# Patient Record
Sex: FEMALE | Race: BLACK OR AFRICAN AMERICAN | Employment: FULL TIME | URBAN - METROPOLITAN AREA
[De-identification: names, ages, dates, MRNs, and addresses within clinical notes are randomized per-mention and may not be internally consistent; named-entity substitution may affect disease eponyms.]

---

## 2017-01-06 ENCOUNTER — HOSPITAL ENCOUNTER (EMERGENCY)
Age: 22
Discharge: HOME | End: 2017-01-06
Payer: COMMERCIAL

## 2017-01-06 VITALS
HEART RATE: 55 BPM | SYSTOLIC BLOOD PRESSURE: 138 MMHG | RESPIRATION RATE: 16 BRPM | BODY MASS INDEX: 31.07 KG/M2 | OXYGEN SATURATION: 98 % | DIASTOLIC BLOOD PRESSURE: 77 MMHG | WEIGHT: 205 LBS | HEIGHT: 68 IN | TEMPERATURE: 98.7 F

## 2017-01-06 DIAGNOSIS — O21.0 HYPEREMESIS GRAVIDARUM: Primary | ICD-10-CM

## 2017-01-06 LAB
ALBUMIN SERPL-MCNC: 4.8 G/DL (ref 3.9–4.9)
ALP BLD-CCNC: 86 U/L (ref 40–130)
ALT SERPL-CCNC: 9 U/L (ref 0–33)
AMORPHOUS: ABNORMAL
ANION GAP SERPL CALCULATED.3IONS-SCNC: 17 MEQ/L (ref 7–13)
AST SERPL-CCNC: 8 U/L (ref 0–35)
BACTERIA: ABNORMAL /HPF
BASOPHILS ABSOLUTE: 0.1 K/UL (ref 0–0.2)
BASOPHILS RELATIVE PERCENT: 0.5 %
BETA-HYDROXYBUTYRATE: 11.3 MG/DL (ref 0.2–2.8)
BILIRUB SERPL-MCNC: 0.7 MG/DL (ref 0–1.2)
BILIRUBIN URINE: NEGATIVE
BLOOD, URINE: NEGATIVE
BUN BLDV-MCNC: 9 MG/DL (ref 6–20)
CALCIUM SERPL-MCNC: 9.9 MG/DL (ref 8.6–10.2)
CHLORIDE BLD-SCNC: 97 MEQ/L (ref 98–107)
CHP ED QC CHECK: YES
CLARITY: CLEAR
CO2: 19 MEQ/L (ref 22–29)
COLOR: ABNORMAL
CREAT SERPL-MCNC: 0.43 MG/DL (ref 0.5–0.9)
EOSINOPHILS ABSOLUTE: 0 K/UL (ref 0–0.7)
EOSINOPHILS RELATIVE PERCENT: 0.2 %
GFR AFRICAN AMERICAN: >60
GFR NON-AFRICAN AMERICAN: >60
GLOBULIN: 2.7 G/DL (ref 2.3–3.5)
GLUCOSE BLD-MCNC: 62 MG/DL
GLUCOSE BLD-MCNC: 62 MG/DL (ref 60–115)
GLUCOSE BLD-MCNC: 80 MG/DL (ref 74–109)
GLUCOSE URINE: NEGATIVE MG/DL
HCG(URINE) PREGNANCY TEST: POSITIVE
HCT VFR BLD CALC: 38.2 % (ref 37–47)
HEMOGLOBIN: 12.2 G/DL (ref 12–16)
KETONES, URINE: >=80 MG/DL
LEUKOCYTE ESTERASE, URINE: ABNORMAL
LYMPHOCYTES ABSOLUTE: 1.5 K/UL (ref 1–4.8)
LYMPHOCYTES RELATIVE PERCENT: 10 %
MCH RBC QN AUTO: 27.9 PG (ref 27–31.3)
MCHC RBC AUTO-ENTMCNC: 32 % (ref 33–37)
MCV RBC AUTO: 87.3 FL (ref 82–100)
MONOCYTES ABSOLUTE: 1 K/UL (ref 0.2–0.8)
MONOCYTES RELATIVE PERCENT: 6.3 %
MUCUS: PRESENT
NEUTROPHILS ABSOLUTE: 12.8 K/UL (ref 1.4–6.5)
NEUTROPHILS RELATIVE PERCENT: 83 %
NITRITE, URINE: NEGATIVE
PDW BLD-RTO: 12.7 % (ref 11.5–14.5)
PERFORMED ON: NORMAL
PH UA: 6 (ref 5–9)
PLATELET # BLD: 367 K/UL (ref 130–400)
POTASSIUM SERPL-SCNC: 4.1 MEQ/L (ref 3.5–5.1)
PROTEIN UA: 30 MG/DL
RBC # BLD: 4.38 M/UL (ref 4.2–5.4)
RBC UA: ABNORMAL /HPF (ref 0–2)
SODIUM BLD-SCNC: 133 MEQ/L (ref 132–144)
SPECIFIC GRAVITY UA: 1.03 (ref 1–1.03)
TOTAL PROTEIN: 7.5 G/DL (ref 6.4–8.1)
UROBILINOGEN, URINE: 1 E.U./DL
WBC # BLD: 15.4 K/UL (ref 4.5–11)
WBC UA: ABNORMAL /HPF (ref 0–5)

## 2017-01-06 PROCEDURE — 81001 URINALYSIS AUTO W/SCOPE: CPT

## 2017-01-06 PROCEDURE — 82010 KETONE BODYS QUAN: CPT

## 2017-01-06 PROCEDURE — 6360000002 HC RX W HCPCS: Performed by: PERSONAL EMERGENCY RESPONSE ATTENDANT

## 2017-01-06 PROCEDURE — 96374 THER/PROPH/DIAG INJ IV PUSH: CPT

## 2017-01-06 PROCEDURE — 36415 COLL VENOUS BLD VENIPUNCTURE: CPT

## 2017-01-06 PROCEDURE — 2580000003 HC RX 258: Performed by: PERSONAL EMERGENCY RESPONSE ATTENDANT

## 2017-01-06 PROCEDURE — 85025 COMPLETE CBC W/AUTO DIFF WBC: CPT

## 2017-01-06 PROCEDURE — 99283 EMERGENCY DEPT VISIT LOW MDM: CPT

## 2017-01-06 PROCEDURE — 84703 CHORIONIC GONADOTROPIN ASSAY: CPT

## 2017-01-06 PROCEDURE — 80053 COMPREHEN METABOLIC PANEL: CPT

## 2017-01-06 RX ORDER — 0.9 % SODIUM CHLORIDE 0.9 %
1000 INTRAVENOUS SOLUTION INTRAVENOUS ONCE
Status: COMPLETED | OUTPATIENT
Start: 2017-01-06 | End: 2017-01-06

## 2017-01-06 RX ORDER — RANITIDINE 150 MG/1
150 TABLET ORAL 2 TIMES DAILY
Status: ON HOLD | COMMUNITY
End: 2017-07-12

## 2017-01-06 RX ORDER — METOCLOPRAMIDE HYDROCHLORIDE 5 MG/ML
10 INJECTION INTRAMUSCULAR; INTRAVENOUS ONCE
Status: COMPLETED | OUTPATIENT
Start: 2017-01-06 | End: 2017-01-06

## 2017-01-06 RX ORDER — METOCLOPRAMIDE 10 MG/1
10 TABLET ORAL 4 TIMES DAILY
Status: ON HOLD | COMMUNITY
End: 2017-07-12

## 2017-01-06 RX ORDER — FERROUS SULFATE 325(65) MG
325 TABLET ORAL
Status: ON HOLD | COMMUNITY
End: 2017-07-12

## 2017-01-06 RX ORDER — DIPHENHYDRAMINE HYDROCHLORIDE 25 MG/1
25 CAPSULE ORAL 3 TIMES DAILY PRN
Status: ON HOLD | COMMUNITY
End: 2017-07-12

## 2017-01-06 RX ADMIN — METOCLOPRAMIDE 10 MG: 5 INJECTION, SOLUTION INTRAMUSCULAR; INTRAVENOUS at 20:22

## 2017-01-06 RX ADMIN — SODIUM CHLORIDE 1000 ML: 9 INJECTION, SOLUTION INTRAVENOUS at 20:22

## 2017-01-06 ASSESSMENT — ENCOUNTER SYMPTOMS
SHORTNESS OF BREATH: 0
COLOR CHANGE: 0
BLOOD IN STOOL: 0
ABDOMINAL PAIN: 1
SORE THROAT: 0
NAUSEA: 0
DIARRHEA: 0
COUGH: 0
TROUBLE SWALLOWING: 0
ANAL BLEEDING: 0
FACIAL SWELLING: 0
VOICE CHANGE: 0
VOMITING: 1

## 2017-01-06 ASSESSMENT — PAIN DESCRIPTION - LOCATION: LOCATION: ABDOMEN

## 2017-01-06 ASSESSMENT — PAIN SCALES - GENERAL: PAINLEVEL_OUTOF10: 6

## 2017-01-20 ENCOUNTER — HOSPITAL ENCOUNTER (OUTPATIENT)
Age: 22
Setting detail: OBSERVATION
Discharge: HOME | End: 2017-01-22
Attending: OBSTETRICS & GYNECOLOGY | Admitting: OBSTETRICS & GYNECOLOGY
Payer: COMMERCIAL

## 2017-01-20 ENCOUNTER — APPOINTMENT (OUTPATIENT)
Dept: ULTRASOUND IMAGING | Age: 22
End: 2017-01-20
Payer: COMMERCIAL

## 2017-01-20 ENCOUNTER — HOSPITAL ENCOUNTER (EMERGENCY)
Age: 22
Discharge: LEFT BEFORE DISCHARGE | End: 2017-01-20
Attending: EMERGENCY MEDICINE
Payer: COMMERCIAL

## 2017-01-20 VITALS
HEART RATE: 86 BPM | OXYGEN SATURATION: 99 % | TEMPERATURE: 97.9 F | HEIGHT: 68 IN | WEIGHT: 205 LBS | SYSTOLIC BLOOD PRESSURE: 136 MMHG | BODY MASS INDEX: 31.07 KG/M2 | DIASTOLIC BLOOD PRESSURE: 72 MMHG | RESPIRATION RATE: 17 BRPM

## 2017-01-20 DIAGNOSIS — R11.0 NAUSEA: Primary | ICD-10-CM

## 2017-01-20 DIAGNOSIS — O21.0 MILD HYPEREMESIS GRAVIDARUM, ANTEPARTUM: ICD-10-CM

## 2017-01-20 LAB
ABO/RH: NORMAL
ALBUMIN SERPL-MCNC: 4.3 G/DL (ref 3.9–4.9)
ALBUMIN SERPL-MCNC: 4.5 G/DL (ref 3.9–4.9)
ALP BLD-CCNC: 77 U/L (ref 40–130)
ALP BLD-CCNC: 87 U/L (ref 40–130)
ALT SERPL-CCNC: 49 U/L (ref 0–33)
ALT SERPL-CCNC: 53 U/L (ref 0–33)
ANION GAP SERPL CALCULATED.3IONS-SCNC: 13 MEQ/L (ref 7–13)
ANION GAP SERPL CALCULATED.3IONS-SCNC: 15 MEQ/L (ref 7–13)
ANTIBODY SCREEN: NORMAL
AST SERPL-CCNC: 15 U/L (ref 0–35)
AST SERPL-CCNC: 17 U/L (ref 0–35)
BASOPHILS ABSOLUTE: 0 K/UL (ref 0–0.2)
BASOPHILS ABSOLUTE: 0.1 K/UL (ref 0–0.2)
BASOPHILS RELATIVE PERCENT: 0.1 %
BASOPHILS RELATIVE PERCENT: 0.4 %
BETA-HYDROXYBUTYRATE: 2.4 MG/DL (ref 0.2–2.8)
BILIRUB SERPL-MCNC: 0.5 MG/DL (ref 0–1.2)
BILIRUB SERPL-MCNC: 0.6 MG/DL (ref 0–1.2)
BUN BLDV-MCNC: 6 MG/DL (ref 6–20)
BUN BLDV-MCNC: 7 MG/DL (ref 6–20)
CALCIUM SERPL-MCNC: 9.4 MG/DL (ref 8.6–10.2)
CALCIUM SERPL-MCNC: 9.8 MG/DL (ref 8.6–10.2)
CHLORIDE BLD-SCNC: 96 MEQ/L (ref 98–107)
CHLORIDE BLD-SCNC: 99 MEQ/L (ref 98–107)
CO2: 23 MEQ/L (ref 22–29)
CO2: 23 MEQ/L (ref 22–29)
CREAT SERPL-MCNC: 0.56 MG/DL (ref 0.5–0.9)
CREAT SERPL-MCNC: 0.61 MG/DL (ref 0.5–0.9)
EOSINOPHILS ABSOLUTE: 0.1 K/UL (ref 0–0.7)
EOSINOPHILS ABSOLUTE: 0.1 K/UL (ref 0–0.7)
EOSINOPHILS RELATIVE PERCENT: 0.5 %
EOSINOPHILS RELATIVE PERCENT: 0.5 %
GFR AFRICAN AMERICAN: >60
GFR AFRICAN AMERICAN: >60
GFR NON-AFRICAN AMERICAN: >60
GFR NON-AFRICAN AMERICAN: >60
GLOBULIN: 2.2 G/DL (ref 2.3–3.5)
GLOBULIN: 2.8 G/DL (ref 2.3–3.5)
GLUCOSE BLD-MCNC: 64 MG/DL (ref 60–115)
GLUCOSE BLD-MCNC: 75 MG/DL (ref 74–109)
GLUCOSE BLD-MCNC: 78 MG/DL (ref 60–115)
GLUCOSE BLD-MCNC: 78 MG/DL (ref 74–109)
HBA1C MFR BLD: 5.3 % (ref 4.8–5.9)
HCT VFR BLD CALC: 35 % (ref 37–47)
HCT VFR BLD CALC: 37.9 % (ref 37–47)
HEMOGLOBIN: 11.6 G/DL (ref 12–16)
HEMOGLOBIN: 12.8 G/DL (ref 12–16)
HEPATITIS B SURFACE ANTIGEN INTERPRETATION: NORMAL
LYMPHOCYTES ABSOLUTE: 1 K/UL (ref 1–4.8)
LYMPHOCYTES ABSOLUTE: 1.6 K/UL (ref 1–4.8)
LYMPHOCYTES RELATIVE PERCENT: 11.4 %
LYMPHOCYTES RELATIVE PERCENT: 8.6 %
MCH RBC QN AUTO: 28.9 PG (ref 27–31.3)
MCH RBC QN AUTO: 29.7 PG (ref 27–31.3)
MCHC RBC AUTO-ENTMCNC: 33.1 % (ref 33–37)
MCHC RBC AUTO-ENTMCNC: 33.9 % (ref 33–37)
MCV RBC AUTO: 87.3 FL (ref 82–100)
MCV RBC AUTO: 87.7 FL (ref 82–100)
MONOCYTES ABSOLUTE: 0.8 K/UL (ref 0.2–0.8)
MONOCYTES ABSOLUTE: 0.9 K/UL (ref 0.2–0.8)
MONOCYTES RELATIVE PERCENT: 6.7 %
MONOCYTES RELATIVE PERCENT: 6.9 %
NEUTROPHILS ABSOLUTE: 10 K/UL (ref 1.4–6.5)
NEUTROPHILS ABSOLUTE: 11 K/UL (ref 1.4–6.5)
NEUTROPHILS RELATIVE PERCENT: 80.8 %
NEUTROPHILS RELATIVE PERCENT: 84.1 %
PDW BLD-RTO: 12.9 % (ref 11.5–14.5)
PDW BLD-RTO: 13.2 % (ref 11.5–14.5)
PERFORMED ON: NORMAL
PERFORMED ON: NORMAL
PLATELET # BLD: 280 K/UL (ref 130–400)
PLATELET # BLD: 298 K/UL (ref 130–400)
POTASSIUM SERPL-SCNC: 3.4 MEQ/L (ref 3.5–5.1)
POTASSIUM SERPL-SCNC: 4 MEQ/L (ref 3.5–5.1)
RBC # BLD: 4.01 M/UL (ref 4.2–5.4)
RBC # BLD: 4.32 M/UL (ref 4.2–5.4)
SODIUM BLD-SCNC: 132 MEQ/L (ref 132–144)
SODIUM BLD-SCNC: 137 MEQ/L (ref 132–144)
TOTAL PROTEIN: 6.5 G/DL (ref 6.4–8.1)
TOTAL PROTEIN: 7.3 G/DL (ref 6.4–8.1)
WBC # BLD: 11.9 K/UL (ref 4.5–11)
WBC # BLD: 13.7 K/UL (ref 4.5–11)

## 2017-01-20 PROCEDURE — 83036 HEMOGLOBIN GLYCOSYLATED A1C: CPT

## 2017-01-20 PROCEDURE — 82010 KETONE BODYS QUAN: CPT

## 2017-01-20 PROCEDURE — 80053 COMPREHEN METABOLIC PANEL: CPT

## 2017-01-20 PROCEDURE — 6360000002 HC RX W HCPCS: Performed by: EMERGENCY MEDICINE

## 2017-01-20 PROCEDURE — 76817 TRANSVAGINAL US OBSTETRIC: CPT

## 2017-01-20 PROCEDURE — 99283 EMERGENCY DEPT VISIT LOW MDM: CPT

## 2017-01-20 PROCEDURE — 36415 COLL VENOUS BLD VENIPUNCTURE: CPT

## 2017-01-20 PROCEDURE — 99221 1ST HOSP IP/OBS SF/LOW 40: CPT | Performed by: OBSTETRICS & GYNECOLOGY

## 2017-01-20 PROCEDURE — 86592 SYPHILIS TEST NON-TREP QUAL: CPT

## 2017-01-20 PROCEDURE — 96361 HYDRATE IV INFUSION ADD-ON: CPT

## 2017-01-20 PROCEDURE — 96374 THER/PROPH/DIAG INJ IV PUSH: CPT

## 2017-01-20 PROCEDURE — 85025 COMPLETE CBC W/AUTO DIFF WBC: CPT

## 2017-01-20 PROCEDURE — 76801 OB US < 14 WKS SINGLE FETUS: CPT

## 2017-01-20 PROCEDURE — 6370000000 HC RX 637 (ALT 250 FOR IP): Performed by: OBSTETRICS & GYNECOLOGY

## 2017-01-20 PROCEDURE — 86900 BLOOD TYPING SEROLOGIC ABO: CPT

## 2017-01-20 PROCEDURE — 2580000003 HC RX 258: Performed by: OBSTETRICS & GYNECOLOGY

## 2017-01-20 PROCEDURE — 6360000002 HC RX W HCPCS: Performed by: OBSTETRICS & GYNECOLOGY

## 2017-01-20 PROCEDURE — G0378 HOSPITAL OBSERVATION PER HR: HCPCS

## 2017-01-20 PROCEDURE — 86850 RBC ANTIBODY SCREEN: CPT

## 2017-01-20 PROCEDURE — 86901 BLOOD TYPING SEROLOGIC RH(D): CPT

## 2017-01-20 PROCEDURE — 87340 HEPATITIS B SURFACE AG IA: CPT

## 2017-01-20 PROCEDURE — 84600 ASSAY OF VOLATILES: CPT

## 2017-01-20 RX ORDER — ONDANSETRON 2 MG/ML
4 INJECTION INTRAMUSCULAR; INTRAVENOUS EVERY 6 HOURS PRN
Status: DISCONTINUED | OUTPATIENT
Start: 2017-01-20 | End: 2017-01-22 | Stop reason: HOSPADM

## 2017-01-20 RX ORDER — ZOLPIDEM TARTRATE 5 MG/1
5 TABLET ORAL NIGHTLY PRN
Status: DISCONTINUED | OUTPATIENT
Start: 2017-01-20 | End: 2017-01-22 | Stop reason: HOSPADM

## 2017-01-20 RX ORDER — ACETAMINOPHEN 500 MG
1000 TABLET ORAL EVERY 6 HOURS PRN
Status: DISCONTINUED | OUTPATIENT
Start: 2017-01-20 | End: 2017-01-22 | Stop reason: HOSPADM

## 2017-01-20 RX ORDER — DEXTROSE MONOHYDRATE 50 MG/ML
100 INJECTION, SOLUTION INTRAVENOUS PRN
Status: DISCONTINUED | OUTPATIENT
Start: 2017-01-20 | End: 2017-01-22 | Stop reason: HOSPADM

## 2017-01-20 RX ORDER — HYDROXYZINE HYDROCHLORIDE 25 MG/1
25 TABLET, FILM COATED ORAL 3 TIMES DAILY PRN
Status: ON HOLD | COMMUNITY
End: 2017-07-12

## 2017-01-20 RX ORDER — SODIUM CHLORIDE, SODIUM LACTATE, POTASSIUM CHLORIDE, AND CALCIUM CHLORIDE .6; .31; .03; .02 G/100ML; G/100ML; G/100ML; G/100ML
1000 INJECTION, SOLUTION INTRAVENOUS ONCE
Status: COMPLETED | OUTPATIENT
Start: 2017-01-20 | End: 2017-01-20

## 2017-01-20 RX ORDER — DEXTROSE MONOHYDRATE 25 G/50ML
12.5 INJECTION, SOLUTION INTRAVENOUS PRN
Status: DISCONTINUED | OUTPATIENT
Start: 2017-01-20 | End: 2017-01-22 | Stop reason: HOSPADM

## 2017-01-20 RX ORDER — FAMOTIDINE 20 MG/1
20 TABLET, FILM COATED ORAL 2 TIMES DAILY
Status: DISCONTINUED | OUTPATIENT
Start: 2017-01-20 | End: 2017-01-22 | Stop reason: HOSPADM

## 2017-01-20 RX ORDER — PROMETHAZINE HYDROCHLORIDE 25 MG/ML
6.25 INJECTION, SOLUTION INTRAMUSCULAR; INTRAVENOUS EVERY 6 HOURS PRN
Status: DISCONTINUED | OUTPATIENT
Start: 2017-01-20 | End: 2017-01-22 | Stop reason: HOSPADM

## 2017-01-20 RX ORDER — ONDANSETRON 2 MG/ML
4 INJECTION INTRAMUSCULAR; INTRAVENOUS ONCE
Status: DISCONTINUED | OUTPATIENT
Start: 2017-01-20 | End: 2017-01-20

## 2017-01-20 RX ORDER — ONDANSETRON 4 MG/1
4 TABLET, ORALLY DISINTEGRATING ORAL ONCE
Status: COMPLETED | OUTPATIENT
Start: 2017-01-20 | End: 2017-01-20

## 2017-01-20 RX ORDER — DEXTROSE AND SODIUM CHLORIDE 5; .9 G/100ML; G/100ML
INJECTION, SOLUTION INTRAVENOUS ONCE
Status: DISCONTINUED | OUTPATIENT
Start: 2017-01-20 | End: 2017-01-20 | Stop reason: HOSPADM

## 2017-01-20 RX ORDER — NICOTINE POLACRILEX 4 MG
15 LOZENGE BUCCAL PRN
Status: DISCONTINUED | OUTPATIENT
Start: 2017-01-20 | End: 2017-01-22 | Stop reason: HOSPADM

## 2017-01-20 RX ORDER — 0.9 % SODIUM CHLORIDE 0.9 %
1000 INTRAVENOUS SOLUTION INTRAVENOUS ONCE
Status: DISCONTINUED | OUTPATIENT
Start: 2017-01-20 | End: 2017-01-20 | Stop reason: HOSPADM

## 2017-01-20 RX ORDER — SODIUM CHLORIDE, SODIUM LACTATE, POTASSIUM CHLORIDE, CALCIUM CHLORIDE 600; 310; 30; 20 MG/100ML; MG/100ML; MG/100ML; MG/100ML
INJECTION, SOLUTION INTRAVENOUS CONTINUOUS
Status: DISCONTINUED | OUTPATIENT
Start: 2017-01-20 | End: 2017-01-22 | Stop reason: HOSPADM

## 2017-01-20 RX ADMIN — SODIUM CHLORIDE, POTASSIUM CHLORIDE, SODIUM LACTATE AND CALCIUM CHLORIDE: 600; 310; 30; 20 INJECTION, SOLUTION INTRAVENOUS at 20:21

## 2017-01-20 RX ADMIN — FAMOTIDINE 20 MG: 20 TABLET ORAL at 22:14

## 2017-01-20 RX ADMIN — SODIUM CHLORIDE, POTASSIUM CHLORIDE, SODIUM LACTATE AND CALCIUM CHLORIDE 1000 ML: 600; 310; 30; 20 INJECTION, SOLUTION INTRAVENOUS at 19:01

## 2017-01-20 RX ADMIN — ONDANSETRON 4 MG: 2 INJECTION INTRAMUSCULAR; INTRAVENOUS at 19:02

## 2017-01-20 RX ADMIN — ONDANSETRON 4 MG: 4 TABLET, ORALLY DISINTEGRATING ORAL at 14:29

## 2017-01-20 ASSESSMENT — ENCOUNTER SYMPTOMS
ABDOMINAL PAIN: 0
CHOKING: 0
SORE THROAT: 0
BLOOD IN STOOL: 0
EYE REDNESS: 0
CONSTIPATION: 0
EYE PAIN: 0
VOMITING: 1
COUGH: 0
BACK PAIN: 0
STRIDOR: 0
EYE DISCHARGE: 0
TROUBLE SWALLOWING: 0
SHORTNESS OF BREATH: 0
FACIAL SWELLING: 0
CHEST TIGHTNESS: 0
SINUS PRESSURE: 0
DIARRHEA: 0
WHEEZING: 0
NAUSEA: 1
VOICE CHANGE: 0

## 2017-01-21 LAB
AMPHETAMINE SCREEN, URINE: NORMAL
BARBITURATE SCREEN URINE: NORMAL
BENZODIAZEPINE SCREEN, URINE: NORMAL
BILIRUBIN URINE: NEGATIVE
BLOOD, URINE: NEGATIVE
CANNABINOID SCREEN URINE: NORMAL
CLARITY: CLEAR
COCAINE METABOLITE SCREEN URINE: NORMAL
COLOR: YELLOW
GLUCOSE BLD-MCNC: 75 MG/DL (ref 60–115)
GLUCOSE BLD-MCNC: 76 MG/DL (ref 60–115)
GLUCOSE BLD-MCNC: 81 MG/DL (ref 60–115)
GLUCOSE URINE: NEGATIVE MG/DL
KETONES, URINE: 40 MG/DL
LEUKOCYTE ESTERASE, URINE: ABNORMAL
Lab: NORMAL
NITRITE, URINE: NEGATIVE
OPIATE SCREEN URINE: NORMAL
PERFORMED ON: NORMAL
PH UA: 7 (ref 5–9)
PHENCYCLIDINE SCREEN URINE: NORMAL
PROTEIN UA: NEGATIVE MG/DL
RBC UA: NORMAL /HPF (ref 0–2)
SPECIFIC GRAVITY UA: 1 (ref 1–1.03)
UROBILINOGEN, URINE: 1 E.U./DL
WBC UA: NORMAL /HPF (ref 0–5)

## 2017-01-21 PROCEDURE — 81001 URINALYSIS AUTO W/SCOPE: CPT

## 2017-01-21 PROCEDURE — 6360000002 HC RX W HCPCS: Performed by: OBSTETRICS & GYNECOLOGY

## 2017-01-21 PROCEDURE — G0378 HOSPITAL OBSERVATION PER HR: HCPCS

## 2017-01-21 PROCEDURE — 96376 TX/PRO/DX INJ SAME DRUG ADON: CPT

## 2017-01-21 PROCEDURE — 99231 SBSQ HOSP IP/OBS SF/LOW 25: CPT | Performed by: OBSTETRICS & GYNECOLOGY

## 2017-01-21 PROCEDURE — 6370000000 HC RX 637 (ALT 250 FOR IP): Performed by: OBSTETRICS & GYNECOLOGY

## 2017-01-21 PROCEDURE — 80307 DRUG TEST PRSMV CHEM ANLYZR: CPT

## 2017-01-21 PROCEDURE — 2580000003 HC RX 258: Performed by: OBSTETRICS & GYNECOLOGY

## 2017-01-21 RX ADMIN — SODIUM CHLORIDE, POTASSIUM CHLORIDE, SODIUM LACTATE AND CALCIUM CHLORIDE: 600; 310; 30; 20 INJECTION, SOLUTION INTRAVENOUS at 11:40

## 2017-01-21 RX ADMIN — ONDANSETRON 4 MG: 2 INJECTION INTRAMUSCULAR; INTRAVENOUS at 08:04

## 2017-01-21 RX ADMIN — FAMOTIDINE 20 MG: 20 TABLET ORAL at 09:59

## 2017-01-21 RX ADMIN — SODIUM CHLORIDE, POTASSIUM CHLORIDE, SODIUM LACTATE AND CALCIUM CHLORIDE: 600; 310; 30; 20 INJECTION, SOLUTION INTRAVENOUS at 04:23

## 2017-01-21 RX ADMIN — FAMOTIDINE 20 MG: 20 TABLET ORAL at 20:43

## 2017-01-21 RX ADMIN — SODIUM CHLORIDE, POTASSIUM CHLORIDE, SODIUM LACTATE AND CALCIUM CHLORIDE 1000 ML: 600; 310; 30; 20 INJECTION, SOLUTION INTRAVENOUS at 19:26

## 2017-01-21 ASSESSMENT — PAIN SCALES - GENERAL
PAINLEVEL_OUTOF10: 8
PAINLEVEL_OUTOF10: 0

## 2017-01-21 ASSESSMENT — PAIN DESCRIPTION - LOCATION: LOCATION: HEAD

## 2017-01-21 ASSESSMENT — PAIN DESCRIPTION - PAIN TYPE: TYPE: ACUTE PAIN

## 2017-01-22 VITALS
TEMPERATURE: 98.5 F | HEART RATE: 62 BPM | RESPIRATION RATE: 16 BRPM | SYSTOLIC BLOOD PRESSURE: 105 MMHG | DIASTOLIC BLOOD PRESSURE: 53 MMHG

## 2017-01-22 LAB
ACETONE SERUM QUANT: <5 MG/DL
GLUCOSE BLD-MCNC: 80 MG/DL (ref 60–115)
GLUCOSE BLD-MCNC: 81 MG/DL (ref 60–115)
ISOPROPANOL BLOOD: <5 MG/DL
PERFORMED ON: NORMAL
PERFORMED ON: NORMAL
RPR: NORMAL

## 2017-01-22 PROCEDURE — 99238 HOSP IP/OBS DSCHRG MGMT 30/<: CPT | Performed by: OBSTETRICS & GYNECOLOGY

## 2017-01-22 PROCEDURE — 2580000003 HC RX 258: Performed by: OBSTETRICS & GYNECOLOGY

## 2017-01-22 PROCEDURE — G0378 HOSPITAL OBSERVATION PER HR: HCPCS

## 2017-01-22 PROCEDURE — 6370000000 HC RX 637 (ALT 250 FOR IP): Performed by: OBSTETRICS & GYNECOLOGY

## 2017-01-22 RX ORDER — ONDANSETRON 4 MG/1
4 TABLET, FILM COATED ORAL EVERY 8 HOURS PRN
Qty: 30 TABLET | Refills: 2 | Status: ON HOLD | OUTPATIENT
Start: 2017-01-22 | End: 2017-07-12

## 2017-01-22 RX ADMIN — SODIUM CHLORIDE, POTASSIUM CHLORIDE, SODIUM LACTATE AND CALCIUM CHLORIDE 1000 ML: 600; 310; 30; 20 INJECTION, SOLUTION INTRAVENOUS at 03:29

## 2017-01-22 RX ADMIN — FAMOTIDINE 20 MG: 20 TABLET ORAL at 09:39

## 2017-01-26 ENCOUNTER — HOSPITAL ENCOUNTER (OUTPATIENT)
Age: 22
Setting detail: OBSERVATION
Discharge: HOME | End: 2017-01-27
Attending: OBSTETRICS & GYNECOLOGY | Admitting: OBSTETRICS & GYNECOLOGY
Payer: COMMERCIAL

## 2017-01-26 PROCEDURE — 96374 THER/PROPH/DIAG INJ IV PUSH: CPT

## 2017-01-26 PROCEDURE — 99222 1ST HOSP IP/OBS MODERATE 55: CPT | Performed by: OBSTETRICS & GYNECOLOGY

## 2017-01-26 PROCEDURE — G0379 DIRECT REFER HOSPITAL OBSERV: HCPCS

## 2017-01-26 PROCEDURE — 6360000002 HC RX W HCPCS: Performed by: OBSTETRICS & GYNECOLOGY

## 2017-01-26 PROCEDURE — 96375 TX/PRO/DX INJ NEW DRUG ADDON: CPT

## 2017-01-26 PROCEDURE — 2580000003 HC RX 258: Performed by: OBSTETRICS & GYNECOLOGY

## 2017-01-26 PROCEDURE — G0378 HOSPITAL OBSERVATION PER HR: HCPCS

## 2017-01-26 RX ORDER — SODIUM CHLORIDE, SODIUM LACTATE, POTASSIUM CHLORIDE, AND CALCIUM CHLORIDE .6; .31; .03; .02 G/100ML; G/100ML; G/100ML; G/100ML
1000 INJECTION, SOLUTION INTRAVENOUS ONCE
Status: DISCONTINUED | OUTPATIENT
Start: 2017-01-26 | End: 2017-01-27 | Stop reason: HOSPADM

## 2017-01-26 RX ORDER — METOCLOPRAMIDE HYDROCHLORIDE 5 MG/ML
10 INJECTION INTRAMUSCULAR; INTRAVENOUS EVERY 6 HOURS
Status: DISCONTINUED | OUTPATIENT
Start: 2017-01-26 | End: 2017-01-27 | Stop reason: HOSPADM

## 2017-01-26 RX ORDER — SODIUM CHLORIDE, SODIUM LACTATE, POTASSIUM CHLORIDE, CALCIUM CHLORIDE 600; 310; 30; 20 MG/100ML; MG/100ML; MG/100ML; MG/100ML
INJECTION, SOLUTION INTRAVENOUS CONTINUOUS
Status: DISCONTINUED | OUTPATIENT
Start: 2017-01-26 | End: 2017-01-27 | Stop reason: HOSPADM

## 2017-01-26 RX ORDER — ONDANSETRON 2 MG/ML
4 INJECTION INTRAMUSCULAR; INTRAVENOUS EVERY 6 HOURS PRN
Status: DISCONTINUED | OUTPATIENT
Start: 2017-01-26 | End: 2017-01-27 | Stop reason: HOSPADM

## 2017-01-26 RX ORDER — ACETAMINOPHEN 325 MG/1
650 TABLET ORAL EVERY 4 HOURS PRN
Status: DISCONTINUED | OUTPATIENT
Start: 2017-01-26 | End: 2017-01-27 | Stop reason: HOSPADM

## 2017-01-26 RX ADMIN — METOCLOPRAMIDE 10 MG: 5 INJECTION, SOLUTION INTRAMUSCULAR; INTRAVENOUS at 21:55

## 2017-01-26 RX ADMIN — SODIUM CHLORIDE, POTASSIUM CHLORIDE, SODIUM LACTATE AND CALCIUM CHLORIDE: 600; 310; 30; 20 INJECTION, SOLUTION INTRAVENOUS at 21:55

## 2017-01-26 RX ADMIN — SODIUM CHLORIDE, POTASSIUM CHLORIDE, SODIUM LACTATE AND CALCIUM CHLORIDE 125 ML/HR: 600; 310; 30; 20 INJECTION, SOLUTION INTRAVENOUS at 20:34

## 2017-01-26 RX ADMIN — ONDANSETRON 4 MG: 2 INJECTION INTRAMUSCULAR; INTRAVENOUS at 20:35

## 2017-01-27 VITALS
HEIGHT: 68 IN | DIASTOLIC BLOOD PRESSURE: 69 MMHG | RESPIRATION RATE: 18 BRPM | SYSTOLIC BLOOD PRESSURE: 129 MMHG | WEIGHT: 208 LBS | TEMPERATURE: 98.4 F | HEART RATE: 81 BPM | BODY MASS INDEX: 31.52 KG/M2

## 2017-01-27 LAB
ALBUMIN SERPL-MCNC: 3.7 G/DL (ref 3.9–4.9)
ALP BLD-CCNC: 56 U/L (ref 40–130)
ALT SERPL-CCNC: 25 U/L (ref 0–33)
ANION GAP SERPL CALCULATED.3IONS-SCNC: 16 MEQ/L (ref 7–13)
AST SERPL-CCNC: 8 U/L (ref 0–35)
BACTERIA: NORMAL /HPF
BASOPHILS ABSOLUTE: 0 K/UL (ref 0–0.2)
BASOPHILS RELATIVE PERCENT: 0.3 %
BILIRUB SERPL-MCNC: 0.6 MG/DL (ref 0–1.2)
BILIRUBIN URINE: NEGATIVE
BLOOD, URINE: NEGATIVE
BUN BLDV-MCNC: 5 MG/DL (ref 6–20)
CALCIUM SERPL-MCNC: 8.9 MG/DL (ref 8.6–10.2)
CHLORIDE BLD-SCNC: 99 MEQ/L (ref 98–107)
CLARITY: ABNORMAL
CO2: 19 MEQ/L (ref 22–29)
COLOR: ABNORMAL
CREAT SERPL-MCNC: 0.42 MG/DL (ref 0.5–0.9)
EOSINOPHILS ABSOLUTE: 0.1 K/UL (ref 0–0.7)
EOSINOPHILS RELATIVE PERCENT: 0.6 %
EPITHELIAL CELLS, UA: NORMAL /HPF
GFR AFRICAN AMERICAN: >60
GFR NON-AFRICAN AMERICAN: >60
GLOBULIN: 1.7 G/DL (ref 2.3–3.5)
GLUCOSE BLD-MCNC: 126 MG/DL (ref 60–115)
GLUCOSE BLD-MCNC: 73 MG/DL (ref 74–109)
GLUCOSE URINE: NEGATIVE MG/DL
HCT VFR BLD CALC: 30.9 % (ref 37–47)
HEMOGLOBIN: 10.5 G/DL (ref 12–16)
HEPATITIS B SURFACE ANTIGEN INTERPRETATION: NORMAL
KETONES, URINE: >=80 MG/DL
LEUKOCYTE ESTERASE, URINE: ABNORMAL
LYMPHOCYTES ABSOLUTE: 1.7 K/UL (ref 1–4.8)
LYMPHOCYTES RELATIVE PERCENT: 16.1 %
MCH RBC QN AUTO: 29.4 PG (ref 27–31.3)
MCHC RBC AUTO-ENTMCNC: 33.9 % (ref 33–37)
MCV RBC AUTO: 86.8 FL (ref 82–100)
MONOCYTES ABSOLUTE: 0.7 K/UL (ref 0.2–0.8)
MONOCYTES RELATIVE PERCENT: 6.8 %
MUCUS: PRESENT
NEUTROPHILS ABSOLUTE: 7.8 K/UL (ref 1.4–6.5)
NEUTROPHILS RELATIVE PERCENT: 76.2 %
NITRITE, URINE: NEGATIVE
PDW BLD-RTO: 13.5 % (ref 11.5–14.5)
PERFORMED ON: ABNORMAL
PH UA: 6 (ref 5–9)
PLATELET # BLD: 239 K/UL (ref 130–400)
POTASSIUM SERPL-SCNC: 3.6 MEQ/L (ref 3.5–5.1)
PROTEIN UA: NEGATIVE MG/DL
RBC # BLD: 3.56 M/UL (ref 4.2–5.4)
RBC UA: NORMAL /HPF (ref 0–2)
SODIUM BLD-SCNC: 134 MEQ/L (ref 132–144)
SPECIFIC GRAVITY UA: 1.02 (ref 1–1.03)
TOTAL PROTEIN: 5.4 G/DL (ref 6.4–8.1)
UROBILINOGEN, URINE: 1 E.U./DL
WBC # BLD: 10.3 K/UL (ref 4.5–11)
WBC UA: NORMAL /HPF (ref 0–5)

## 2017-01-27 PROCEDURE — 87340 HEPATITIS B SURFACE AG IA: CPT

## 2017-01-27 PROCEDURE — 2580000003 HC RX 258: Performed by: OBSTETRICS & GYNECOLOGY

## 2017-01-27 PROCEDURE — 80053 COMPREHEN METABOLIC PANEL: CPT

## 2017-01-27 PROCEDURE — 81001 URINALYSIS AUTO W/SCOPE: CPT

## 2017-01-27 PROCEDURE — 85025 COMPLETE CBC W/AUTO DIFF WBC: CPT

## 2017-01-27 PROCEDURE — 96376 TX/PRO/DX INJ SAME DRUG ADON: CPT

## 2017-01-27 PROCEDURE — G0378 HOSPITAL OBSERVATION PER HR: HCPCS

## 2017-01-27 PROCEDURE — 36415 COLL VENOUS BLD VENIPUNCTURE: CPT

## 2017-01-27 PROCEDURE — 6360000002 HC RX W HCPCS: Performed by: OBSTETRICS & GYNECOLOGY

## 2017-01-27 RX ADMIN — METOCLOPRAMIDE 10 MG: 5 INJECTION, SOLUTION INTRAMUSCULAR; INTRAVENOUS at 07:54

## 2017-01-27 RX ADMIN — SODIUM CHLORIDE, POTASSIUM CHLORIDE, SODIUM LACTATE AND CALCIUM CHLORIDE: 600; 310; 30; 20 INJECTION, SOLUTION INTRAVENOUS at 07:54

## 2017-01-27 ASSESSMENT — PAIN SCALES - GENERAL: PAINLEVEL_OUTOF10: 0

## 2017-05-18 ENCOUNTER — HOSPITAL ENCOUNTER (OUTPATIENT)
Age: 22
Discharge: HOME | End: 2017-05-19
Attending: OBSTETRICS & GYNECOLOGY | Admitting: OBSTETRICS & GYNECOLOGY
Payer: COMMERCIAL

## 2017-05-18 LAB
BILIRUBIN URINE: NEGATIVE
BLOOD, URINE: NEGATIVE
CLARITY: CLEAR
COLOR: YELLOW
GLUCOSE URINE: NEGATIVE MG/DL
KETONES, URINE: ABNORMAL MG/DL
LEUKOCYTE ESTERASE, URINE: NEGATIVE
NITRITE, URINE: NEGATIVE
PH UA: 6.5 (ref 5–9)
PROTEIN UA: ABNORMAL MG/DL
SPECIFIC GRAVITY UA: 1.03 (ref 1–1.03)
UROBILINOGEN, URINE: 0.2 E.U./DL

## 2017-05-18 PROCEDURE — 81003 URINALYSIS AUTO W/O SCOPE: CPT

## 2017-05-19 VITALS
RESPIRATION RATE: 18 BRPM | HEART RATE: 97 BPM | SYSTOLIC BLOOD PRESSURE: 131 MMHG | TEMPERATURE: 98 F | DIASTOLIC BLOOD PRESSURE: 62 MMHG

## 2017-05-19 LAB
ALBUMIN SERPL-MCNC: 3.3 G/DL (ref 3.9–4.9)
ALP BLD-CCNC: 101 U/L (ref 40–130)
ALT SERPL-CCNC: 18 U/L (ref 0–33)
ANION GAP SERPL CALCULATED.3IONS-SCNC: 14 MEQ/L (ref 7–13)
APTT: 25.1 SEC (ref 21.6–35.4)
AST SERPL-CCNC: 10 U/L (ref 0–35)
BASOPHILS ABSOLUTE: 0.1 K/UL (ref 0–0.2)
BASOPHILS RELATIVE PERCENT: 0.4 %
BILIRUB SERPL-MCNC: 0.1 MG/DL (ref 0–1.2)
BUN BLDV-MCNC: 5 MG/DL (ref 6–20)
CALCIUM SERPL-MCNC: 8.5 MG/DL (ref 8.6–10.2)
CHLORIDE BLD-SCNC: 102 MEQ/L (ref 98–107)
CLUE CELLS: ABNORMAL
CO2: 18 MEQ/L (ref 22–29)
CREAT SERPL-MCNC: 0.47 MG/DL (ref 0.5–0.9)
EOSINOPHILS ABSOLUTE: 0.3 K/UL (ref 0–0.7)
EOSINOPHILS RELATIVE PERCENT: 1.7 %
GFR AFRICAN AMERICAN: >60
GFR NON-AFRICAN AMERICAN: >60
GLOBULIN: 2.4 G/DL (ref 2.3–3.5)
GLUCOSE BLD-MCNC: 102 MG/DL (ref 74–109)
HCT VFR BLD CALC: 31.1 % (ref 37–47)
HEMOGLOBIN: 10 G/DL (ref 12–16)
INR BLD: 0.9
LYMPHOCYTES ABSOLUTE: 1.5 K/UL (ref 1–4.8)
LYMPHOCYTES RELATIVE PERCENT: 8.9 %
MCH RBC QN AUTO: 29.7 PG (ref 27–31.3)
MCHC RBC AUTO-ENTMCNC: 32.2 % (ref 33–37)
MCV RBC AUTO: 92.3 FL (ref 82–100)
MONOCYTES ABSOLUTE: 1.3 K/UL (ref 0.2–0.8)
MONOCYTES RELATIVE PERCENT: 7.6 %
NEUTROPHILS ABSOLUTE: 13.7 K/UL (ref 1.4–6.5)
NEUTROPHILS RELATIVE PERCENT: 81.4 %
PDW BLD-RTO: 13.6 % (ref 11.5–14.5)
PLATELET # BLD: 284 K/UL (ref 130–400)
POTASSIUM SERPL-SCNC: 3.7 MEQ/L (ref 3.5–5.1)
PROTHROMBIN TIME: 9.4 SEC (ref 8.1–13.7)
RBC # BLD: 3.37 M/UL (ref 4.2–5.4)
SODIUM BLD-SCNC: 134 MEQ/L (ref 132–144)
TOTAL PROTEIN: 5.7 G/DL (ref 6.4–8.1)
TRICHOMONAS PREP: ABNORMAL
TRICHOMONAS VAGINALIS SCREEN: NEGATIVE
URIC ACID, SERUM: 3.3 MG/DL (ref 2.4–5.7)
WBC # BLD: 16.9 K/UL (ref 4.5–11)
YEAST WET PREP: ABNORMAL

## 2017-05-19 PROCEDURE — 85610 PROTHROMBIN TIME: CPT

## 2017-05-19 PROCEDURE — 99283 EMERGENCY DEPT VISIT LOW MDM: CPT

## 2017-05-19 PROCEDURE — 87491 CHLMYD TRACH DNA AMP PROBE: CPT

## 2017-05-19 PROCEDURE — 85025 COMPLETE CBC W/AUTO DIFF WBC: CPT

## 2017-05-19 PROCEDURE — 87591 N.GONORRHOEAE DNA AMP PROB: CPT

## 2017-05-19 PROCEDURE — 80053 COMPREHEN METABOLIC PANEL: CPT

## 2017-05-19 PROCEDURE — 99213 OFFICE O/P EST LOW 20 MIN: CPT | Performed by: OBSTETRICS & GYNECOLOGY

## 2017-05-19 PROCEDURE — 6370000000 HC RX 637 (ALT 250 FOR IP)

## 2017-05-19 PROCEDURE — 59025 FETAL NON-STRESS TEST: CPT

## 2017-05-19 PROCEDURE — 87660 TRICHOMONAS VAGIN DIR PROBE: CPT

## 2017-05-19 PROCEDURE — 85730 THROMBOPLASTIN TIME PARTIAL: CPT

## 2017-05-19 PROCEDURE — 84550 ASSAY OF BLOOD/URIC ACID: CPT

## 2017-05-19 PROCEDURE — 87210 SMEAR WET MOUNT SALINE/INK: CPT

## 2017-05-19 PROCEDURE — 87070 CULTURE OTHR SPECIMN AEROBIC: CPT

## 2017-05-19 RX ORDER — FLUCONAZOLE 150 MG/1
150 TABLET ORAL ONCE
Status: COMPLETED | OUTPATIENT
Start: 2017-05-19 | End: 2017-05-19

## 2017-05-19 RX ORDER — FLUCONAZOLE 150 MG/1
TABLET ORAL
Status: COMPLETED
Start: 2017-05-19 | End: 2017-05-19

## 2017-05-19 RX ADMIN — FLUCONAZOLE 150 MG: 150 TABLET ORAL at 02:44

## 2017-05-20 LAB
CHLAMYDIA TRACHOMATIS AMPLIFIED DET: NEGATIVE
N GONORRHOEAE AMPLIFIED DET: NEGATIVE
SPECIMEN SOURCE: NORMAL

## 2017-05-21 LAB
GENITAL CULTURE, ROUTINE: ABNORMAL
GENITAL CULTURE, ROUTINE: ABNORMAL
ORGANISM: ABNORMAL

## 2017-05-22 ENCOUNTER — OFFICE VISIT (OUTPATIENT)
Dept: OBGYN | Age: 22
End: 2017-05-22

## 2017-05-22 VITALS
BODY MASS INDEX: 38.49 KG/M2 | HEART RATE: 112 BPM | DIASTOLIC BLOOD PRESSURE: 72 MMHG | HEIGHT: 68 IN | WEIGHT: 254 LBS | SYSTOLIC BLOOD PRESSURE: 122 MMHG

## 2017-05-22 DIAGNOSIS — Z34.02 NORMAL FIRST PREGNANCY CONFIRMED, SECOND TRIMESTER: ICD-10-CM

## 2017-05-22 DIAGNOSIS — Z34.02 NORMAL FIRST PREGNANCY CONFIRMED, SECOND TRIMESTER: Primary | ICD-10-CM

## 2017-05-22 DIAGNOSIS — O21.0 HYPEREMESIS COMPLICATING PREGNANCY, ANTEPARTUM: ICD-10-CM

## 2017-05-22 LAB
BASOPHILS ABSOLUTE: 0.1 K/UL (ref 0–0.2)
BASOPHILS RELATIVE PERCENT: 0.6 %
BILIRUBIN, POC: NORMAL
BLOOD URINE, POC: NORMAL
CLARITY, POC: CLEAR
COLOR, POC: YELLOW
EOSINOPHILS ABSOLUTE: 0.2 K/UL (ref 0–0.7)
EOSINOPHILS RELATIVE PERCENT: 1.1 %
GLUCOSE URINE, POC: NORMAL
GLUCOSE, 1HR PP: 86 MG/DL (ref 60–140)
HCT VFR BLD CALC: 32.5 % (ref 37–47)
HEMOGLOBIN: 10.9 G/DL (ref 12–16)
KETONES, POC: NORMAL
LEUKOCYTE EST, POC: NORMAL
LYMPHOCYTES ABSOLUTE: 1.2 K/UL (ref 1–4.8)
LYMPHOCYTES RELATIVE PERCENT: 8 %
MCH RBC QN AUTO: 30.6 PG (ref 27–31.3)
MCHC RBC AUTO-ENTMCNC: 33.6 % (ref 33–37)
MCV RBC AUTO: 91 FL (ref 82–100)
MONOCYTES ABSOLUTE: 0.9 K/UL (ref 0.2–0.8)
MONOCYTES RELATIVE PERCENT: 6.1 %
NEUTROPHILS ABSOLUTE: 12.5 K/UL (ref 1.4–6.5)
NEUTROPHILS RELATIVE PERCENT: 84.2 %
NITRITE, POC: NORMAL
PDW BLD-RTO: 13.3 % (ref 11.5–14.5)
PH, POC: 6
PLATELET # BLD: 303 K/UL (ref 130–400)
PROTEIN, POC: NORMAL
RBC # BLD: 3.58 M/UL (ref 4.2–5.4)
SPECIFIC GRAVITY, POC: 1.03
UROBILINOGEN, POC: NORMAL
WBC # BLD: 14.8 K/UL (ref 4.5–11)

## 2017-05-22 PROCEDURE — 81003 URINALYSIS AUTO W/O SCOPE: CPT | Performed by: OBSTETRICS & GYNECOLOGY

## 2017-05-22 PROCEDURE — 0501F PRENATAL FLOW SHEET: CPT | Performed by: OBSTETRICS & GYNECOLOGY

## 2017-06-02 ENCOUNTER — HOSPITAL ENCOUNTER (OUTPATIENT)
Dept: ULTRASOUND IMAGING | Age: 22
Discharge: HOME | End: 2017-06-02
Payer: COMMERCIAL

## 2017-06-02 DIAGNOSIS — Z34.02 NORMAL FIRST PREGNANCY CONFIRMED, SECOND TRIMESTER: ICD-10-CM

## 2017-06-02 PROCEDURE — 76805 OB US >/= 14 WKS SNGL FETUS: CPT

## 2017-06-05 ENCOUNTER — ROUTINE PRENATAL (OUTPATIENT)
Dept: OBGYN | Age: 22
End: 2017-06-05

## 2017-06-05 VITALS
WEIGHT: 258 LBS | DIASTOLIC BLOOD PRESSURE: 60 MMHG | SYSTOLIC BLOOD PRESSURE: 114 MMHG | HEART RATE: 108 BPM | BODY MASS INDEX: 39.23 KG/M2

## 2017-06-05 DIAGNOSIS — Z34.03 SUPERVISION OF NORMAL FIRST PREGNANCY IN THIRD TRIMESTER: Primary | ICD-10-CM

## 2017-06-05 LAB
BILIRUBIN, POC: NORMAL
BLOOD URINE, POC: NORMAL
CLARITY, POC: CLEAR
COLOR, POC: YELLOW
GLUCOSE URINE, POC: NORMAL
KETONES, POC: NORMAL
LEUKOCYTE EST, POC: NORMAL
NITRITE, POC: NORMAL
PH, POC: 6
PROTEIN, POC: NORMAL
SPECIFIC GRAVITY, POC: 1.03
UROBILINOGEN, POC: NORMAL

## 2017-06-05 PROCEDURE — 81003 URINALYSIS AUTO W/O SCOPE: CPT | Performed by: OBSTETRICS & GYNECOLOGY

## 2017-06-05 PROCEDURE — 0502F SUBSEQUENT PRENATAL CARE: CPT | Performed by: OBSTETRICS & GYNECOLOGY

## 2017-07-05 ENCOUNTER — ROUTINE PRENATAL (OUTPATIENT)
Dept: OBGYN | Age: 22
End: 2017-07-05

## 2017-07-05 VITALS
WEIGHT: 262 LBS | HEART RATE: 80 BPM | SYSTOLIC BLOOD PRESSURE: 108 MMHG | BODY MASS INDEX: 39.84 KG/M2 | DIASTOLIC BLOOD PRESSURE: 74 MMHG

## 2017-07-05 DIAGNOSIS — Z34.03 SUPERVISION OF NORMAL FIRST PREGNANCY IN THIRD TRIMESTER: Primary | ICD-10-CM

## 2017-07-05 DIAGNOSIS — O21.0 HYPEREMESIS COMPLICATING PREGNANCY, ANTEPARTUM: ICD-10-CM

## 2017-07-05 LAB
AMORPHOUS: ABNORMAL
BACTERIA: ABNORMAL /HPF
BILIRUBIN URINE: NEGATIVE
BILIRUBIN, POC: ABNORMAL
BLOOD URINE, POC: ABNORMAL
BLOOD, URINE: ABNORMAL
CASTS 2: ABNORMAL /LPF
CASTS UA: ABNORMAL /LPF
CASTS: ABNORMAL /LPF
CLARITY, POC: CLEAR
CLARITY: ABNORMAL
COLOR, POC: YELLOW
COLOR: YELLOW
EPITHELIAL CELLS, UA: ABNORMAL /HPF
GLUCOSE URINE, POC: ABNORMAL
GLUCOSE URINE: NEGATIVE MG/DL
KETONES, POC: ABNORMAL
KETONES, URINE: NEGATIVE MG/DL
LEUKOCYTE EST, POC: ABNORMAL
LEUKOCYTE ESTERASE, URINE: ABNORMAL
MUCUS: PRESENT
NITRITE, POC: ABNORMAL
NITRITE, URINE: NEGATIVE
PH UA: 7 (ref 5–9)
PH, POC: 7
PROTEIN UA: NEGATIVE MG/DL
PROTEIN, POC: ABNORMAL
RBC UA: ABNORMAL /HPF (ref 0–2)
SPECIFIC GRAVITY UA: 1.02 (ref 1–1.03)
SPECIFIC GRAVITY, POC: 1.02
UROBILINOGEN, POC: ABNORMAL
UROBILINOGEN, URINE: 0.2 E.U./DL
WBC UA: ABNORMAL /HPF (ref 0–5)
YEAST: PRESENT

## 2017-07-05 PROCEDURE — 81003 URINALYSIS AUTO W/O SCOPE: CPT | Performed by: OBSTETRICS & GYNECOLOGY

## 2017-07-05 PROCEDURE — 0502F SUBSEQUENT PRENATAL CARE: CPT | Performed by: OBSTETRICS & GYNECOLOGY

## 2017-07-05 RX ORDER — FLUCONAZOLE 150 MG/1
TABLET ORAL
Qty: 3 TABLET | Refills: 0 | Status: ON HOLD | OUTPATIENT
Start: 2017-07-05 | End: 2017-07-12

## 2017-07-07 LAB — URINE CULTURE, ROUTINE: NORMAL

## 2017-07-12 ENCOUNTER — HOSPITAL ENCOUNTER (OUTPATIENT)
Age: 22
Discharge: HOME | End: 2017-07-12
Attending: OBSTETRICS & GYNECOLOGY | Admitting: OBSTETRICS & GYNECOLOGY
Payer: COMMERCIAL

## 2017-07-12 VITALS
HEART RATE: 81 BPM | RESPIRATION RATE: 16 BRPM | HEIGHT: 69 IN | SYSTOLIC BLOOD PRESSURE: 116 MMHG | WEIGHT: 258 LBS | DIASTOLIC BLOOD PRESSURE: 73 MMHG | TEMPERATURE: 97.9 F | BODY MASS INDEX: 38.21 KG/M2

## 2017-07-12 LAB
ALBUMIN SERPL-MCNC: 3.5 G/DL (ref 3.9–4.9)
ALP BLD-CCNC: 139 U/L (ref 40–130)
ALT SERPL-CCNC: 10 U/L (ref 0–33)
AMPHETAMINE SCREEN, URINE: NORMAL
AMYLASE: 67 U/L (ref 28–100)
ANION GAP SERPL CALCULATED.3IONS-SCNC: 14 MEQ/L (ref 7–13)
AST SERPL-CCNC: 6 U/L (ref 0–35)
BARBITURATE SCREEN URINE: NORMAL
BASOPHILS ABSOLUTE: 0 K/UL (ref 0–0.2)
BASOPHILS RELATIVE PERCENT: 0.3 %
BENZODIAZEPINE SCREEN, URINE: NORMAL
BILIRUB SERPL-MCNC: 0.1 MG/DL (ref 0–1.2)
BILIRUBIN URINE: NEGATIVE
BLOOD, URINE: NEGATIVE
BUN BLDV-MCNC: 8 MG/DL (ref 6–20)
CALCIUM SERPL-MCNC: 9.2 MG/DL (ref 8.6–10.2)
CANNABINOID SCREEN URINE: NORMAL
CHLORIDE BLD-SCNC: 105 MEQ/L (ref 98–107)
CLARITY: ABNORMAL
CLUE CELLS: NORMAL
CO2: 18 MEQ/L (ref 22–29)
COCAINE METABOLITE SCREEN URINE: NORMAL
COLOR: YELLOW
CREAT SERPL-MCNC: 0.44 MG/DL (ref 0.5–0.9)
EOSINOPHILS ABSOLUTE: 0.1 K/UL (ref 0–0.7)
EOSINOPHILS RELATIVE PERCENT: 0.6 %
FETAL FIBRONECTIN: NEGATIVE
GFR AFRICAN AMERICAN: >60
GFR NON-AFRICAN AMERICAN: >60
GLOBULIN: 2.7 G/DL (ref 2.3–3.5)
GLUCOSE BLD-MCNC: 115 MG/DL (ref 74–109)
GLUCOSE URINE: NEGATIVE MG/DL
HCT VFR BLD CALC: 35.6 % (ref 37–47)
HEMOGLOBIN: 11.6 G/DL (ref 12–16)
KETONES, URINE: NEGATIVE MG/DL
LEUKOCYTE ESTERASE, URINE: NEGATIVE
LIPASE: 27 U/L (ref 13–60)
LYMPHOCYTES ABSOLUTE: 1 K/UL (ref 1–4.8)
LYMPHOCYTES RELATIVE PERCENT: 6.4 %
Lab: NORMAL
MCH RBC QN AUTO: 29 PG (ref 27–31.3)
MCHC RBC AUTO-ENTMCNC: 32.6 % (ref 33–37)
MCV RBC AUTO: 89.1 FL (ref 82–100)
MONOCYTES ABSOLUTE: 0.8 K/UL (ref 0.2–0.8)
MONOCYTES RELATIVE PERCENT: 4.9 %
NEUTROPHILS ABSOLUTE: 14.2 K/UL (ref 1.4–6.5)
NEUTROPHILS RELATIVE PERCENT: 87.8 %
NITRITE, URINE: NEGATIVE
OPIATE SCREEN URINE: NORMAL
PDW BLD-RTO: 12.8 % (ref 11.5–14.5)
PH UA: 7 (ref 5–9)
PHENCYCLIDINE SCREEN URINE: NORMAL
PLATELET # BLD: 325 K/UL (ref 130–400)
POTASSIUM SERPL-SCNC: 4.5 MEQ/L (ref 3.5–5.1)
PROTEIN UA: NEGATIVE MG/DL
RBC # BLD: 4 M/UL (ref 4.2–5.4)
SODIUM BLD-SCNC: 137 MEQ/L (ref 132–144)
SPECIFIC GRAVITY UA: 1.01 (ref 1–1.03)
TOTAL PROTEIN: 6.2 G/DL (ref 6.4–8.1)
TRICHOMONAS PREP: NORMAL
TRICHOMONAS VAGINALIS SCREEN: NEGATIVE
URIC ACID, SERUM: 3.4 MG/DL (ref 2.4–5.7)
UROBILINOGEN, URINE: 0.2 E.U./DL
WBC # BLD: 16.1 K/UL (ref 4.5–11)
YEAST WET PREP: NORMAL

## 2017-07-12 PROCEDURE — 80307 DRUG TEST PRSMV CHEM ANLYZR: CPT

## 2017-07-12 PROCEDURE — 80053 COMPREHEN METABOLIC PANEL: CPT

## 2017-07-12 PROCEDURE — 85025 COMPLETE CBC W/AUTO DIFF WBC: CPT

## 2017-07-12 PROCEDURE — 84550 ASSAY OF BLOOD/URIC ACID: CPT

## 2017-07-12 PROCEDURE — 87210 SMEAR WET MOUNT SALINE/INK: CPT

## 2017-07-12 PROCEDURE — 99283 EMERGENCY DEPT VISIT LOW MDM: CPT | Performed by: OBSTETRICS & GYNECOLOGY

## 2017-07-12 PROCEDURE — 87660 TRICHOMONAS VAGIN DIR PROBE: CPT

## 2017-07-12 PROCEDURE — 87070 CULTURE OTHR SPECIMN AEROBIC: CPT

## 2017-07-12 PROCEDURE — 82731 ASSAY OF FETAL FIBRONECTIN: CPT

## 2017-07-12 PROCEDURE — 81003 URINALYSIS AUTO W/O SCOPE: CPT

## 2017-07-12 PROCEDURE — 82150 ASSAY OF AMYLASE: CPT

## 2017-07-12 PROCEDURE — 59025 FETAL NON-STRESS TEST: CPT

## 2017-07-12 PROCEDURE — 83690 ASSAY OF LIPASE: CPT

## 2017-07-12 PROCEDURE — 87491 CHLMYD TRACH DNA AMP PROBE: CPT

## 2017-07-12 PROCEDURE — 87591 N.GONORRHOEAE DNA AMP PROB: CPT

## 2017-07-12 PROCEDURE — 36415 COLL VENOUS BLD VENIPUNCTURE: CPT

## 2017-07-12 PROCEDURE — 99283 EMERGENCY DEPT VISIT LOW MDM: CPT

## 2017-07-12 RX ORDER — ACETAMINOPHEN 325 MG/1
650 TABLET ORAL EVERY 4 HOURS PRN
Status: DISCONTINUED | OUTPATIENT
Start: 2017-07-12 | End: 2017-07-12 | Stop reason: HOSPADM

## 2017-07-14 LAB — GENITAL CULTURE, ROUTINE: NORMAL

## 2017-07-17 LAB
C TRACH DNA GENITAL QL NAA+PROBE: NEGATIVE
N. GONORRHOEAE DNA: NEGATIVE

## 2017-07-21 ENCOUNTER — ROUTINE PRENATAL (OUTPATIENT)
Dept: OBGYN | Age: 22
End: 2017-07-21

## 2017-07-21 VITALS
BODY MASS INDEX: 40.01 KG/M2 | WEIGHT: 267 LBS | DIASTOLIC BLOOD PRESSURE: 64 MMHG | SYSTOLIC BLOOD PRESSURE: 100 MMHG | HEART RATE: 136 BPM

## 2017-07-21 DIAGNOSIS — O21.0 HYPEREMESIS COMPLICATING PREGNANCY, ANTEPARTUM: ICD-10-CM

## 2017-07-21 DIAGNOSIS — O24.113 TYPE 2 DIABETES MELLITUS IN PREGNANCY, THIRD TRIMESTER: ICD-10-CM

## 2017-07-21 DIAGNOSIS — Z34.03 SUPERVISION OF NORMAL FIRST PREGNANCY IN THIRD TRIMESTER: Primary | ICD-10-CM

## 2017-07-21 DIAGNOSIS — O99.210 OBESITY AFFECTING PREGNANCY: ICD-10-CM

## 2017-07-21 LAB
BILIRUBIN, POC: ABNORMAL
BLOOD URINE, POC: ABNORMAL
CLARITY, POC: CLEAR
COLOR, POC: YELLOW
GLUCOSE URINE, POC: ABNORMAL
KETONES, POC: ABNORMAL
LEUKOCYTE EST, POC: ABNORMAL
NITRITE, POC: ABNORMAL
PH, POC: 6
PROTEIN, POC: ABNORMAL
SPECIFIC GRAVITY, POC: 1.03
UROBILINOGEN, POC: ABNORMAL

## 2017-07-21 PROCEDURE — 81003 URINALYSIS AUTO W/O SCOPE: CPT | Performed by: OBSTETRICS & GYNECOLOGY

## 2017-07-21 PROCEDURE — 0502F SUBSEQUENT PRENATAL CARE: CPT | Performed by: OBSTETRICS & GYNECOLOGY

## 2017-07-27 ENCOUNTER — ROUTINE PRENATAL (OUTPATIENT)
Dept: OBGYN | Age: 22
End: 2017-07-27

## 2017-07-27 VITALS
HEART RATE: 104 BPM | BODY MASS INDEX: 40.46 KG/M2 | SYSTOLIC BLOOD PRESSURE: 126 MMHG | DIASTOLIC BLOOD PRESSURE: 78 MMHG | WEIGHT: 270 LBS

## 2017-07-27 DIAGNOSIS — O24.113 TYPE 2 DIABETES MELLITUS IN PREGNANCY, THIRD TRIMESTER: ICD-10-CM

## 2017-07-27 DIAGNOSIS — Z34.03 SUPERVISION OF NORMAL FIRST PREGNANCY IN THIRD TRIMESTER: Primary | ICD-10-CM

## 2017-07-27 DIAGNOSIS — O21.0 HYPEREMESIS COMPLICATING PREGNANCY, ANTEPARTUM: ICD-10-CM

## 2017-07-27 DIAGNOSIS — O99.210 OBESITY AFFECTING PREGNANCY: ICD-10-CM

## 2017-07-27 PROCEDURE — 81003 URINALYSIS AUTO W/O SCOPE: CPT | Performed by: OBSTETRICS & GYNECOLOGY

## 2017-07-27 PROCEDURE — 99213 OFFICE O/P EST LOW 20 MIN: CPT | Performed by: OBSTETRICS & GYNECOLOGY

## 2017-07-27 PROCEDURE — 59025 FETAL NON-STRESS TEST: CPT | Performed by: OBSTETRICS & GYNECOLOGY

## 2017-08-07 ENCOUNTER — ROUTINE PRENATAL (OUTPATIENT)
Dept: OBGYN | Age: 22
End: 2017-08-07

## 2017-08-07 VITALS
DIASTOLIC BLOOD PRESSURE: 84 MMHG | HEART RATE: 64 BPM | SYSTOLIC BLOOD PRESSURE: 124 MMHG | BODY MASS INDEX: 41.51 KG/M2 | WEIGHT: 277 LBS

## 2017-08-07 DIAGNOSIS — O21.0 HYPEREMESIS COMPLICATING PREGNANCY, ANTEPARTUM: ICD-10-CM

## 2017-08-07 DIAGNOSIS — Z34.03 SUPERVISION OF NORMAL FIRST PREGNANCY IN THIRD TRIMESTER: Primary | ICD-10-CM

## 2017-08-07 DIAGNOSIS — O24.419 GESTATIONAL DIABETES MELLITUS (GDM) AFFECTING PREGNANCY: ICD-10-CM

## 2017-08-07 DIAGNOSIS — Z34.03 SUPERVISION OF NORMAL FIRST PREGNANCY IN THIRD TRIMESTER: ICD-10-CM

## 2017-08-07 DIAGNOSIS — O99.210 OBESITY AFFECTING PREGNANCY: ICD-10-CM

## 2017-08-07 LAB
BACTERIA: ABNORMAL /HPF
BILIRUBIN URINE: NEGATIVE
BILIRUBIN, POC: ABNORMAL
BLOOD URINE, POC: ABNORMAL
BLOOD, URINE: NEGATIVE
CLARITY, POC: CLEAR
CLARITY: CLEAR
COLOR, POC: YELLOW
COLOR: YELLOW
GLUCOSE URINE, POC: ABNORMAL
GLUCOSE URINE: 100 MG/DL
KETONES, POC: ABNORMAL
KETONES, URINE: NEGATIVE MG/DL
LEUKOCYTE EST, POC: ABNORMAL
LEUKOCYTE ESTERASE, URINE: ABNORMAL
MUCUS: PRESENT
NITRITE, POC: ABNORMAL
NITRITE, URINE: NEGATIVE
PH UA: 7 (ref 5–9)
PH, POC: 6.5
PROTEIN UA: NEGATIVE MG/DL
PROTEIN, POC: ABNORMAL
RBC UA: ABNORMAL /HPF (ref 0–2)
SPECIFIC GRAVITY UA: 1.01 (ref 1–1.03)
SPECIFIC GRAVITY, POC: 1.02
UROBILINOGEN, POC: ABNORMAL
UROBILINOGEN, URINE: 0.2 E.U./DL
WBC UA: ABNORMAL /HPF (ref 0–5)

## 2017-08-07 PROCEDURE — 0502F SUBSEQUENT PRENATAL CARE: CPT | Performed by: OBSTETRICS & GYNECOLOGY

## 2017-08-07 PROCEDURE — 81003 URINALYSIS AUTO W/O SCOPE: CPT | Performed by: OBSTETRICS & GYNECOLOGY

## 2017-08-09 LAB — URINE CULTURE, ROUTINE: NORMAL

## 2017-08-14 ENCOUNTER — ROUTINE PRENATAL (OUTPATIENT)
Dept: OBGYN | Age: 22
End: 2017-08-14

## 2017-08-14 VITALS — DIASTOLIC BLOOD PRESSURE: 86 MMHG | HEART RATE: 104 BPM | SYSTOLIC BLOOD PRESSURE: 122 MMHG

## 2017-08-14 DIAGNOSIS — O21.0 HYPEREMESIS COMPLICATING PREGNANCY, ANTEPARTUM: ICD-10-CM

## 2017-08-14 DIAGNOSIS — Z34.03 SUPERVISION OF NORMAL FIRST PREGNANCY IN THIRD TRIMESTER: ICD-10-CM

## 2017-08-14 DIAGNOSIS — O99.210 OBESITY AFFECTING PREGNANCY: ICD-10-CM

## 2017-08-14 DIAGNOSIS — O24.419 GESTATIONAL DIABETES MELLITUS (GDM) AFFECTING PREGNANCY: ICD-10-CM

## 2017-08-14 DIAGNOSIS — Z34.03 SUPERVISION OF NORMAL FIRST PREGNANCY IN THIRD TRIMESTER: Primary | ICD-10-CM

## 2017-08-14 LAB
BACTERIA: NORMAL /HPF
BILIRUBIN URINE: NEGATIVE
BILIRUBIN, POC: ABNORMAL
BLOOD URINE, POC: ABNORMAL
BLOOD, URINE: NEGATIVE
CLARITY, POC: CLEAR
CLARITY: CLEAR
COLOR, POC: YELLOW
COLOR: YELLOW
GLUCOSE URINE, POC: ABNORMAL
GLUCOSE URINE: NEGATIVE MG/DL
KETONES, POC: ABNORMAL
KETONES, URINE: NEGATIVE MG/DL
LEUKOCYTE EST, POC: ABNORMAL
LEUKOCYTE ESTERASE, URINE: ABNORMAL
MUCUS: PRESENT
NITRITE, POC: ABNORMAL
NITRITE, URINE: NEGATIVE
PH UA: 6.5 (ref 5–9)
PH, POC: 6
PROTEIN UA: NEGATIVE MG/DL
PROTEIN, POC: ABNORMAL
RBC UA: NORMAL /HPF (ref 0–2)
SPECIFIC GRAVITY UA: 1.02 (ref 1–1.03)
SPECIFIC GRAVITY, POC: 1.02
UROBILINOGEN, POC: ABNORMAL
UROBILINOGEN, URINE: 0.2 E.U./DL
WBC UA: NORMAL /HPF (ref 0–5)

## 2017-08-14 PROCEDURE — 81003 URINALYSIS AUTO W/O SCOPE: CPT | Performed by: OBSTETRICS & GYNECOLOGY

## 2017-08-14 PROCEDURE — 0502F SUBSEQUENT PRENATAL CARE: CPT | Performed by: OBSTETRICS & GYNECOLOGY

## 2017-08-16 LAB — URINE CULTURE, ROUTINE: NORMAL

## 2017-08-21 ENCOUNTER — PREP FOR PROCEDURE (OUTPATIENT)
Dept: OBGYN | Age: 22
End: 2017-08-21

## 2017-08-21 ENCOUNTER — ROUTINE PRENATAL (OUTPATIENT)
Dept: OBGYN | Age: 22
End: 2017-08-21

## 2017-08-21 VITALS
HEART RATE: 136 BPM | BODY MASS INDEX: 41.65 KG/M2 | SYSTOLIC BLOOD PRESSURE: 120 MMHG | DIASTOLIC BLOOD PRESSURE: 66 MMHG | WEIGHT: 278 LBS

## 2017-08-21 DIAGNOSIS — Z34.03 SUPERVISION OF NORMAL FIRST PREGNANCY IN THIRD TRIMESTER: Primary | ICD-10-CM

## 2017-08-21 DIAGNOSIS — O21.0 HYPEREMESIS COMPLICATING PREGNANCY, ANTEPARTUM: ICD-10-CM

## 2017-08-21 DIAGNOSIS — O24.419 GESTATIONAL DIABETES MELLITUS (GDM) AFFECTING PREGNANCY: ICD-10-CM

## 2017-08-21 DIAGNOSIS — O99.210 OBESITY AFFECTING PREGNANCY: ICD-10-CM

## 2017-08-21 LAB
BILIRUBIN URINE: NEGATIVE
BILIRUBIN, POC: ABNORMAL
BLOOD URINE, POC: ABNORMAL
BLOOD, URINE: NEGATIVE
CLARITY, POC: CLEAR
CLARITY: CLEAR
COLOR, POC: YELLOW
COLOR: YELLOW
GLUCOSE URINE, POC: ABNORMAL
GLUCOSE URINE: NEGATIVE MG/DL
KETONES, POC: ABNORMAL
KETONES, URINE: NEGATIVE MG/DL
LEUKOCYTE EST, POC: ABNORMAL
LEUKOCYTE ESTERASE, URINE: ABNORMAL
MUCUS: PRESENT
NITRITE, POC: ABNORMAL
NITRITE, URINE: NEGATIVE
PH UA: 7 (ref 5–9)
PH, POC: 6.5
PROTEIN UA: NEGATIVE MG/DL
PROTEIN, POC: ABNORMAL
RBC UA: NORMAL /HPF (ref 0–2)
SPECIFIC GRAVITY UA: 1.02 (ref 1–1.03)
SPECIFIC GRAVITY, POC: 1.02
UROBILINOGEN, POC: ABNORMAL
UROBILINOGEN, URINE: 0.2 E.U./DL
WBC UA: NORMAL /HPF (ref 0–5)

## 2017-08-21 PROCEDURE — 81003 URINALYSIS AUTO W/O SCOPE: CPT | Performed by: OBSTETRICS & GYNECOLOGY

## 2017-08-21 PROCEDURE — 0502F SUBSEQUENT PRENATAL CARE: CPT | Performed by: OBSTETRICS & GYNECOLOGY

## 2017-08-21 RX ORDER — SODIUM CHLORIDE 0.9 % (FLUSH) 0.9 %
10 SYRINGE (ML) INJECTION EVERY 12 HOURS SCHEDULED
Status: DISCONTINUED | OUTPATIENT
Start: 2017-08-21 | End: 2023-05-10 | Stop reason: HOSPADM

## 2017-08-21 RX ORDER — SODIUM CHLORIDE 0.9 % (FLUSH) 0.9 %
10 SYRINGE (ML) INJECTION PRN
Status: DISCONTINUED | OUTPATIENT
Start: 2017-08-21 | End: 2023-05-10 | Stop reason: HOSPADM

## 2017-08-21 RX ORDER — BISACODYL 10 MG
10 SUPPOSITORY, RECTAL RECTAL DAILY PRN
Status: DISCONTINUED | OUTPATIENT
Start: 2017-08-21 | End: 2023-05-10 | Stop reason: HOSPADM

## 2017-08-21 RX ORDER — NALBUPHINE HCL 10 MG/ML
10 AMPUL (ML) INJECTION
Status: DISCONTINUED | OUTPATIENT
Start: 2017-08-21 | End: 2023-05-10 | Stop reason: HOSPADM

## 2017-08-21 RX ORDER — SODIUM CHLORIDE, SODIUM LACTATE, POTASSIUM CHLORIDE, CALCIUM CHLORIDE 600; 310; 30; 20 MG/100ML; MG/100ML; MG/100ML; MG/100ML
INJECTION, SOLUTION INTRAVENOUS CONTINUOUS
Status: DISCONTINUED | OUTPATIENT
Start: 2017-08-21 | End: 2023-05-10 | Stop reason: HOSPADM

## 2017-08-21 RX ORDER — SIMETHICONE 80 MG
80 TABLET,CHEWABLE ORAL EVERY 6 HOURS PRN
Status: DISCONTINUED | OUTPATIENT
Start: 2017-08-21 | End: 2023-05-10 | Stop reason: HOSPADM

## 2017-08-21 RX ORDER — ONDANSETRON 2 MG/ML
4 INJECTION INTRAMUSCULAR; INTRAVENOUS EVERY 6 HOURS PRN
Status: DISCONTINUED | OUTPATIENT
Start: 2017-08-21 | End: 2023-05-10 | Stop reason: HOSPADM

## 2017-08-21 RX ORDER — DOCUSATE SODIUM 100 MG/1
100 CAPSULE, LIQUID FILLED ORAL 2 TIMES DAILY
Status: DISCONTINUED | OUTPATIENT
Start: 2017-08-21 | End: 2023-05-10 | Stop reason: HOSPADM

## 2017-08-22 ENCOUNTER — ANESTHESIA EVENT (OUTPATIENT)
Dept: LABOR AND DELIVERY | Age: 22
End: 2017-08-22
Payer: COMMERCIAL

## 2017-08-22 ENCOUNTER — HOSPITAL ENCOUNTER (INPATIENT)
Dept: LABOR AND DELIVERY | Age: 22
LOS: 3 days | Discharge: HOME | End: 2017-08-25
Attending: OBSTETRICS & GYNECOLOGY | Admitting: OBSTETRICS & GYNECOLOGY
Payer: COMMERCIAL

## 2017-08-22 ENCOUNTER — ANESTHESIA (OUTPATIENT)
Dept: LABOR AND DELIVERY | Age: 22
End: 2017-08-22
Payer: COMMERCIAL

## 2017-08-22 VITALS — SYSTOLIC BLOOD PRESSURE: 154 MMHG | DIASTOLIC BLOOD PRESSURE: 80 MMHG

## 2017-08-22 LAB
ABO/RH: NORMAL
AMPHETAMINE SCREEN, URINE: NORMAL
ANTIBODY SCREEN: NORMAL
BACTERIA: ABNORMAL /HPF
BARBITURATE SCREEN URINE: NORMAL
BASOPHILS ABSOLUTE: 0 K/UL (ref 0–0.2)
BASOPHILS RELATIVE PERCENT: 0.2 %
BENZODIAZEPINE SCREEN, URINE: NORMAL
BILIRUBIN URINE: NEGATIVE
BLOOD, URINE: NEGATIVE
CANNABINOID SCREEN URINE: NORMAL
CLARITY: CLEAR
COCAINE METABOLITE SCREEN URINE: NORMAL
COLOR: YELLOW
CRYSTALS, UA: ABNORMAL
EOSINOPHILS ABSOLUTE: 0.1 K/UL (ref 0–0.7)
EOSINOPHILS RELATIVE PERCENT: 1 %
EPITHELIAL CELLS, UA: ABNORMAL /HPF
GLUCOSE URINE: NEGATIVE MG/DL
HCT VFR BLD CALC: 35.9 % (ref 37–47)
HEMOGLOBIN: 11.8 G/DL (ref 12–16)
HEPATITIS B SURFACE ANTIGEN INTERPRETATION: NORMAL
KETONES, URINE: NEGATIVE MG/DL
LEUKOCYTE ESTERASE, URINE: ABNORMAL
LYMPHOCYTES ABSOLUTE: 0.9 K/UL (ref 1–4.8)
LYMPHOCYTES RELATIVE PERCENT: 7.8 %
Lab: NORMAL
MCH RBC QN AUTO: 28.4 PG (ref 27–31.3)
MCHC RBC AUTO-ENTMCNC: 32.8 % (ref 33–37)
MCV RBC AUTO: 86.7 FL (ref 82–100)
MONOCYTES ABSOLUTE: 0.6 K/UL (ref 0.2–0.8)
MONOCYTES RELATIVE PERCENT: 5.5 %
MUCUS: PRESENT
NEUTROPHILS ABSOLUTE: 10 K/UL (ref 1.4–6.5)
NEUTROPHILS RELATIVE PERCENT: 85.5 %
NITRITE, URINE: NEGATIVE
OPIATE SCREEN URINE: NORMAL
PDW BLD-RTO: 13.6 % (ref 11.5–14.5)
PH UA: 6.5 (ref 5–9)
PHENCYCLIDINE SCREEN URINE: NORMAL
PLATELET # BLD: 270 K/UL (ref 130–400)
PROTEIN UA: NEGATIVE MG/DL
RBC # BLD: 4.14 M/UL (ref 4.2–5.4)
RBC UA: ABNORMAL /HPF (ref 0–2)
SPECIFIC GRAVITY UA: 1.01 (ref 1–1.03)
UROBILINOGEN, URINE: 0.2 E.U./DL
WBC # BLD: 11.7 K/UL (ref 4.5–11)
WBC UA: ABNORMAL /HPF (ref 0–5)

## 2017-08-22 PROCEDURE — 0503F POSTPARTUM CARE VISIT: CPT | Performed by: OBSTETRICS & GYNECOLOGY

## 2017-08-22 PROCEDURE — 4A1HXCZ MONITORING OF PRODUCTS OF CONCEPTION, CARDIAC RATE, EXTERNAL APPROACH: ICD-10-PCS | Performed by: OBSTETRICS & GYNECOLOGY

## 2017-08-22 PROCEDURE — 3E0P7GC INTRODUCTION OF OTHER THERAPEUTIC SUBSTANCE INTO FEMALE REPRODUCTIVE, VIA NATURAL OR ARTIFICIAL OPENING: ICD-10-PCS | Performed by: OBSTETRICS & GYNECOLOGY

## 2017-08-22 PROCEDURE — 86850 RBC ANTIBODY SCREEN: CPT

## 2017-08-22 PROCEDURE — 2580000003 HC RX 258: Performed by: OBSTETRICS & GYNECOLOGY

## 2017-08-22 PROCEDURE — 86901 BLOOD TYPING SEROLOGIC RH(D): CPT

## 2017-08-22 PROCEDURE — 86900 BLOOD TYPING SEROLOGIC ABO: CPT

## 2017-08-22 PROCEDURE — 80307 DRUG TEST PRSMV CHEM ANLYZR: CPT

## 2017-08-22 PROCEDURE — 86592 SYPHILIS TEST NON-TREP QUAL: CPT

## 2017-08-22 PROCEDURE — 3700000025 ANESTHESIA EPIDURAL BLOCK: Performed by: NURSE ANESTHETIST, CERTIFIED REGISTERED

## 2017-08-22 PROCEDURE — 10907ZC DRAINAGE OF AMNIOTIC FLUID, THERAPEUTIC FROM PRODUCTS OF CONCEPTION, VIA NATURAL OR ARTIFICIAL OPENING: ICD-10-PCS | Performed by: OBSTETRICS & GYNECOLOGY

## 2017-08-22 PROCEDURE — 36415 COLL VENOUS BLD VENIPUNCTURE: CPT

## 2017-08-22 PROCEDURE — 87340 HEPATITIS B SURFACE AG IA: CPT

## 2017-08-22 PROCEDURE — 1220000000 HC SEMI PRIVATE OB R&B

## 2017-08-22 PROCEDURE — 85025 COMPLETE CBC W/AUTO DIFF WBC: CPT

## 2017-08-22 PROCEDURE — 2500000003 HC RX 250 WO HCPCS: Performed by: OBSTETRICS & GYNECOLOGY

## 2017-08-22 PROCEDURE — 81001 URINALYSIS AUTO W/SCOPE: CPT

## 2017-08-22 PROCEDURE — 6370000000 HC RX 637 (ALT 250 FOR IP): Performed by: OBSTETRICS & GYNECOLOGY

## 2017-08-22 RX ORDER — NALOXONE HYDROCHLORIDE 0.4 MG/ML
0.4 INJECTION, SOLUTION INTRAMUSCULAR; INTRAVENOUS; SUBCUTANEOUS PRN
Status: DISCONTINUED | OUTPATIENT
Start: 2017-08-22 | End: 2017-08-23

## 2017-08-22 RX ORDER — NALBUPHINE HCL 10 MG/ML
10 AMPUL (ML) INJECTION
Status: DISCONTINUED | OUTPATIENT
Start: 2017-08-22 | End: 2017-08-23

## 2017-08-22 RX ORDER — SODIUM CHLORIDE 0.9 % (FLUSH) 0.9 %
10 SYRINGE (ML) INJECTION EVERY 12 HOURS SCHEDULED
Status: DISCONTINUED | OUTPATIENT
Start: 2017-08-22 | End: 2017-08-23

## 2017-08-22 RX ORDER — SIMETHICONE 80 MG
80 TABLET,CHEWABLE ORAL EVERY 6 HOURS PRN
Status: DISCONTINUED | OUTPATIENT
Start: 2017-08-22 | End: 2017-08-23

## 2017-08-22 RX ORDER — ONDANSETRON 2 MG/ML
4 INJECTION INTRAMUSCULAR; INTRAVENOUS EVERY 6 HOURS PRN
Status: DISCONTINUED | OUTPATIENT
Start: 2017-08-22 | End: 2017-08-23

## 2017-08-22 RX ORDER — SODIUM CHLORIDE 0.9 % (FLUSH) 0.9 %
10 SYRINGE (ML) INJECTION PRN
Status: DISCONTINUED | OUTPATIENT
Start: 2017-08-22 | End: 2017-08-23

## 2017-08-22 RX ORDER — BISACODYL 10 MG
10 SUPPOSITORY, RECTAL RECTAL DAILY PRN
Status: DISCONTINUED | OUTPATIENT
Start: 2017-08-22 | End: 2017-08-23

## 2017-08-22 RX ORDER — DOCUSATE SODIUM 100 MG/1
100 CAPSULE, LIQUID FILLED ORAL 2 TIMES DAILY
Status: DISCONTINUED | OUTPATIENT
Start: 2017-08-22 | End: 2017-08-23

## 2017-08-22 RX ORDER — NALBUPHINE HCL 10 MG/ML
5 AMPUL (ML) INJECTION EVERY 4 HOURS PRN
Status: DISCONTINUED | OUTPATIENT
Start: 2017-08-22 | End: 2017-08-23

## 2017-08-22 RX ORDER — SODIUM CHLORIDE, SODIUM LACTATE, POTASSIUM CHLORIDE, CALCIUM CHLORIDE 600; 310; 30; 20 MG/100ML; MG/100ML; MG/100ML; MG/100ML
INJECTION, SOLUTION INTRAVENOUS CONTINUOUS
Status: DISCONTINUED | OUTPATIENT
Start: 2017-08-22 | End: 2017-08-23

## 2017-08-22 RX ORDER — ONDANSETRON 2 MG/ML
4 INJECTION INTRAMUSCULAR; INTRAVENOUS EVERY 6 HOURS PRN
Status: DISCONTINUED | OUTPATIENT
Start: 2017-08-22 | End: 2017-08-22 | Stop reason: SDUPTHER

## 2017-08-22 RX ADMIN — SODIUM CHLORIDE, POTASSIUM CHLORIDE, SODIUM LACTATE AND CALCIUM CHLORIDE: 600; 310; 30; 20 INJECTION, SOLUTION INTRAVENOUS at 23:35

## 2017-08-22 RX ADMIN — DINOPROSTONE 10 MG: 10 INSERT, EXTENDED RELEASE VAGINAL at 13:30

## 2017-08-22 RX ADMIN — SODIUM CHLORIDE, POTASSIUM CHLORIDE, SODIUM LACTATE AND CALCIUM CHLORIDE: 600; 310; 30; 20 INJECTION, SOLUTION INTRAVENOUS at 22:35

## 2017-08-22 RX ADMIN — Medication 12 ML/HR: at 23:20

## 2017-08-23 LAB
RPR: NORMAL
URINE CULTURE, ROUTINE: NORMAL

## 2017-08-23 PROCEDURE — 2580000003 HC RX 258: Performed by: OBSTETRICS & GYNECOLOGY

## 2017-08-23 PROCEDURE — 51702 INSERT TEMP BLADDER CATH: CPT

## 2017-08-23 PROCEDURE — 6370000000 HC RX 637 (ALT 250 FOR IP): Performed by: OBSTETRICS & GYNECOLOGY

## 2017-08-23 PROCEDURE — 2500000003 HC RX 250 WO HCPCS: Performed by: OBSTETRICS & GYNECOLOGY

## 2017-08-23 PROCEDURE — 59400 OBSTETRICAL CARE: CPT | Performed by: OBSTETRICS & GYNECOLOGY

## 2017-08-23 PROCEDURE — 0KQM0ZZ REPAIR PERINEUM MUSCLE, OPEN APPROACH: ICD-10-PCS | Performed by: OBSTETRICS & GYNECOLOGY

## 2017-08-23 PROCEDURE — 1220000000 HC SEMI PRIVATE OB R&B

## 2017-08-23 PROCEDURE — 6360000002 HC RX W HCPCS: Performed by: OBSTETRICS & GYNECOLOGY

## 2017-08-23 RX ORDER — ACETAMINOPHEN 500 MG
1000 TABLET ORAL EVERY 6 HOURS PRN
Status: DISCONTINUED | OUTPATIENT
Start: 2017-08-23 | End: 2017-08-23

## 2017-08-23 RX ORDER — FERROUS SULFATE 325(65) MG
325 TABLET ORAL 2 TIMES DAILY WITH MEALS
Status: DISCONTINUED | OUTPATIENT
Start: 2017-08-23 | End: 2017-08-25 | Stop reason: HOSPADM

## 2017-08-23 RX ORDER — SODIUM CHLORIDE 0.9 % (FLUSH) 0.9 %
10 SYRINGE (ML) INJECTION PRN
Status: DISCONTINUED | OUTPATIENT
Start: 2017-08-23 | End: 2017-08-25 | Stop reason: HOSPADM

## 2017-08-23 RX ORDER — SODIUM CHLORIDE 0.9 % (FLUSH) 0.9 %
10 SYRINGE (ML) INJECTION EVERY 12 HOURS SCHEDULED
Status: DISCONTINUED | OUTPATIENT
Start: 2017-08-23 | End: 2017-08-25 | Stop reason: HOSPADM

## 2017-08-23 RX ORDER — IBUPROFEN 600 MG/1
600 TABLET ORAL EVERY 6 HOURS PRN
Status: DISCONTINUED | OUTPATIENT
Start: 2017-08-23 | End: 2017-08-25 | Stop reason: HOSPADM

## 2017-08-23 RX ORDER — ACETAMINOPHEN 325 MG/1
650 TABLET ORAL EVERY 4 HOURS PRN
Status: DISCONTINUED | OUTPATIENT
Start: 2017-08-23 | End: 2017-08-25 | Stop reason: HOSPADM

## 2017-08-23 RX ORDER — FAMOTIDINE 20 MG/1
20 TABLET, FILM COATED ORAL 2 TIMES DAILY
Status: DISCONTINUED | OUTPATIENT
Start: 2017-08-23 | End: 2017-08-25 | Stop reason: HOSPADM

## 2017-08-23 RX ORDER — SODIUM CHLORIDE, SODIUM LACTATE, POTASSIUM CHLORIDE, CALCIUM CHLORIDE 600; 310; 30; 20 MG/100ML; MG/100ML; MG/100ML; MG/100ML
INJECTION, SOLUTION INTRAVENOUS CONTINUOUS
Status: DISCONTINUED | OUTPATIENT
Start: 2017-08-23 | End: 2017-08-25 | Stop reason: HOSPADM

## 2017-08-23 RX ORDER — LANOLIN 100 %
OINTMENT (GRAM) TOPICAL PRN
Status: DISCONTINUED | OUTPATIENT
Start: 2017-08-23 | End: 2017-08-25 | Stop reason: HOSPADM

## 2017-08-23 RX ORDER — DOCUSATE SODIUM 100 MG/1
100 CAPSULE, LIQUID FILLED ORAL 2 TIMES DAILY
Status: DISCONTINUED | OUTPATIENT
Start: 2017-08-23 | End: 2017-08-25 | Stop reason: HOSPADM

## 2017-08-23 RX ORDER — LIDOCAINE HYDROCHLORIDE 20 MG/ML
INJECTION, SOLUTION INFILTRATION; PERINEURAL
Status: DISCONTINUED
Start: 2017-08-23 | End: 2017-08-23

## 2017-08-23 RX ADMIN — ACETAMINOPHEN 1000 MG: 500 TABLET ORAL at 07:14

## 2017-08-23 RX ADMIN — Medication 1 MILLI-UNITS/MIN: at 08:29

## 2017-08-23 RX ADMIN — SODIUM CHLORIDE, POTASSIUM CHLORIDE, SODIUM LACTATE AND CALCIUM CHLORIDE: 600; 310; 30; 20 INJECTION, SOLUTION INTRAVENOUS at 03:01

## 2017-08-23 RX ADMIN — SODIUM CHLORIDE, POTASSIUM CHLORIDE, SODIUM LACTATE AND CALCIUM CHLORIDE: 600; 310; 30; 20 INJECTION, SOLUTION INTRAVENOUS at 00:30

## 2017-08-23 RX ADMIN — DOCUSATE SODIUM 100 MG: 100 CAPSULE, LIQUID FILLED ORAL at 14:54

## 2017-08-23 RX ADMIN — SODIUM CHLORIDE, POTASSIUM CHLORIDE, SODIUM LACTATE AND CALCIUM CHLORIDE: 600; 310; 30; 20 INJECTION, SOLUTION INTRAVENOUS at 08:22

## 2017-08-23 RX ADMIN — Medication 1 MILLI-UNITS/MIN: at 03:00

## 2017-08-23 RX ADMIN — SODIUM CHLORIDE, PRESERVATIVE FREE 10 ML: 5 INJECTION INTRAVENOUS at 16:17

## 2017-08-23 RX ADMIN — SODIUM CHLORIDE, POTASSIUM CHLORIDE, SODIUM LACTATE AND CALCIUM CHLORIDE: 600; 310; 30; 20 INJECTION, SOLUTION INTRAVENOUS at 08:28

## 2017-08-23 RX ADMIN — IBUPROFEN 600 MG: 600 TABLET, FILM COATED ORAL at 16:14

## 2017-08-23 RX ADMIN — Medication 10 ML/HR: at 07:42

## 2017-08-23 ASSESSMENT — PAIN SCALES - GENERAL
PAINLEVEL_OUTOF10: 0
PAINLEVEL_OUTOF10: 5

## 2017-08-24 LAB
BASOPHILS ABSOLUTE: 0.1 K/UL (ref 0–0.2)
BASOPHILS RELATIVE PERCENT: 0.5 %
EOSINOPHILS ABSOLUTE: 0.2 K/UL (ref 0–0.7)
EOSINOPHILS RELATIVE PERCENT: 0.8 %
HCT VFR BLD CALC: 31.7 % (ref 37–47)
HEMOGLOBIN: 10.3 G/DL (ref 12–16)
LYMPHOCYTES ABSOLUTE: 1.1 K/UL (ref 1–4.8)
LYMPHOCYTES RELATIVE PERCENT: 5.8 %
MCH RBC QN AUTO: 28.3 PG (ref 27–31.3)
MCHC RBC AUTO-ENTMCNC: 32.7 % (ref 33–37)
MCV RBC AUTO: 86.7 FL (ref 82–100)
MONOCYTES ABSOLUTE: 1.1 K/UL (ref 0.2–0.8)
MONOCYTES RELATIVE PERCENT: 5.7 %
NEUTROPHILS ABSOLUTE: 16.1 K/UL (ref 1.4–6.5)
NEUTROPHILS RELATIVE PERCENT: 87.2 %
PDW BLD-RTO: 13.8 % (ref 11.5–14.5)
PLATELET # BLD: 269 K/UL (ref 130–400)
RBC # BLD: 3.65 M/UL (ref 4.2–5.4)
WBC # BLD: 18.4 K/UL (ref 4.5–11)

## 2017-08-24 PROCEDURE — 36415 COLL VENOUS BLD VENIPUNCTURE: CPT

## 2017-08-24 PROCEDURE — 6370000000 HC RX 637 (ALT 250 FOR IP): Performed by: OBSTETRICS & GYNECOLOGY

## 2017-08-24 PROCEDURE — 85025 COMPLETE CBC W/AUTO DIFF WBC: CPT

## 2017-08-24 PROCEDURE — 99024 POSTOP FOLLOW-UP VISIT: CPT | Performed by: OBSTETRICS & GYNECOLOGY

## 2017-08-24 PROCEDURE — 1220000000 HC SEMI PRIVATE OB R&B

## 2017-08-24 RX ORDER — PNV NO.95/FERROUS FUM/FOLIC AC 28MG-0.8MG
1 TABLET ORAL 2 TIMES DAILY
Qty: 60 TABLET | Refills: 2 | Status: SHIPPED | OUTPATIENT
Start: 2017-08-24 | End: 2021-05-17

## 2017-08-24 RX ORDER — IBUPROFEN 600 MG/1
600 TABLET ORAL EVERY 6 HOURS PRN
Qty: 60 TABLET | Refills: 1 | Status: SHIPPED | OUTPATIENT
Start: 2017-08-24 | End: 2021-05-17

## 2017-08-24 RX ADMIN — DOCUSATE SODIUM 100 MG: 100 CAPSULE, LIQUID FILLED ORAL at 09:48

## 2017-08-24 RX ADMIN — FAMOTIDINE 20 MG: 20 TABLET ORAL at 21:12

## 2017-08-24 RX ADMIN — IBUPROFEN 600 MG: 600 TABLET, FILM COATED ORAL at 09:48

## 2017-08-24 RX ADMIN — IBUPROFEN 600 MG: 600 TABLET, FILM COATED ORAL at 03:14

## 2017-08-24 RX ADMIN — IBUPROFEN 600 MG: 600 TABLET, FILM COATED ORAL at 21:12

## 2017-08-24 RX ADMIN — DOCUSATE SODIUM 100 MG: 100 CAPSULE, LIQUID FILLED ORAL at 21:12

## 2017-08-24 RX ADMIN — ACETAMINOPHEN 650 MG: 325 TABLET ORAL at 03:49

## 2017-08-24 ASSESSMENT — PAIN SCALES - GENERAL
PAINLEVEL_OUTOF10: 6
PAINLEVEL_OUTOF10: 6
PAINLEVEL_OUTOF10: 7
PAINLEVEL_OUTOF10: 0

## 2017-08-24 ASSESSMENT — PAIN DESCRIPTION - FREQUENCY: FREQUENCY: CONTINUOUS

## 2017-08-24 ASSESSMENT — PAIN DESCRIPTION - LOCATION: LOCATION: ABDOMEN;PERINEUM

## 2017-08-24 ASSESSMENT — PAIN DESCRIPTION - PROGRESSION: CLINICAL_PROGRESSION: RAPIDLY WORSENING

## 2017-08-24 ASSESSMENT — PAIN DESCRIPTION - DESCRIPTORS: DESCRIPTORS: SHARP;CRAMPING;DISCOMFORT

## 2017-08-24 ASSESSMENT — PAIN DESCRIPTION - ORIENTATION: ORIENTATION: INNER;LOWER

## 2017-08-24 ASSESSMENT — PAIN DESCRIPTION - PAIN TYPE: TYPE: ACUTE PAIN

## 2017-08-24 ASSESSMENT — PAIN DESCRIPTION - ONSET: ONSET: SUDDEN

## 2017-08-25 VITALS
TEMPERATURE: 99 F | RESPIRATION RATE: 18 BRPM | DIASTOLIC BLOOD PRESSURE: 73 MMHG | HEART RATE: 93 BPM | OXYGEN SATURATION: 98 % | SYSTOLIC BLOOD PRESSURE: 134 MMHG

## 2017-08-25 LAB
HCT VFR BLD CALC: 32.7 % (ref 37–47)
HEMOGLOBIN: 10.7 G/DL (ref 12–16)
MCH RBC QN AUTO: 28.5 PG (ref 27–31.3)
MCHC RBC AUTO-ENTMCNC: 32.7 % (ref 33–37)
MCV RBC AUTO: 87.3 FL (ref 82–100)
PDW BLD-RTO: 14.1 % (ref 11.5–14.5)
PLATELET # BLD: 295 K/UL (ref 130–400)
RBC # BLD: 3.75 M/UL (ref 4.2–5.4)
WBC # BLD: 16.8 K/UL (ref 4.5–11)

## 2017-08-25 PROCEDURE — 85027 COMPLETE CBC AUTOMATED: CPT

## 2017-08-25 PROCEDURE — 6370000000 HC RX 637 (ALT 250 FOR IP): Performed by: OBSTETRICS & GYNECOLOGY

## 2017-08-25 PROCEDURE — 7200000001 HC VAGINAL DELIVERY

## 2017-08-25 PROCEDURE — 36415 COLL VENOUS BLD VENIPUNCTURE: CPT

## 2017-08-25 RX ADMIN — IBUPROFEN 600 MG: 600 TABLET, FILM COATED ORAL at 10:42

## 2017-08-25 RX ADMIN — ACETAMINOPHEN 650 MG: 325 TABLET ORAL at 00:31

## 2017-08-25 RX ADMIN — DOCUSATE SODIUM 100 MG: 100 CAPSULE, LIQUID FILLED ORAL at 10:42

## 2017-08-25 RX ADMIN — FERROUS SULFATE TAB 325 MG (65 MG ELEMENTAL FE) 325 MG: 325 (65 FE) TAB at 10:42

## 2017-08-25 RX ADMIN — FAMOTIDINE 20 MG: 20 TABLET ORAL at 10:44

## 2017-08-25 ASSESSMENT — PAIN DESCRIPTION - LOCATION
LOCATION: ABDOMEN;PERINEUM

## 2017-08-25 ASSESSMENT — PAIN DESCRIPTION - ONSET
ONSET: GRADUAL

## 2017-08-25 ASSESSMENT — PAIN DESCRIPTION - PAIN TYPE
TYPE: ACUTE PAIN
TYPE: ACUTE PAIN

## 2017-08-25 ASSESSMENT — PAIN SCALES - GENERAL
PAINLEVEL_OUTOF10: 6
PAINLEVEL_OUTOF10: 4
PAINLEVEL_OUTOF10: 2
PAINLEVEL_OUTOF10: 6
PAINLEVEL_OUTOF10: 2
PAINLEVEL_OUTOF10: 2

## 2017-08-25 ASSESSMENT — PAIN DESCRIPTION - ORIENTATION
ORIENTATION: LOWER

## 2017-08-25 ASSESSMENT — PAIN DESCRIPTION - PROGRESSION
CLINICAL_PROGRESSION: NOT CHANGED
CLINICAL_PROGRESSION: GRADUALLY IMPROVING
CLINICAL_PROGRESSION: NOT CHANGED
CLINICAL_PROGRESSION: GRADUALLY IMPROVING

## 2017-08-25 ASSESSMENT — PAIN DESCRIPTION - FREQUENCY
FREQUENCY: INTERMITTENT

## 2017-08-25 ASSESSMENT — PAIN DESCRIPTION - DESCRIPTORS
DESCRIPTORS: CRAMPING

## 2017-08-26 ENCOUNTER — HOSPITAL ENCOUNTER (EMERGENCY)
Age: 22
Discharge: HOME | End: 2017-08-26
Attending: EMERGENCY MEDICINE
Payer: COMMERCIAL

## 2017-08-26 VITALS
TEMPERATURE: 98.1 F | SYSTOLIC BLOOD PRESSURE: 143 MMHG | OXYGEN SATURATION: 98 % | RESPIRATION RATE: 20 BRPM | BODY MASS INDEX: 40.92 KG/M2 | DIASTOLIC BLOOD PRESSURE: 99 MMHG | WEIGHT: 270 LBS | HEART RATE: 98 BPM | HEIGHT: 68 IN

## 2017-08-26 DIAGNOSIS — N93.9 VAGINAL BLEEDING: Primary | ICD-10-CM

## 2017-08-26 PROCEDURE — 99283 EMERGENCY DEPT VISIT LOW MDM: CPT

## 2017-08-26 ASSESSMENT — ENCOUNTER SYMPTOMS
VOMITING: 0
NAUSEA: 0
SHORTNESS OF BREATH: 0
DIARRHEA: 0
COUGH: 0
ABDOMINAL PAIN: 0

## 2017-10-09 ENCOUNTER — OFFICE VISIT (OUTPATIENT)
Dept: OBGYN | Age: 22
End: 2017-10-09

## 2017-10-09 VITALS
HEART RATE: 88 BPM | SYSTOLIC BLOOD PRESSURE: 110 MMHG | BODY MASS INDEX: 40.16 KG/M2 | WEIGHT: 265 LBS | HEIGHT: 68 IN | DIASTOLIC BLOOD PRESSURE: 66 MMHG

## 2017-10-09 DIAGNOSIS — O24.410 DIET CONTROLLED GESTATIONAL DIABETES MELLITUS (GDM), ANTEPARTUM: ICD-10-CM

## 2017-10-09 PROCEDURE — 0503F POSTPARTUM CARE VISIT: CPT | Performed by: OBSTETRICS & GYNECOLOGY

## 2017-10-09 RX ORDER — NORGESTIMATE AND ETHINYL ESTRADIOL 0.25-0.035
1 KIT ORAL DAILY
Qty: 1 PACKET | Refills: 11 | Status: SHIPPED | OUTPATIENT
Start: 2017-10-09 | End: 2021-05-17

## 2017-10-25 PROBLEM — O24.410 DIET CONTROLLED GESTATIONAL DIABETES MELLITUS (GDM), ANTEPARTUM: Status: ACTIVE | Noted: 2017-10-25

## 2017-10-25 NOTE — PROGRESS NOTES
Post Partum     Irish Aleman is a 23 y.o. female     The patient was seen. She does not have a chief complaints today. She delivered vaginally on 6 weeks ago. She is not breast feeding and there is not any signs or symptoms of mastitis. The patient completed the E.P.D.S. Evaluation form and scored 2. She does not have any signs or symptoms of post partum depression. She denies any suicidal thoughts with a plan, intent to harm others and delusional ideas. Today her lochia is light she denies any dizziness or shortness of breath. Her pregnancy was complicated by gestational diabetes. She does admit to having good home support. Vitals:  /66 (Site: Left Arm, Position: Sitting, Cuff Size: Large Adult)   Pulse 88   Ht 5' 8\" (1.727 m)   Wt 265 lb (120.2 kg)   LMP 10/02/2017 (Approximate)   Breastfeeding? Unknown   BMI 40.29 kg/m²   Allergies:  Tomato  Past Medical History:   Diagnosis Date    Diabetes mellitus (HCC)     type 2, checking sugars 5 times per day one hour glucose test was normal     Past Surgical History:   Procedure Laterality Date    TONSILLECTOMY       OB History      Para Term  AB Living    1              SAB TAB Ectopic Molar Multiple Live Births                       Family History   Problem Relation Age of Onset    Asthma Mother     Diabetes Father     Birth Defects Sister     Mental Retardation Sister     Early Death Sister     Diabetes Maternal Grandmother      Social History     Social History    Marital status: Single     Spouse name: N/A    Number of children: N/A    Years of education: N/A     Occupational History    Not on file.      Social History Main Topics    Smoking status: Never Smoker    Smokeless tobacco: Not on file    Alcohol use No    Drug use: No    Sexual activity: Yes     Partners: Male     Other Topics Concern    Not on file     Social History Narrative    No narrative on file       Contraceptive method:  none    Review counseling completed       Laya Cain M.D., [email or web address]. G

## 2017-10-26 ASSESSMENT — ENCOUNTER SYMPTOMS
VOMITING: 0
NAUSEA: 0
SHORTNESS OF BREATH: 0
COUGH: 0

## 2020-03-31 ENCOUNTER — OFFICE VISIT (OUTPATIENT)
Dept: OBGYN CLINIC | Age: 25
End: 2020-03-31
Payer: COMMERCIAL

## 2020-03-31 VITALS
SYSTOLIC BLOOD PRESSURE: 112 MMHG | WEIGHT: 268 LBS | BODY MASS INDEX: 40.62 KG/M2 | HEIGHT: 68 IN | DIASTOLIC BLOOD PRESSURE: 74 MMHG | HEART RATE: 64 BPM

## 2020-03-31 PROCEDURE — G8484 FLU IMMUNIZE NO ADMIN: HCPCS | Performed by: OBSTETRICS & GYNECOLOGY

## 2020-03-31 PROCEDURE — 99395 PREV VISIT EST AGE 18-39: CPT | Performed by: OBSTETRICS & GYNECOLOGY

## 2020-03-31 NOTE — PROGRESS NOTES
Subjective: Moe Pop is a 23 y. o.female  here for routine exam.  Current Complaints: normal menses, no abnormal bleeding, pelvic pain or discharge, no breast pain or new or enlarging lumps on self exam.    Menstrual history:   regular menses 30 days  Sexual activity:  yes, denies knowledge of risky exposure  Abnormal vaginaldischarge:  No  Contraceptive method:  none    Vitals:  /74 (Site: Right Upper Arm, Position: Sitting, Cuff Size: Large Adult)   Pulse 64   Ht 5' 8\" (1.727 m)   Wt 268 lb (121.6 kg)   LMP 2020 (Approximate)   BMI 40.75 kg/m²   Allergies:  Tomato     Past Medical History:   Diagnosis Date    Diabetes mellitus (HCC)     type 2, checking sugars 5 times per day one hour glucose test was normal     Past Surgical History:   Procedure Laterality Date    TONSILLECTOMY       Family History   Problem Relation Age of Onset    Asthma Mother     Diabetes Father     Birth Defects Sister     Mental Retardation Sister     Early Death Sister     Diabetes Maternal Grandmother      Social History     Socioeconomic History    Marital status: Single     Spouse name: Not on file    Number of children: Not on file    Years of education: Not on file    Highest education level: Not on file   Occupational History    Not on file   Social Needs    Financial resource strain: Not on file    Food insecurity     Worry: Not on file     Inability: Not on file    Transportation needs     Medical: Not on file     Non-medical: Not on file   Tobacco Use    Smoking status: Never Smoker    Smokeless tobacco: Never Used   Substance and Sexual Activity    Alcohol use: No    Drug use: No    Sexual activity: Yes     Partners: Male   Lifestyle    Physical activity     Days per week: Not on file     Minutes per session: Not on file    Stress: Not on file   Relationships    Social connections     Talks on phone: Not on file     Gets together: Not on file     Attends Yarsanism service: Not on file     Active member of club or organization: Not on file     Attends meetings of clubs or organizations: Not on file     Relationship status: Not on file    Intimate partner violence     Fear of current or ex partner: Not on file     Emotionally abused: Not on file     Physically abused: Not on file     Forced sexual activity: Not on file   Other Topics Concern    Not on file   Social History Narrative    Not on file       Gynecologic History  Patient's last menstrual period was 2020 (approximate). Last Pap: 1 yr ago  Results: normal  Last Mammogram: Not Indicated Results: N/A  FH breast cancer : negative     OB History        1    Para        Term                AB        Living           SAB        TAB        Ectopic        Molar        Multiple        Live Births                  Patient's medications, allergies, past medical, surgical, social and family histories were reviewed and updated as appropriate. Review of Systems  Review of Systems  Review of Systems   Constitutional: Negative for chills and fever. Respiratory: Negative for cough and shortnessof breath. Cardiovascular: Negative for chest pain and palpitations. Gastrointestinal: Negative for nausea and vomiting. Genitourinary: Negative for dysuria,frequency and urgency. Musculoskeletal: Negative for myalgias. Neurological: Negative for dizziness, seizures and headaches. Psychiatric/Behavioral: Negative for depression and suicidal ideas. All other systemsreviewed and are negative. Objective:     Vitals:  /74 (Site: Right Upper Arm, Position: Sitting, Cuff Size: Large Adult)   Pulse 64   Ht 5' 8\" (1.727 m)   Wt 268 lb (121.6 kg)   LMP 2020 (Approximate)   BMI 40.75 kg/m²     Physical Exam  Physical Exam  Physical Exam   Constitutional: She is oriented to person,place, and time and well-developed, well-nourished, and in no distress.    HENT:   Head: Normocephalic and Specific Question:   Collection Type     Answer: Thin Prep     Order Specific Question:   Prior Abnormal Pap Test     Answer:   No     Order Specific Question:   Screening or Diagnostic     Answer:   Screening     Order Specific Question:   HPV Requested? Answer:   Yes     Order Specific Question:   High Risk Patient     Answer:   N/A    Glucose Tolerance, 2 Hours     Standing Status:   Future     Standing Expiration Date:   3/31/2021    Comprehensive Metabolic Panel     Standing Status:   Future     Standing Expiration Date:   3/31/2021    CBC Auto Differential     Standing Status:   Future     Standing Expiration Date:   3/31/2021       No orders of the defined types were placed in this encounter. Follow up:  Return in about 1 year (around 3/31/2021) for next annual exam visit.       Glenn Ramires MD

## 2020-04-03 LAB
CHLAMYDIA TRACHOMATIS AMPLIFIED DET: NEGATIVE
N GONORRHOEAE AMPLIFIED DET: NEGATIVE
PAP SMEAR: NORMAL

## 2020-04-08 DIAGNOSIS — Z01.419 VISIT FOR GYNECOLOGIC EXAMINATION: ICD-10-CM

## 2020-04-08 DIAGNOSIS — E74.39 GLUCOSE INTOLERANCE: ICD-10-CM

## 2020-04-08 LAB
ALBUMIN SERPL-MCNC: 4 G/DL (ref 3.5–4.6)
ALP BLD-CCNC: 104 U/L (ref 40–130)
ALT SERPL-CCNC: 17 U/L (ref 0–33)
ANION GAP SERPL CALCULATED.3IONS-SCNC: 14 MEQ/L (ref 9–15)
AST SERPL-CCNC: 9 U/L (ref 0–35)
BASOPHILS ABSOLUTE: 0 K/UL (ref 0–0.2)
BASOPHILS RELATIVE PERCENT: 0.3 %
BILIRUB SERPL-MCNC: 0.3 MG/DL (ref 0.2–0.7)
BUN BLDV-MCNC: 8 MG/DL (ref 6–20)
CALCIUM SERPL-MCNC: 9.3 MG/DL (ref 8.5–9.9)
CHLORIDE BLD-SCNC: 101 MEQ/L (ref 95–107)
CO2: 22 MEQ/L (ref 20–31)
CREAT SERPL-MCNC: 0.59 MG/DL (ref 0.5–0.9)
EOSINOPHILS ABSOLUTE: 0.1 K/UL (ref 0–0.7)
EOSINOPHILS RELATIVE PERCENT: 0.9 %
GFR AFRICAN AMERICAN: >60
GFR NON-AFRICAN AMERICAN: >60
GLOBULIN: 3.2 G/DL (ref 2.3–3.5)
GLUCOSE BLD-MCNC: 86 MG/DL (ref 70–99)
GLUCOSE FASTING: 84 MG/DL
GLUCOSE TOLERANCE TEST 1 HOUR: 119 MG/DL
GLUCOSE TOLERANCE TEST 2 HOUR: 140 MG/DL
HCT VFR BLD CALC: 39.4 % (ref 37–47)
HEMOGLOBIN: 13 G/DL (ref 12–16)
LYMPHOCYTES ABSOLUTE: 1.7 K/UL (ref 1–4.8)
LYMPHOCYTES RELATIVE PERCENT: 16.5 %
MCH RBC QN AUTO: 29.3 PG (ref 27–31.3)
MCHC RBC AUTO-ENTMCNC: 33 % (ref 33–37)
MCV RBC AUTO: 88.7 FL (ref 82–100)
MONOCYTES ABSOLUTE: 0.6 K/UL (ref 0.2–0.8)
MONOCYTES RELATIVE PERCENT: 6.2 %
NEUTROPHILS ABSOLUTE: 7.7 K/UL (ref 1.4–6.5)
NEUTROPHILS RELATIVE PERCENT: 76.1 %
PDW BLD-RTO: 13.8 % (ref 11.5–14.5)
PLATELET # BLD: 384 K/UL (ref 130–400)
POTASSIUM SERPL-SCNC: 3.9 MEQ/L (ref 3.4–4.9)
RBC # BLD: 4.44 M/UL (ref 4.2–5.4)
SODIUM BLD-SCNC: 137 MEQ/L (ref 135–144)
TOTAL PROTEIN: 7.2 G/DL (ref 6.3–8)
WBC # BLD: 10.1 K/UL (ref 4.8–10.8)

## 2021-05-17 ENCOUNTER — HOSPITAL ENCOUNTER (OUTPATIENT)
Dept: LAB | Age: 26
Discharge: HOME | End: 2021-05-17
Payer: COMMERCIAL

## 2021-05-17 ENCOUNTER — OFFICE VISIT (OUTPATIENT)
Dept: FAMILY MEDICINE CLINIC | Age: 26
End: 2021-05-17
Payer: COMMERCIAL

## 2021-05-17 VITALS
HEIGHT: 66 IN | TEMPERATURE: 98.4 F | SYSTOLIC BLOOD PRESSURE: 108 MMHG | OXYGEN SATURATION: 97 % | HEART RATE: 81 BPM | DIASTOLIC BLOOD PRESSURE: 78 MMHG | WEIGHT: 278.6 LBS | BODY MASS INDEX: 44.77 KG/M2

## 2021-05-17 DIAGNOSIS — G47.10 HYPERSOMNIA: ICD-10-CM

## 2021-05-17 DIAGNOSIS — E11.9 DIET-CONTROLLED TYPE 2 DIABETES MELLITUS (HCC): ICD-10-CM

## 2021-05-17 DIAGNOSIS — M72.2 PLANTAR FASCIITIS, BILATERAL: ICD-10-CM

## 2021-05-17 DIAGNOSIS — Z23 NEED FOR VACCINATION: ICD-10-CM

## 2021-05-17 DIAGNOSIS — R53.83 FATIGUE, UNSPECIFIED TYPE: ICD-10-CM

## 2021-05-17 DIAGNOSIS — E01.0 THYROMEGALY: ICD-10-CM

## 2021-05-17 DIAGNOSIS — E66.01 MORBID OBESITY (HCC): ICD-10-CM

## 2021-05-17 DIAGNOSIS — E11.9 DIET-CONTROLLED TYPE 2 DIABETES MELLITUS (HCC): Primary | ICD-10-CM

## 2021-05-17 PROBLEM — O24.410 DIET CONTROLLED GESTATIONAL DIABETES MELLITUS (GDM), ANTEPARTUM: Status: RESOLVED | Noted: 2017-10-25 | Resolved: 2021-05-17

## 2021-05-17 PROBLEM — O21.0 HYPEREMESIS COMPLICATING PREGNANCY, ANTEPARTUM: Status: RESOLVED | Noted: 2017-01-20 | Resolved: 2021-05-17

## 2021-05-17 LAB
ALBUMIN SERPL-MCNC: 4.1 G/DL (ref 3.5–4.6)
ALP BLD-CCNC: 103 U/L (ref 40–130)
ALT SERPL-CCNC: 14 U/L (ref 0–33)
ANION GAP SERPL CALCULATED.3IONS-SCNC: 11 MEQ/L (ref 9–15)
AST SERPL-CCNC: 9 U/L (ref 0–35)
BASOPHILS ABSOLUTE: 0 K/UL (ref 0–0.2)
BASOPHILS RELATIVE PERCENT: 0.5 %
BILIRUB SERPL-MCNC: 0.4 MG/DL (ref 0.2–0.7)
BUN BLDV-MCNC: 8 MG/DL (ref 6–20)
CALCIUM SERPL-MCNC: 10 MG/DL (ref 8.5–9.9)
CHLORIDE BLD-SCNC: 102 MEQ/L (ref 95–107)
CHOLESTEROL, TOTAL: 101 MG/DL (ref 0–199)
CO2: 25 MEQ/L (ref 20–31)
CREAT SERPL-MCNC: 0.68 MG/DL (ref 0.5–0.9)
CREATININE URINE: 117.4 MG/DL
EOSINOPHILS ABSOLUTE: 0.2 K/UL (ref 0–0.7)
EOSINOPHILS RELATIVE PERCENT: 1.8 %
GFR AFRICAN AMERICAN: >60
GFR NON-AFRICAN AMERICAN: >60
GLOBULIN: 2.6 G/DL (ref 2.3–3.5)
GLUCOSE BLD-MCNC: 89 MG/DL (ref 70–99)
HBA1C MFR BLD: 6 %
HCT VFR BLD CALC: 36.5 % (ref 37–47)
HDLC SERPL-MCNC: 38 MG/DL (ref 40–59)
HEMOGLOBIN: 12.2 G/DL (ref 12–16)
LDL CHOLESTEROL CALCULATED: 48 MG/DL (ref 0–129)
LYMPHOCYTES ABSOLUTE: 1.8 K/UL (ref 1–4.8)
LYMPHOCYTES RELATIVE PERCENT: 19.8 %
MCH RBC QN AUTO: 28.9 PG (ref 27–31.3)
MCHC RBC AUTO-ENTMCNC: 33.5 % (ref 33–37)
MCV RBC AUTO: 86.3 FL (ref 82–100)
MICROALBUMIN UR-MCNC: <1.2 MG/DL
MICROALBUMIN/CREAT UR-RTO: NORMAL MG/G (ref 0–30)
MONOCYTES ABSOLUTE: 0.5 K/UL (ref 0.2–0.8)
MONOCYTES RELATIVE PERCENT: 6.1 %
NEUTROPHILS ABSOLUTE: 6.4 K/UL (ref 1.4–6.5)
NEUTROPHILS RELATIVE PERCENT: 71.8 %
PDW BLD-RTO: 14.2 % (ref 11.5–14.5)
PLATELET # BLD: 384 K/UL (ref 130–400)
POTASSIUM SERPL-SCNC: 4.7 MEQ/L (ref 3.4–4.9)
RBC # BLD: 4.23 M/UL (ref 4.2–5.4)
SODIUM BLD-SCNC: 138 MEQ/L (ref 135–144)
T4 FREE: 1.38 NG/DL (ref 0.84–1.68)
TOTAL PROTEIN: 6.7 G/DL (ref 6.3–8)
TRIGL SERPL-MCNC: 74 MG/DL (ref 0–150)
TSH SERPL DL<=0.05 MIU/L-ACNC: 0.88 UIU/ML (ref 0.44–3.86)
VITAMIN D 25-HYDROXY: 18.1 NG/ML (ref 30–100)
WBC # BLD: 8.9 K/UL (ref 4.8–10.8)

## 2021-05-17 PROCEDURE — 90472 IMMUNIZATION ADMIN EACH ADD: CPT | Performed by: FAMILY MEDICINE

## 2021-05-17 PROCEDURE — 90715 TDAP VACCINE 7 YRS/> IM: CPT | Performed by: FAMILY MEDICINE

## 2021-05-17 PROCEDURE — 80053 COMPREHEN METABOLIC PANEL: CPT

## 2021-05-17 PROCEDURE — 84439 ASSAY OF FREE THYROXINE: CPT

## 2021-05-17 PROCEDURE — 90746 HEPB VACCINE 3 DOSE ADULT IM: CPT | Performed by: FAMILY MEDICINE

## 2021-05-17 PROCEDURE — 85025 COMPLETE CBC W/AUTO DIFF WBC: CPT

## 2021-05-17 PROCEDURE — 90732 PPSV23 VACC 2 YRS+ SUBQ/IM: CPT | Performed by: FAMILY MEDICINE

## 2021-05-17 PROCEDURE — 82306 VITAMIN D 25 HYDROXY: CPT

## 2021-05-17 PROCEDURE — 90471 IMMUNIZATION ADMIN: CPT | Performed by: FAMILY MEDICINE

## 2021-05-17 PROCEDURE — 84443 ASSAY THYROID STIM HORMONE: CPT

## 2021-05-17 PROCEDURE — 83036 HEMOGLOBIN GLYCOSYLATED A1C: CPT | Performed by: FAMILY MEDICINE

## 2021-05-17 PROCEDURE — 36415 COLL VENOUS BLD VENIPUNCTURE: CPT

## 2021-05-17 PROCEDURE — 82043 UR ALBUMIN QUANTITATIVE: CPT

## 2021-05-17 PROCEDURE — 80061 LIPID PANEL: CPT

## 2021-05-17 PROCEDURE — 82570 ASSAY OF URINE CREATININE: CPT

## 2021-05-17 PROCEDURE — 99204 OFFICE O/P NEW MOD 45 MIN: CPT | Performed by: FAMILY MEDICINE

## 2021-05-17 RX ORDER — LANCETS 30 GAUGE
EACH MISCELLANEOUS
Qty: 100 EACH | Refills: 5 | Status: SHIPPED | OUTPATIENT
Start: 2021-05-17 | End: 2022-10-26

## 2021-05-17 RX ORDER — BLOOD PRESSURE TEST KIT
KIT MISCELLANEOUS
Qty: 100 EACH | Refills: 5 | Status: SHIPPED | OUTPATIENT
Start: 2021-05-17 | End: 2022-10-26

## 2021-05-17 RX ORDER — GLUCOSAMINE HCL/CHONDROITIN SU 500-400 MG
CAPSULE ORAL
Qty: 100 STRIP | Refills: 5 | Status: SHIPPED | OUTPATIENT
Start: 2021-05-17 | End: 2022-10-26

## 2021-05-17 SDOH — ECONOMIC STABILITY: FOOD INSECURITY: WITHIN THE PAST 12 MONTHS, YOU WORRIED THAT YOUR FOOD WOULD RUN OUT BEFORE YOU GOT MONEY TO BUY MORE.: PATIENT DECLINED

## 2021-05-17 ASSESSMENT — ENCOUNTER SYMPTOMS
BLOOD IN STOOL: 0
CONSTIPATION: 0
ABDOMINAL DISTENTION: 0
WHEEZING: 0
ABDOMINAL PAIN: 0
BACK PAIN: 0
CHEST TIGHTNESS: 0
SHORTNESS OF BREATH: 0
NAUSEA: 0
COUGH: 0
ANAL BLEEDING: 0
DIARRHEA: 0
VOMITING: 0

## 2021-05-17 ASSESSMENT — PATIENT HEALTH QUESTIONNAIRE - PHQ9
2. FEELING DOWN, DEPRESSED OR HOPELESS: 0
SUM OF ALL RESPONSES TO PHQ9 QUESTIONS 1 & 2: 0
SUM OF ALL RESPONSES TO PHQ QUESTIONS 1-9: 0
1. LITTLE INTEREST OR PLEASURE IN DOING THINGS: 0
SUM OF ALL RESPONSES TO PHQ QUESTIONS 1-9: 0

## 2021-05-17 NOTE — PROGRESS NOTES
Seymour Goodman (: 1995) is a 27 y.o. female, Established patient, who presents today for:    Chief Complaint   Patient presents with    Foot Swelling     Pt states that swelling in the feet has been going on for about 1 year. pt states that she has some stabbing/aching in feet when standing or walking     Weight Gain     Pt states that over the past 4 years she has noticed alot of weight gain even tho she is \"very active \"     Headache     Pt states that she has been having headaches on the left side of her head. Pt states that headaches stated 1 month ago     New Patient     Patient presents today to establish care. ASSESSMENT/PLAN    1. Diet-controlled type 2 diabetes mellitus ([facility] [address]  Assessment & Plan:  Stable. A1c at goal control today in the office. Patient was encouraged to continue with healthy lifestyle efforts. We will continue to follow lab work over time. Orders:  -     POCT glycosylated hemoglobin (Hb A1C)  -     blood glucose monitor kit and supplies; DX: diabetes mellitus. Test 1 time(s) daily - Ok to substitute per insurance, Disp-1 kit, R-0, Normal  -     blood glucose monitor strips; DX: diabetes mellitus. Use 1 time(s) daily - Ok to substitute per insurance, Disp-100 strip, R-5, Normal  -     Lancets MISC; Disp-100 each, R-5, NormalDX: diabetes mellitus. Use 1 time(s) daily - Ok to substitute per insurance (1 each = 1 box)  -     Alcohol Swabs PADS; Disp-100 each, R-5, NormalDX: diabetes mellitus. Test 1 time(s) daily - Ok to substitute per insurance (1 each = 1 box)  -     Comprehensive Metabolic Panel; Future  -     Lipid Panel; Future  -     Microalbumin / Creatinine Urine Ratio; Future  -      DIABETES FOOT EXAM  2. Fatigue, unspecified type  Comments:  Unclear etiology. Will obtain lab work and imaging to further evaluate. Orders:  -     CBC Auto Differential; Future  -     Comprehensive Metabolic Panel; Future  -     Vitamin D 25 Hydroxy;  Future  -     TSH without Reflex; Future  -     T4, Free; Future  3. Hypersomnia  Comments: Will obtain sleep study to rule out sleep apnea  Orders:  -     Baseline Diagnostic Sleep Study; Future  4. Plantar fasciitis, bilateral  Comments:  Patient managed with home exercises and ice. If no improvement over the course the next 2 weeks the neck step would be formal physical therapy  Orders:  -     [address]  5. Thyromegaly  -     TSH without Reflex; Future  -     T4, Free; Future  -     US HEAD NECK SOFT TISSUE THYROID; Future  6. Morbid obesity ([address]  Comments:  Patient encouraged to continue with healthy lifestyle and routine exercise. We will await lab work results for any further recommendations  7. Need for vaccination  -     PNEUMOVAX 23 subcutaneous/IM (Pneumococcal polysaccharide vaccine 23-valent >= 3yo)  -     Tdap (age 6y and older) IM (Boostrix)  -     Hep B Vaccine Adult (ENGERIX-B)      Return in about 3 months (around 8/17/2021) for Chronic Disease Check. SUBJECTIVE/OBJECTIVE:    HPI    Patient presents to establish care. She was previously seen by Dr. Susannah Beltran, most recent visit was over 6 months ago. She reports eating a healthy pesceterian diet at home and exercising regularly and still gaining weight over the past 1-2 years. She denies any change in appetite, but reports persistent fatigue, increased hair loss, and temperature intolerance with rooms usually feeling too cold. She reports seeing a dietician and following meal plan outlined with the nutritionist with no benefit. She reports loud snoring at night and reports not feeling rested in the AM when waking up. She frequently feels the need to take naps during the day and reports AM headaches. She reports feeling fatigue during the day as a result of not getting restful sleep.     Patient reports bilateral foot/heel pain over the past year, usually noted to be worse in the AM when taking her first steps or when she has been seated for 10 to 15 minutes and then gets up to walk. She reports heel/foot pain improved with prolonged walking. She reports mild swelling of the feet and ankles, but denies any preceding injury or change to her activity tolerance. There is no reported chest pain, dyspnea, palpitations, lightheadedness, dizziness, or syncope. Patient reports a known history of diabetes managed with healthy lifestyle. She reports making efforts to eat a lower carbohydrate diet, but denies checking blood glucose values at home due to not having a working glucometer. She denies any polyuria or polydipsia, new onset vision changes, or new onset paresthesias. No current outpatient medications on file prior to visit.      Current Facility-Administered Medications on File Prior to Visit   Medication Dose Route Frequency Provider Last Rate Last Admin    lactated ringers infusion   Intravenous Continuous Nadege Castro MD        sodium chloride flush 0.9 % injection 10 mL  10 mL Intravenous 2 times per day Tavon Pederson MD        sodium chloride flush 0.9 % injection 10 mL  10 mL Intravenous PRN Tavon Pederson MD        nalbuphine (NUBAIN) injection 10 mg  10 mg Intravenous Q2H PRN Tavon Pederson MD        oxytocin (PITOCIN) 30 units in 500 mL infusion  1 israel-units/min Intravenous Continuous Nadege Castro MD        simethicone (MYLICON) chewable tablet 80 mg  80 mg Oral Q6H PRN Tavon Pederson MD        docusate sodium (COLACE) capsule 100 mg  100 mg Oral BID Tavon Pederson MD        magnesium hydroxide (MILK OF MAGNESIA) 400 MG/5ML suspension 30 mL  30 mL Oral Daily PRN Tavon Pederson MD        bisacodyl (DULCOLAX) suppository 10 mg  10 mg Rectal Daily PRN Tavon Pederson MD        ondansetron (ZOFRAN) injection 4 mg  4 mg Intravenous Q6H PRN Nadege Castro MD        famotidine (PEPCID) injection 20 mg  20 mg Intravenous BID Tavon Pederson MD        oxytocin (PITOCIN) 30 units in 500 mL infusion  1 israel-units/min Intravenous Continuous PRN Yahir Wheeler MD           Allergies   Allergen Reactions    Tomato Rash        Review of Systems   Constitutional: Positive for fatigue and unexpected weight change (weight gain, gradual over the past 1-2 years). Negative for appetite change, chills, diaphoresis and fever. Eyes: Negative for visual disturbance. Respiratory: Negative for cough, chest tightness, shortness of breath and wheezing. Cardiovascular: Negative for chest pain, palpitations and leg swelling. No orthopnea, No PND   Gastrointestinal: Negative for abdominal distention, abdominal pain, anal bleeding, blood in stool, constipation, diarrhea, nausea and vomiting. No heartburn, No melena   Endocrine: Positive for cold intolerance. Negative for heat intolerance, polydipsia, polyphagia and polyuria. Genitourinary: Negative for dysuria and hematuria. Musculoskeletal: Negative for back pain, gait problem and myalgias. Skin: Negative for rash. Neurological: Negative for dizziness, syncope, weakness, light-headedness, numbness and headaches. Psychiatric/Behavioral: Negative for dysphoric mood. The patient is not nervous/anxious. Vitals:  /78 (Site: Right Upper Arm, Position: Sitting, Cuff Size: Large Adult)   Pulse 81   Temp 98.4 °F (36.9 °C) (Oral)   Ht 5' 6\" (1.676 m)   Wt 278 lb 9.6 oz (126.4 kg)   LMP 04/17/2021 (Approximate)   SpO2 97%   BMI 44.97 kg/m²     Results for POC orders placed in visit on 05/17/21   POCT glycosylated hemoglobin (Hb A1C)   Result Value Ref Range    Hemoglobin A1C 6.0 %         Physical Exam  Vitals reviewed. Constitutional:       General: She is not in acute distress. Appearance: Normal appearance. She is well-developed. She is obese. She is not diaphoretic. Neck:      Thyroid: Thyromegaly present. No thyroid mass or thyroid tenderness. Vascular: No carotid bruit.    Cardiovascular:      Rate and Rhythm: Normal rate and regular rhythm. Pulses:           Dorsalis pedis pulses are 2+ on the right side and 2+ on the left side. Posterior tibial pulses are 2+ on the right side and 2+ on the left side. Heart sounds: Normal heart sounds. No murmur heard. Pulmonary:      Effort: Pulmonary effort is normal. No respiratory distress. Breath sounds: Normal breath sounds. No wheezing or rales. Abdominal:      General: Bowel sounds are normal. There is no distension. Palpations: Abdomen is soft. Tenderness: There is no abdominal tenderness. There is no guarding or rebound. Musculoskeletal:         General: Normal range of motion. Cervical back: Neck supple. Right foot: Normal range of motion. Swelling (trace to ankle) present. No deformity. Left foot: Normal range of motion. Swelling (trace to ankle) present. No deformity. Feet:    Feet:      Right foot:      Protective Sensation: 10 sites tested. 10 sites sensed. Skin integrity: Callus and dry skin present. No ulcer, blister, skin breakdown, erythema, warmth or fissure. Left foot:      Protective Sensation: 10 sites tested. 10 sites sensed. Skin integrity: Callus and dry skin present. No ulcer, blister, skin breakdown, erythema, warmth or fissure. Comments: Tenderness over insertion of plantar fascia onto heel bilaterally  Skin:     General: Skin is warm. Findings: No rash. Neurological:      Mental Status: She is alert and oriented to person, place, and time. Sensory: Sensation is intact. No sensory deficit. Comments: Foot monofilament testing negative for neuropathy bilaterally     Psychiatric:         Mood and Affect: Mood normal.         Behavior: Behavior normal.         Thought Content: Thought content normal.         Ortho Exam (If Applicable)              An electronic signature was used to authenticate this note.      Maggie Grossman MD

## 2021-05-17 NOTE — PATIENT INSTRUCTIONS
Patient Education        Plantar Fasciitis: Care Instructions  Overview     Plantar fasciitis is pain and inflammation of the plantar fascia, the tissue at the bottom of your foot that connects the heel bone to the toes. The plantar fascia also supports the arch. If you strain the plantar fascia, it can develop small tears and cause heel pain when you stand or walk. Plantar fasciitis can be caused by running or other sports. It also may occur in people who are overweight or who have high arches or flat feet. You may get plantar fasciitis if you walk or stand for long periods, or have a tight Achilles tendon or calf muscles. You can improve your foot pain with rest and other care at home. It might take a few weeks to a few months for your foot to heal completely. Follow-up care is a key part of your treatment and safety. Be sure to make and go to all appointments, and call your doctor if you are having problems. It's also a good idea to know your test results and keep a list of the medicines you take. How can you care for yourself at home? · Rest your feet often. Reduce your activity to a level that lets you avoid pain. If possible, do not run or walk on hard surfaces. · Take pain medicines exactly as directed. ? If the doctor gave you a prescription medicine for pain, take it as prescribed. ? If you are not taking a prescription pain medicine, take an over-the-counter anti-inflammatory medicine for pain and swelling, such as ibuprofen (Advil, Motrin) or naproxen (Aleve). Read and follow all instructions on the label. · Use ice massage to help with pain and swelling. You can use an ice cube or an ice cup several times a day. To make an ice cup, fill a paper cup with water and freeze it. Cut off the top of the cup until a half-inch of ice shows. Hold onto the remaining paper to use the cup. Rub the ice in small circles over the area for 5 to 7 minutes.   · Contrast baths, which alternate hot and cold water, can also help reduce swelling. But because heat alone may make pain and swelling worse, end a contrast bath with a soak in cold water. · Wear a night splint if your doctor suggests it. A night splint holds your foot with the toes pointed up and the foot and ankle at a 90-degree angle. This position gives the bottom of your foot a constant, gentle stretch. · Do simple exercises such as calf stretches and towel stretches 2 to 3 times each day, especially when you first get up in the morning. These can help the plantar fascia become more flexible. They also make the muscles that support your arch stronger. Hold these stretches for 15 to 30 seconds per stretch. Repeat 2 to 4 times. ? Stand about 1 foot from a wall. Place the palms of both hands against the wall at chest level. Lean forward against the wall, keeping one leg with the knee straight and heel on the ground while bending the knee of the other leg.  ? Sit down on the floor or a mat with your feet stretched in front of you. Roll up a towel lengthwise, and loop it over the ball of your foot. Holding the towel at both ends, gently pull the towel toward you to stretch your foot. · Wear shoes with good arch support. Athletic shoes or shoes with a well-cushioned sole are good choices. · Replace athletic shoes regularly. · Try heel cups or shoe inserts (orthotics) to help cushion your heel. You can buy these at many shoe stores. · Put on your shoes as soon as you get out of bed. Going barefoot or wearing slippers may make your pain worse. · Reach and stay at a good weight for your height. This puts less strain on your feet. When should you call for help? Call your doctor now or seek immediate medical care if:    · You have heel pain with fever, redness, or warmth in your heel.     · You cannot put weight on the sore foot.    Watch closely for changes in your health, and be sure to contact your doctor if:    · You have numbness or tingling in your heel.     · Your heel pain lasts more than 2 weeks. Where can you learn more? Go to https://chpepiceweb.[email or web address]. org and sign in to your [employer] account. Enter B414 in the [facility] box to learn more about \"Plantar Fasciitis: Care Instructions. \"     If you do not have an account, please click on the \"Sign Up Now\" link. Current as of: November 16, 2020               Content Version: 12.8  © 2006-2021 Healthwise, [employer]. Care instructions adapted under license by [facility] ([facility]). If you have questions about a medical condition or this instruction, always ask your healthcare professional. [place] [address] any warranty or liability for your use of this information. Patient Education        Plantar Fasciitis: Exercises  Introduction  Here are some examples of exercises for you to try. The exercises may be suggested for a condition or for rehabilitation. Start each exercise slowly. Ease off the exercises if you start to have pain. You will be told when to start these exercises and which ones will work best for you. How to do the exercises  Towel stretch   1. Sit with your legs extended and knees straight. 2. Place a towel around your foot just under the toes. 3. Hold each end of the towel in each hand, with your hands above your knees. 4. Pull back with the towel so that your foot stretches toward you. 5. Hold the position for at least 15 to 30 seconds. 6. Repeat 2 to 4 times a session, up to 5 sessions a day. Calf stretch   This exercise stretches the muscles at the back of the lower leg (the calf) and the Achilles tendon. Do this exercise 3 or 4 times a day, 5 days a week. 1. Stand facing a wall with your hands on the wall at about eye level. Put the leg you want to stretch about a step behind your other leg. 2. Keeping your back heel on the floor, bend your front knee until you feel a stretch in the back leg. 3. Hold the stretch for 15 to 30 seconds.  Repeat 2 to 4 times. Plantar fascia and calf stretch   Stretching the plantar fascia and calf muscles can increase flexibility and decrease heel pain. You can do this exercise several times each day and before and after activity. 1. Stand on a step as shown above. Be sure to hold on to the banister. 2. Slowly let your heels down over the edge of the step as you relax your calf muscles. You should feel a gentle stretch across the bottom of your foot and up the back of your leg to your knee. 3. Hold the stretch about 15 to 30 seconds, and then tighten your calf muscle a little to bring your heel back up to the level of the step. Repeat 2 to 4 times. Towel curls   Make this exercise more challenging by placing a weighted object, such as a soup can, on the other end of the towel. 1. While sitting, place your foot on a towel on the floor and scrunch the towel toward you with your toes. 2. Then, also using your toes, push the towel away from you. [place] pickups   1. Put marbles on the floor next to a cup.  2. Using your toes, try to lift the marbles up from the floor and put them in the cup. Follow-up care is a key part of your treatment and safety. Be sure to make and go to all appointments, and call your doctor if you are having problems. It's also a good idea to know your test results and keep a list of the medicines you take. Where can you learn more? Go to https://37coins.[employer]. org and sign in to your [employer] account. Enter O965 in the [facility] box to learn more about \"Plantar Fasciitis: Exercises. \"     If you do not have an account, please click on the \"Sign Up Now\" link. Current as of: November 16, 2020               Content Version: 12.8  © 8262-6570 Healthwise, Incorporated. Care instructions adapted under license by [facility] ([facility]).  If you have questions about a medical condition or this instruction, always ask your healthcare professional. [employer] disclaims any warranty or liability for your use of this information.

## 2021-05-25 ENCOUNTER — OFFICE VISIT (OUTPATIENT)
Dept: OBGYN CLINIC | Age: 26
End: 2021-05-25
Payer: COMMERCIAL

## 2021-05-25 VITALS
DIASTOLIC BLOOD PRESSURE: 82 MMHG | HEIGHT: 66 IN | HEART RATE: 84 BPM | WEIGHT: 275 LBS | BODY MASS INDEX: 44.2 KG/M2 | SYSTOLIC BLOOD PRESSURE: 112 MMHG

## 2021-05-25 DIAGNOSIS — Z11.3 SCREENING FOR STD (SEXUALLY TRANSMITTED DISEASE): ICD-10-CM

## 2021-05-25 DIAGNOSIS — E55.9 VITAMIN D DEFICIENCY: Primary | ICD-10-CM

## 2021-05-25 DIAGNOSIS — E78.6 LOW HDL (UNDER 40): ICD-10-CM

## 2021-05-25 DIAGNOSIS — Z01.419 VISIT FOR GYNECOLOGIC EXAMINATION: Primary | ICD-10-CM

## 2021-05-25 DIAGNOSIS — Z11.51 SCREENING FOR HPV (HUMAN PAPILLOMAVIRUS): ICD-10-CM

## 2021-05-25 PROCEDURE — 99395 PREV VISIT EST AGE 18-39: CPT | Performed by: OBSTETRICS & GYNECOLOGY

## 2021-05-25 RX ORDER — ERGOCALCIFEROL 1.25 MG/1
50000 CAPSULE ORAL WEEKLY
Qty: 12 CAPSULE | Refills: 0 | Status: SHIPPED | OUTPATIENT
Start: 2021-05-25 | End: 2022-08-06

## 2021-05-25 RX ORDER — ACETAMINOPHEN 160 MG
1 TABLET,DISINTEGRATING ORAL DAILY
Qty: 90 CAPSULE | Refills: 3 | Status: SHIPPED | OUTPATIENT
Start: 2021-05-25 | End: 2022-08-06

## 2021-05-25 NOTE — PROGRESS NOTES
Subjective: Marie Bradshaw is a 27 y. o.female  here for routine exam.  Current Complaints: normal menses, no abnormal bleeding, pelvic pain or discharge, no breast pain or new or enlarging lumps on self exam.    Menstrual history:   regular menses 30 days  Sexual activity:  yes, denies knowledge of risky exposure  Abnormal vaginaldischarge:  No  Contraceptive method:  none    Vitals:  /82 (Site: Right Upper Arm, Position: Sitting, Cuff Size: Large Adult)   Pulse 84   Ht 5' 6\" (1.676 m)   Wt 275 lb (124.7 kg)   LMP 2021 (Approximate)   BMI 44.39 kg/m²   Allergies:  Tomato     Past Medical History:   Diagnosis Date    Diet controlled gestational diabetes mellitus (GDM), antepartum 10/25/2017    Hyperemesis complicating pregnancy, antepartum 2017    Low HDL (under 40) 2021    Morbid obesity ([address] 2021    Type 2 diabetes mellitus in pregnancy      Past Surgical History:   Procedure Laterality Date    TONSILLECTOMY       Family History   Problem Relation Age of Onset    Hypertension Mother     Asthma Mother     High Cholesterol Father     Hypertension Father     Diabetes Father     Birth Defects Sister     Mental Retardation Sister     Early Death Sister     Diabetes Maternal Grandmother     Diabetes Paternal Grandmother     No Known Problems Son      Social History     Socioeconomic History    Marital status: Single     Spouse name: Not on file    Number of children: Not on file    Years of education: Not on file    Highest education level: Not on file   Occupational History    Occupation:    Tobacco Use    Smoking status: Never Smoker    Smokeless tobacco: Never Used   Vaping Use    Vaping Use: Never used   Substance and Sexual Activity    Alcohol use: No    Drug use: No    Sexual activity: Yes     Partners: Male     Comment: hx chlamydia, treated   Other Topics Concern    Not on file   Social History Narrative    Working on criminal justice degree        Lives alone with son        No pets        Hobbies: Personal Care     Social Determinants of Health     Financial Resource Strain: Unknown    Difficulty of Paying Living Expenses: Patient refused   Food Insecurity: Unknown    Worried About Running Out of Food in the Last Year: Patient refused    [address] in the Last Year: Patient refused   Transportation Needs:     Lack of Transportation (Medical):  Lack of Transportation (Non-Medical):    Physical Activity:     Days of Exercise per Week:     Minutes of Exercise per Session:    Stress:     Feeling of Stress :    Social Connections:     Frequency of Communication with Friends and Family:     Frequency of Social Gatherings with Friends and Family:     Attends Buddhist Services:     Active Member of Clubs or Organizations:     Attends Club or Organization Meetings:     Marital Status:    Intimate Partner Violence:     Fear of Current or Ex-Partner:     Emotionally Abused:     Physically Abused:     Sexually Abused:        Gynecologic History  Patient's last menstrual period was 2021 (approximate). Last Pap: 1 yr ago  Results: normal  Last Mammogram: Not Indicated Results: N/A  FH breast cancer : negative     OB History        1    Para        Term                AB        Living           SAB        TAB        Ectopic        Molar        Multiple        Live Births                  Patient's medications, allergies, past medical, surgical, social and family histories were reviewed and updated as appropriate. Review of Systems  Review of Systems  Review of Systems   Constitutional: Negative for chills and fever. Respiratory: Negative for cough and shortnessof breath. Cardiovascular: Negative for chest pain and palpitations. Gastrointestinal: Negative for nausea and vomiting. Genitourinary: Negative for dysuria,frequency and urgency. Musculoskeletal: Negative for myalgias. Neurological: Negative for dizziness, seizures and headaches. Psychiatric/Behavioral: Negative for depression and suicidal ideas. All other systemsreviewed and are negative. Objective:     Vitals:  /82 (Site: Right Upper Arm, Position: Sitting, Cuff Size: Large Adult)   Pulse 84   Ht 5' 6\" (1.676 m)   Wt 275 lb (124.7 kg)   LMP 05/04/2021 (Approximate)   BMI 44.39 kg/m²     Physical Exam  Physical Exam  Physical Exam   Constitutional: She is oriented to person,place, and time and well-developed, well-nourished, and in no distress. HENT:   Head: Normocephalic and atraumatic. Eyes: Conjunctivae are normal. Pupils are equal, round, andreactive to light. Neck: Normal range of motion. Neck supple. Cardiovascular: Normal rate and regular rhythm. Pulmonary/Chest: Effort normal and breath soundsnormal. No respiratory distress. Right breast exhibits no inverted nipple, no mass, no nipple discharge, no skin change and no tenderness. Left breast exhibits no inverted nipple, no mass, no nipple discharge, no skin changeand no tenderness. Breasts are symmetrical.   Abdominal: Soft. Bowel sounds are normal.   Genitourinary: Vagina normal, uterus normal and cervix normal. No vaginal discharge found. Musculoskeletal: Normal range of motion. Neurological: She is alert and oriented to person, place, and time. She has normal reflexes. Psychiatric: Memory, affect and judgment normal.       Assessment:      Diagnosis Orders   1. Visit for gynecologic examination  PAP SMEAR    C.trachomatis N.gonorrhoeae DNA    Trichomonas Screen    C.trachomatis N.gonorrhoeae DNA    PAP SMEAR   2. Screening for HPV (human papillomavirus)  PAP SMEAR    PAP SMEAR   3. Screening for STD (sexually transmitted disease)  C.trachomatis N.gonorrhoeae DNA    Trichomonas Screen    C.trachomatis N.gonorrhoeae DNA       Body mass index is 44.39 kg/m². Obesity:  Class II  Smoking:  No    Plan:   Pap smear : indicated:  performed. Breast exam : Normal  STD work up : Indicated      Obesity Counseling:  Given  Smoking Counseling:  N/A  STD counseling: Discussed safe sexual practice in detail    Orders Placed This Encounter   Procedures    C.trachomatis N.gonorrhoeae DNA     Standing Status:   Future     Number of Occurrences:   1     Standing Expiration Date:   5/24/2022    Trichomonas Screen     Standing Status:   Future     Standing Expiration Date:   5/24/2022    PAP SMEAR     Standing Status:   Future     Number of Occurrences:   1     Standing Expiration Date:   5/24/2022     Order Specific Question:   Collection Type     Answer: Thin Prep     Order Specific Question:   Prior Abnormal Pap Test     Answer:   No     Order Specific Question:   Screening or Diagnostic     Answer:   Screening     Order Specific Question:   HPV Requested? Answer:   Yes     Order Specific Question:   High Risk Patient     Answer:   N/A       No orders of the defined types were placed in this encounter. Follow up:  Return in about 1 year (around 5/25/2022) for next annual exam visit.       Vitor Kong MD

## 2021-05-25 NOTE — RESULT ENCOUNTER NOTE
Please call patient to notify her that recent lab work shows the followin. Her blood counts are normal, her liver function testing is normal, her kidney function testing is normal with normal urine microalbumin, and her thyroid testing is normal2. Her cholesterol testing shows a low good cholesterol level but bad cholesterol at goal control3. Her Vitamin D level is low. I would like patient to start a high dose vitamin D supplement 50,000 IU ONCE A WEEK for the next 12 weeks and have repeat vitamin D level drawn once prescription is completed to re-check value. Future lab ordered and prescription sent to pharmacy. Patient should also start a daily vitamin D3 supplement at 2,000 IU daily. I have also sent this prescription to the pharmacy.

## 2021-05-29 LAB
CHLAMYDIA TRACHOMATIS AMPLIFIED DET: NEGATIVE
N GONORRHOEAE AMPLIFIED DET: NEGATIVE
PAP SMEAR: ABNORMAL

## 2021-06-01 ENCOUNTER — HOSPITAL ENCOUNTER (OUTPATIENT)
Dept: ULTRASOUND IMAGING | Age: 26
Discharge: HOME | End: 2021-06-03
Payer: COMMERCIAL

## 2021-06-01 DIAGNOSIS — E01.0 THYROMEGALY: ICD-10-CM

## 2021-06-01 PROCEDURE — 76536 US EXAM OF HEAD AND NECK: CPT

## 2021-06-07 NOTE — RESULT ENCOUNTER NOTE
Please notify patient that recent thyroid ultrasound shows the thyroid gland to be on the larger end of normal, but still normal with no suspicious nodules or masses.   No further testing is needed

## 2021-06-14 ENCOUNTER — TELEPHONE (OUTPATIENT)
Dept: OBGYN CLINIC | Age: 26
End: 2021-06-14

## 2021-06-14 NOTE — TELEPHONE ENCOUNTER
Called and spoke with patient. She had questions regarding HPV. All questions were answered and she was made aware to call the office if she thinks of any more questions.

## 2021-06-29 ENCOUNTER — OFFICE VISIT (OUTPATIENT)
Dept: FAMILY MEDICINE CLINIC | Age: 26
End: 2021-06-29
Payer: COMMERCIAL

## 2021-06-29 VITALS
DIASTOLIC BLOOD PRESSURE: 78 MMHG | TEMPERATURE: 97.8 F | OXYGEN SATURATION: 98 % | WEIGHT: 276 LBS | SYSTOLIC BLOOD PRESSURE: 118 MMHG | BODY MASS INDEX: 41.83 KG/M2 | HEART RATE: 84 BPM | HEIGHT: 68 IN | RESPIRATION RATE: 16 BRPM

## 2021-06-29 DIAGNOSIS — Z00.01 ENCOUNTER FOR WELL ADULT EXAM WITH ABNORMAL FINDINGS: Primary | ICD-10-CM

## 2021-06-29 DIAGNOSIS — E66.01 MORBID OBESITY (HCC): ICD-10-CM

## 2021-06-29 DIAGNOSIS — R60.9 DEPENDENT EDEMA: ICD-10-CM

## 2021-06-29 DIAGNOSIS — F32.A DEPRESSION, UNSPECIFIED DEPRESSION TYPE: ICD-10-CM

## 2021-06-29 DIAGNOSIS — E11.9 TYPE 2 DIABETES MELLITUS WITHOUT COMPLICATION, WITHOUT LONG-TERM CURRENT USE OF INSULIN (HCC): ICD-10-CM

## 2021-06-29 PROCEDURE — 99395 PREV VISIT EST AGE 18-39: CPT | Performed by: FAMILY MEDICINE

## 2021-06-29 ASSESSMENT — ENCOUNTER SYMPTOMS
COLOR CHANGE: 0
TROUBLE SWALLOWING: 0
ABDOMINAL DISTENTION: 0
ANAL BLEEDING: 0
RHINORRHEA: 0
EYE PAIN: 0
EYE DISCHARGE: 0
SINUS PRESSURE: 0
BLOOD IN STOOL: 0
CONSTIPATION: 0
ABDOMINAL PAIN: 0
COUGH: 0
NAUSEA: 0
DIARRHEA: 0
CHEST TIGHTNESS: 0
SHORTNESS OF BREATH: 0
WHEEZING: 0
SORE THROAT: 0
VOMITING: 0

## 2021-06-29 NOTE — ASSESSMENT & PLAN NOTE
Patient to manage conservatively with elevation of the legs at night and regular activity during the day. Consider compression hose in the future for more aggressive management.

## 2021-06-29 NOTE — PROGRESS NOTES
Joanne Olmstead (: 1995) is a 27 y.o. female, Established patient, who presents today for:    Chief Complaint   Patient presents with    Annual Exam     Pt presents today for a physical needed for work. HPI    Patient presents for routine well visit    Current Outpatient Medications on File Prior to Visit   Medication Sig Dispense Refill    vitamin D (ERGOCALCIFEROL) 1.25 MG (56606 UT) CAPS capsule Take 1 capsule by mouth once a week 12 capsule 0    Cholecalciferol (VITAMIN D3) 50 MCG (2000 UT) CAPS Take 1 capsule by mouth daily 90 capsule 3    blood glucose monitor kit and supplies DX: diabetes mellitus. Test 1 time(s) daily - Ok to substitute per insurance 1 kit 0    blood glucose monitor strips DX: diabetes mellitus. Use 1 time(s) daily - Ok to substitute per insurance 100 strip 5    Lancets MISC DX: diabetes mellitus. Use 1 time(s) daily - Ok to substitute per insurance (1 each = 1 box) 100 each 5    Alcohol Swabs PADS DX: diabetes mellitus.  Test 1 time(s) daily - Ok to substitute per insurance (1 each = 1 box) 100 each 5     Current Facility-Administered Medications on File Prior to Visit   Medication Dose Route Frequency Provider Last Rate Last Admin    lactated ringers infusion   Intravenous Continuous Nadege Castro MD        sodium chloride flush 0.9 % injection 10 mL  10 mL Intravenous 2 times per day Socorro Scott MD        sodium chloride flush 0.9 % injection 10 mL  10 mL Intravenous PRN Socorro Scott MD        nalbuphine (NUBAIN) injection 10 mg  10 mg Intravenous Q2H PRN Socorro Scott MD        oxytocin (PITOCIN) 30 units in 500 mL infusion  1 israel-units/min Intravenous Continuous Nadege Castro MD        simethicone (MYLICON) chewable tablet 80 mg  80 mg Oral Q6H PRN Socorro Scott MD        docusate sodium (COLACE) capsule 100 mg  100 mg Oral BID Nadege aCstro MD        magnesium hydroxide (MILK OF MAGNESIA) 400 MG/5ML suspension 30 mL  30 mL Oral Daily PRN Nadege MCKEON MD Matthew        bisacodyl (DULCOLAX) suppository 10 mg  10 mg Rectal Daily PRN Mary Lou Jean MD        ondansetron (ZOFRAN) injection 4 mg  4 mg Intravenous Q6H PRN Mary Lou Jean MD        famotidine (PEPCID) injection 20 mg  20 mg Intravenous BID Mary Lou Jean MD        oxytocin (PITOCIN) 30 units in 500 mL infusion  1 israel-units/min Intravenous Continuous PRN Nadege Castro MD           Allergies   Allergen Reactions    Tomato Rash        Review of Systems   Constitutional: Negative for appetite change, chills, diaphoresis, fatigue, fever and unexpected weight change. HENT: Negative for congestion, ear pain, postnasal drip, rhinorrhea, sinus pressure, sore throat and trouble swallowing. Eyes: Negative for pain, discharge and visual disturbance. Respiratory: Negative for cough, chest tightness, shortness of breath and wheezing. Cardiovascular: Positive for leg swelling (ankles, better in AM and worse at end of day). Negative for chest pain and palpitations. No orthopnea, No PND   Gastrointestinal: Negative for abdominal distention, abdominal pain, anal bleeding, blood in stool, constipation, diarrhea, nausea and vomiting. No heartburn, No melena   Endocrine: Negative for cold intolerance, heat intolerance, polydipsia, polyphagia and polyuria. Genitourinary: Negative for dysuria, flank pain, frequency, hematuria and urgency. Musculoskeletal: Negative for gait problem and myalgias. Skin: Negative for color change and rash. Neurological: Negative for dizziness, tremors, seizures, syncope, facial asymmetry, speech difficulty, weakness, light-headedness, numbness and headaches. Hematological: Negative for adenopathy. Psychiatric/Behavioral: Positive for dysphoric mood (intermittent). Negative for self-injury and suicidal ideas. The patient is not nervous/anxious.         Vitals:  /78 (Site: Right Upper Arm, Position: Sitting, Cuff Size: Large Adult)   Pulse 84   Temp 97.8 °F (36.6 °C) (Temporal)   Resp 16   Ht 5' 8\" (1.727 m)   Wt 276 lb (125.2 kg)   SpO2 98%   BMI 41.97 kg/m²     Physical Exam  Vitals reviewed. Constitutional:       General: She is not in acute distress. Appearance: Normal appearance. She is well-developed. She is not diaphoretic. HENT:      Head: Normocephalic and atraumatic. Salivary Glands: Right salivary gland is not diffusely enlarged or tender. Left salivary gland is not diffusely enlarged or tender. Right Ear: Tympanic membrane, ear canal and external ear normal. No middle ear effusion. Tympanic membrane is not erythematous. Left Ear: Tympanic membrane, ear canal and external ear normal.  No middle ear effusion. Tympanic membrane is not erythematous. Nose: Nose normal. No congestion. Right Sinus: No maxillary sinus tenderness or frontal sinus tenderness. Left Sinus: No maxillary sinus tenderness or frontal sinus tenderness. Mouth/Throat:      Lips: Pink. No lesions. Mouth: Mucous membranes are moist. No oral lesions. Tongue: No lesions. Palate: No mass and lesions. Pharynx: Oropharynx is clear. No oropharyngeal exudate or posterior oropharyngeal erythema. Eyes:      General: No scleral icterus. Right eye: No discharge. Left eye: No discharge. Extraocular Movements: Extraocular movements intact. Conjunctiva/sclera: Conjunctivae normal.      Pupils: Pupils are equal, round, and reactive to light. Neck:      Thyroid: No thyroid mass, thyromegaly or thyroid tenderness. Vascular: No carotid bruit. Cardiovascular:      Rate and Rhythm: Normal rate and regular rhythm. Pulses:           Posterior tibial pulses are 2+ on the right side and 2+ on the left side. Heart sounds: Normal heart sounds, S1 normal and S2 normal. No murmur heard. Pulmonary:      Effort: Pulmonary effort is normal. No tachypnea, accessory muscle usage or respiratory distress. recommended lifestyle changes are reached. She declines referral to dietician stating she has information from a previous dietitian visit and has been following recommendations made at that time. Reviewed medication options to help with weight loss and patient agrees to try GLP-1 agonist for help with weight loss in known setting of diabetes. Orders:  -     Semaglutide,0.25 or 0.5MG/DOS, 2 MG/1.5ML SOPN; Inject 0.25 mg into the skin once a week for 28 days, THEN 0.5 mg once a week for 28 days. , Disp-8 pen, R-0Normal  3. Type 2 diabetes mellitus without complication, without long-term current use of insulin (HCC)  -     Semaglutide,0.25 or 0.5MG/DOS, 2 MG/1.5ML SOPN; Inject 0.25 mg into the skin once a week for 28 days, THEN 0.5 mg once a week for 28 days. , Disp-8 pen, R-0Normal  -     Amb External Referral To Optometry  4. Dependent edema  Assessment & Plan:   Patient to manage conservatively with elevation of the legs at night and regular activity during the day. Consider compression hose in the future for more aggressive management. 5. Depression, unspecified depression type  Assessment & Plan:  Patient reporting bouts of depressed mood without any SI/HI or self harming behaviors. She has initiated counseling/therapy for initial management. We will follow closely over time, I reviewed with patient medication options for further management and we will discuss further at subsequent visits. Return in about 2 months (around 8/29/2021) for Chronic Disease Check. An electronic signature was used to authenticate this note.      Shelby Salcedo MD

## 2021-06-29 NOTE — ASSESSMENT & PLAN NOTE
Patient reporting bouts of depressed mood without any SI/HI or self harming behaviors. She has initiated counseling/therapy for initial management. We will follow closely over time, I reviewed with patient medication options for further management and we will discuss further at subsequent visits.

## 2021-07-02 ENCOUNTER — TELEPHONE (OUTPATIENT)
Dept: FAMILY MEDICINE CLINIC | Age: 26
End: 2021-07-02

## 2021-07-02 NOTE — TELEPHONE ENCOUNTER
Pt called stating she needs the doctor to call her   pharmacy to Let them know that he approvers her medication

## 2021-07-13 NOTE — TELEPHONE ENCOUNTER
Pt needs prior auth for this medication. Pharmacist stated he will send over PA information to our office. I will initiate PA once received.

## 2021-08-14 NOTE — TELEPHONE ENCOUNTER
----- Message from Kerline Nunn sent at 8/10/2021  9:25 AM EDT -----  Subject: Message to Provider    QUESTIONS  Information for Provider? pt called in with questions about the script for   the equipment and supplies to check her blood sugar/ adv that doc said   there were some insurance issues, she wanted to f/u to see if anything has   changed/   ---------------------------------------------------------------------------  --------------  CALL BACK INFO  What is the best way for the office to contact you? OK to leave message on   voicemail, OK to respond with electronic message via [employer] portal (only   for patients who have registered [employer] account)  Preferred Call Back Phone Number? [phone]  ---------------------------------------------------------------------------  --------------  SCRIPT ANSWERS  Relationship to Patient?  Self

## 2021-08-16 NOTE — TELEPHONE ENCOUNTER
Please call patient and pharmacy to clarify. Generic RX for glucometer and testing supplies was sent 05/17/2021. There should have been no issues with getting this filled. Is patient referring to the [facility] prescription? I know a prior Donata Comber was needed for this, please find out status and send update back to me.

## 2021-08-20 NOTE — TELEPHONE ENCOUNTER
Called pt. She stated she is still waiting on Semaglutide,0.25 or 0.5MG prior auth. I called pharmacy to verify which insurance is requesting the PA since she has 3 insurances. I was told the [employer] is the one requiring PA. I called [employer] and they are faxing over the form needed signed for the PA. There is a 24-48hr turn around.    Reference number is #[id number]    Will await paperwork

## 2021-12-17 ENCOUNTER — OFFICE VISIT (OUTPATIENT)
Dept: FAMILY MEDICINE CLINIC | Age: 26
End: 2021-12-17
Payer: COMMERCIAL

## 2021-12-17 VITALS
TEMPERATURE: 97.2 F | DIASTOLIC BLOOD PRESSURE: 64 MMHG | HEIGHT: 68 IN | HEART RATE: 76 BPM | OXYGEN SATURATION: 99 % | SYSTOLIC BLOOD PRESSURE: 126 MMHG | WEIGHT: 262 LBS | BODY MASS INDEX: 39.71 KG/M2

## 2021-12-17 DIAGNOSIS — E11.9 TYPE 2 DIABETES MELLITUS WITHOUT COMPLICATION, WITHOUT LONG-TERM CURRENT USE OF INSULIN (HCC): Primary | ICD-10-CM

## 2021-12-17 DIAGNOSIS — E55.9 VITAMIN D DEFICIENCY: ICD-10-CM

## 2021-12-17 DIAGNOSIS — Z23 NEED FOR VACCINATION: ICD-10-CM

## 2021-12-17 DIAGNOSIS — E66.01 MORBID OBESITY (HCC): ICD-10-CM

## 2021-12-17 DIAGNOSIS — E78.6 LOW HDL (UNDER 40): ICD-10-CM

## 2021-12-17 PROBLEM — Z86.16 HISTORY OF 2019 NOVEL CORONAVIRUS DISEASE (COVID-19): Status: ACTIVE | Noted: 2021-12-17

## 2021-12-17 PROBLEM — Z86.16 HISTORY OF 2019 NOVEL CORONAVIRUS DISEASE (COVID-19): Chronic | Status: ACTIVE | Noted: 2021-12-17

## 2021-12-17 LAB — HBA1C MFR BLD: 5.6 %

## 2021-12-17 PROCEDURE — 3044F HG A1C LEVEL LT 7.0%: CPT | Performed by: FAMILY MEDICINE

## 2021-12-17 PROCEDURE — G8417 CALC BMI ABV UP PARAM F/U: HCPCS | Performed by: FAMILY MEDICINE

## 2021-12-17 PROCEDURE — 2022F DILAT RTA XM EVC RTNOPTHY: CPT | Performed by: FAMILY MEDICINE

## 2021-12-17 PROCEDURE — G8427 DOCREV CUR MEDS BY ELIG CLIN: HCPCS | Performed by: FAMILY MEDICINE

## 2021-12-17 PROCEDURE — 1036F TOBACCO NON-USER: CPT | Performed by: FAMILY MEDICINE

## 2021-12-17 PROCEDURE — 99214 OFFICE O/P EST MOD 30 MIN: CPT | Performed by: FAMILY MEDICINE

## 2021-12-17 PROCEDURE — 83036 HEMOGLOBIN GLYCOSYLATED A1C: CPT | Performed by: FAMILY MEDICINE

## 2021-12-17 PROCEDURE — G8484 FLU IMMUNIZE NO ADMIN: HCPCS | Performed by: FAMILY MEDICINE

## 2021-12-17 RX ORDER — PEN NEEDLE, DIABETIC 31 GX5/16"
1 NEEDLE, DISPOSABLE MISCELLANEOUS DAILY
Qty: 100 EACH | Refills: 1 | Status: SHIPPED | OUTPATIENT
Start: 2021-12-17 | End: 2021-12-17 | Stop reason: CLARIF

## 2021-12-17 RX ORDER — PEN NEEDLE, DIABETIC 31 GX5/16"
1 NEEDLE, DISPOSABLE MISCELLANEOUS DAILY
Qty: 100 EACH | Refills: 1 | Status: SHIPPED | OUTPATIENT
Start: 2021-12-17 | End: 2022-04-22 | Stop reason: SDUPTHER

## 2021-12-17 RX ORDER — LIRAGLUTIDE 6 MG/ML
INJECTION, SOLUTION SUBCUTANEOUS
Qty: 9 ML | Refills: 1 | Status: SHIPPED | OUTPATIENT
Start: 2021-12-17 | End: 2022-01-14

## 2021-12-17 ASSESSMENT — ENCOUNTER SYMPTOMS
WHEEZING: 0
CHEST TIGHTNESS: 0
CONSTIPATION: 0
COUGH: 0
BLOOD IN STOOL: 0
ANAL BLEEDING: 0
NAUSEA: 0
ABDOMINAL PAIN: 0
DIARRHEA: 0
SHORTNESS OF BREATH: 0
VOMITING: 0

## 2021-12-17 NOTE — ASSESSMENT & PLAN NOTE
Patient was congratulated on purposeful weight loss and was encouraged to continue with healthy lifestyle measures including a lower carbohydrate diet and routine exercise 3 to 4 days a week. Due to a known history of diabetes and desire for aggressive obesity management I reviewed with patient the benefits of a GLP-1 agonist with helping with weight loss. Prescription for Himanshu Cruz was sent to local pharmacy. Patient will be titrating dose upward every 2 weeks and will call the office when she needs her next refill to discuss dose adjustments.

## 2021-12-17 NOTE — PROGRESS NOTES
Sherri Roldan (: 1995) is a 27 y.o. female, Established patient, who presents today for:    Chief Complaint   Patient presents with    Other     Weight         ASSESSMENT/PLAN    1. Type 2 diabetes mellitus without complication, without long-term current use of insulin ([facility])  Assessment & Plan:  A1c today in the office at goal control. Patient was congratulated on lifestyle measures and encouraged to continue with healthy diet and routine exercise. Orders:  -     POCT glycosylated hemoglobin (Hb A1C)  -     Comprehensive Metabolic Panel; Future  -     Lipid Panel; Future  2. Morbid obesity ([address]  Assessment & Plan:  Patient was congratulated on purposeful weight loss and was encouraged to continue with healthy lifestyle measures including a lower carbohydrate diet and routine exercise 3 to 4 days a week. Due to a known history of diabetes and desire for aggressive obesity management I reviewed with patient the benefits of a GLP-1 agonist with helping with weight loss. Prescription for Todd Gomez was sent to local pharmacy. Patient will be titrating dose upward every 2 weeks and will call the office when she needs her next refill to discuss dose adjustments. Orders:  -     liraglutide-weight management (SAXENDA) 18 MG/3ML SOPN; Inject 0.6 mg into the skin daily for 14 days, THEN 1.2 mg daily for 14 days. , Disp-9 mL, R-1Normal  -     Insulin Pen Needle (B-D ULTRAFINE III SHORT PEN) 31G X 8 MM MISC; DAILY Starting 2021, Disp-100 each, R-1, Normal  3. Vitamin D deficiency  -     Vitamin D 25 Hydroxy; Future  4. Low HDL (under 40)  -     Lipid Panel; Future  5. Need for vaccination  Comments:  Reviewed with patient the risks of serious morbidity and even mortality with remaining unvaccinated against influenza and COVID-19. Return in about 3 months (around 3/17/2022) for WEIGHT LOSS MANAGEMENT. SUBJECTIVE/OBJECTIVE:    HPI    Patient presents for diabetes and obesity follow-up visit. Belkis Mckenzie MD        simethicone (MYLICON) chewable tablet 80 mg  80 mg Oral Q6H PRN Belkis Mckenzie MD        docusate sodium (COLACE) capsule 100 mg  100 mg Oral BID Belkis Mckenzie MD        magnesium hydroxide (MILK OF MAGNESIA) 400 MG/5ML suspension 30 mL  30 mL Oral Daily PRN Belkis Mckenzie MD        bisacodyl (DULCOLAX) suppository 10 mg  10 mg Rectal Daily PRN Belkis Mckenzie MD        ondansetron (ZOFRAN) injection 4 mg  4 mg IntraVENous Q6H PRN Belkis Mckenzie MD        famotidine (PEPCID) injection 20 mg  20 mg IntraVENous BID Belkis Mckenzie MD        oxytocin (PITOCIN) 30 units in 500 mL infusion  1 israel-units/min IntraVENous Continuous PRN Nadege Castro MD           Allergies   Allergen Reactions    Tomato Rash        Review of Systems   Constitutional: Negative for appetite change, chills, diaphoresis, fatigue, fever and unexpected weight change. Eyes: Negative for visual disturbance. Respiratory: Negative for cough, chest tightness, shortness of breath and wheezing. Cardiovascular: Negative for chest pain, palpitations and leg swelling. No orthopnea, No PND   Gastrointestinal: Negative for abdominal pain, anal bleeding, blood in stool, constipation, diarrhea, nausea and vomiting. No heartburn, No melena   Endocrine: Negative for cold intolerance, heat intolerance, polydipsia, polyphagia and polyuria. Genitourinary: Negative for dysuria and hematuria. Musculoskeletal: Negative for myalgias. Skin: Negative for rash. Neurological: Negative for dizziness, syncope, weakness, light-headedness, numbness and headaches. Psychiatric/Behavioral: Negative for dysphoric mood. The patient is not nervous/anxious.         Vitals:  /64   Pulse 76   Temp 97.2 °F (36.2 °C) (Infrared)   Ht 5' 8\" (1.727 m)   Wt 262 lb (118.8 kg)   SpO2 99%   BMI 39.84 kg/m²     Results for POC orders placed in visit on 12/17/21   POCT glycosylated hemoglobin (Hb A1C)   Result Value Ref Range    Hemoglobin A1C 5.6 %         Physical Exam  Vitals reviewed. Constitutional:       General: She is not in acute distress. Appearance: She is not ill-appearing. Eyes:      General: No scleral icterus. Neck:      Thyroid: No thyroid mass, thyromegaly or thyroid tenderness. Vascular: No carotid bruit. Cardiovascular:      Rate and Rhythm: Normal rate and regular rhythm. Heart sounds: Normal heart sounds. No murmur heard. Pulmonary:      Effort: Pulmonary effort is normal. No respiratory distress. Breath sounds: Normal breath sounds. No wheezing, rhonchi or rales. Abdominal:      General: Bowel sounds are normal. There is no distension. Palpations: Abdomen is soft. Tenderness: There is no abdominal tenderness. There is no right CVA tenderness, left CVA tenderness, guarding or rebound. Musculoskeletal:      Cervical back: Neck supple. Right lower leg: No edema. Left lower leg: No edema. Lymphadenopathy:      Cervical: No cervical adenopathy. Skin:     Findings: No rash. Neurological:      Mental Status: She is alert and oriented to person, place, and time. Psychiatric:         Mood and Affect: Mood normal.         Behavior: Behavior normal.         Thought Content: Thought content normal.         Ortho Exam (If Applicable)              An electronic signature was used to authenticate this note.      Anita Gottron, MD

## 2021-12-17 NOTE — ASSESSMENT & PLAN NOTE
A1c today in the office at goal control. Patient was congratulated on lifestyle measures and encouraged to continue with healthy diet and routine exercise.

## 2022-03-21 ENCOUNTER — OFFICE VISIT (OUTPATIENT)
Dept: OBGYN CLINIC | Age: 27
End: 2022-03-21
Payer: COMMERCIAL

## 2022-03-21 VITALS
WEIGHT: 274 LBS | SYSTOLIC BLOOD PRESSURE: 112 MMHG | DIASTOLIC BLOOD PRESSURE: 76 MMHG | HEIGHT: 68 IN | HEART RATE: 84 BPM | BODY MASS INDEX: 41.52 KG/M2

## 2022-03-21 DIAGNOSIS — N89.8 VAGINAL DISCHARGE: Primary | ICD-10-CM

## 2022-03-21 DIAGNOSIS — N89.8 VAGINAL ODOR: ICD-10-CM

## 2022-03-21 PROCEDURE — G8417 CALC BMI ABV UP PARAM F/U: HCPCS | Performed by: OBSTETRICS & GYNECOLOGY

## 2022-03-21 PROCEDURE — G8484 FLU IMMUNIZE NO ADMIN: HCPCS | Performed by: OBSTETRICS & GYNECOLOGY

## 2022-03-21 PROCEDURE — 99213 OFFICE O/P EST LOW 20 MIN: CPT | Performed by: OBSTETRICS & GYNECOLOGY

## 2022-03-21 PROCEDURE — G8427 DOCREV CUR MEDS BY ELIG CLIN: HCPCS | Performed by: OBSTETRICS & GYNECOLOGY

## 2022-03-21 PROCEDURE — 1036F TOBACCO NON-USER: CPT | Performed by: OBSTETRICS & GYNECOLOGY

## 2022-03-21 RX ORDER — METRONIDAZOLE 500 MG/1
500 TABLET ORAL 2 TIMES DAILY
Qty: 14 TABLET | Refills: 0 | Status: SHIPPED | OUTPATIENT
Start: 2022-03-21 | End: 2022-03-28

## 2022-03-21 RX ORDER — FLUCONAZOLE 150 MG/1
TABLET ORAL
Qty: 3 TABLET | Refills: 0 | Status: SHIPPED | OUTPATIENT
Start: 2022-03-21 | End: 2022-08-06

## 2022-03-21 NOTE — PROGRESS NOTES
Comment: hx chlamydia, treated   Other Topics Concern    Not on file   Social History Narrative    Working on criminal justice degree        Lives alone with son        No pets        Hobbies: Personal Care     Social Determinants of Health     Financial Resource Strain: Unknown    Difficulty of Paying Living Expenses: Patient refused   Food Insecurity: Unknown    Worried About Running Out of Food in the Last Year: Patient refused    [address] in the Last Year: Patient refused   Transportation Needs:     Lack of Transportation (Medical): Not on file    Lack of Transportation (Non-Medical): Not on file   Physical Activity:     Days of Exercise per Week: Not on file    Minutes of Exercise per Session: Not on file   Stress:     Feeling of Stress : Not on file   Social Connections:     Frequency of Communication with Friends and Family: Not on file    Frequency of Social Gatherings with Friends and Family: Not on file    Attends [employer] Services: Not on file    Active Member of [address] [place] [address] or Organizations: Not on file    Attends Club or Organization Meetings: Not on file    Marital Status: Not on file   Intimate Partner Violence:     Fear of Current or Ex-Partner: Not on file    Emotionally Abused: Not on file    Physically Abused: Not on file    Sexually Abused: Not on file   Housing Stability:     Unable to Pay for Housing in the Last Year: Not on file    Number of [place] in the Last Year: Not on file    Unstable Housing in the Last Year: Not on file         Review Of Systems:  Review of Systems   Constitutional: Negative for chills and fever. Respiratory: Negative for cough and shortness of breath. Cardiovascular: Negative for chest pain and palpitations. Gastrointestinal: Negative for nausea and vomiting. Genitourinary: Positive for vaginal discharge. Negative for dysuria, frequency and urgency. Musculoskeletal: Negative for myalgias.    Neurological: Negative for dizziness, seizures and headaches. Psychiatric/Behavioral: Negative for suicidal ideas. All other systems reviewed and are negative  Physical Exam  Constitutional:       Appearance: She is well-developed. HENT:      Head: Normocephalic and atraumatic. Eyes:      Conjunctiva/sclera: Conjunctivae normal.      Pupils: Pupils are equal, round, and reactive to light. Neck:      Musculoskeletal: Normal range of motion and neck supple. Cardiovascular:      Rate and Rhythm: Normal rate and regular rhythm. Heart sounds: Normal heart sounds. Pulmonary:      Effort: Pulmonary effort is normal.      Breath sounds: Normal breath sounds. Abdominal:      General: Bowel sounds are normal.      Palpations: Abdomen is soft. Genitourinary:     Labia:         Right: No rash, tenderness or lesion. Left: No rash, tenderness or lesion. Vagina: Vaginal discharge present. No erythema, tenderness or bleeding. Cervix: No cervical motion tenderness, discharge or friability. Uterus: Not enlarged and not tender. Adnexa:         Right: No mass, tenderness or fullness. Left: No mass, tenderness or fullness. Musculoskeletal: Normal range of motion. Neurological:      Mental Status: She is alert and oriented to person, place, and time. Deep Tendon Reflexes: Reflexes are normal and symmetric. Psychiatric:         Behavior: Behavior normal.         Thought Content: Thought content normal.     Assessment:      Diagnosis Orders   1. Vaginal discharge  Bacterial Vaginosis Panel    Candida DNA probe    C.trachomatis N.gonorrhoeae DNA    Trichomonas Screen   2.  Vaginal odor  Bacterial Vaginosis Panel    Candida DNA probe    C.trachomatis N.gonorrhoeae DNA    Trichomonas Screen         Plan:   meds sent for possible BV/YEAST   Cultures sent     Orders Placed This Encounter   Procedures    Bacterial Vaginosis Panel     Standing Status:   Future     Standing Expiration Date:   3/21/2023   Dinesh Candida DNA probe     Candida Vaginitis Panel-MDL Test Code 560     Standing Status:   Future     Standing Expiration Date:   3/21/2023    C.trachomatis N.gonorrhoeae DNA     Standing Status:   Future     Standing Expiration Date:   3/21/2023    Trichomonas Screen     Standing Status:   Future     Standing Expiration Date:   3/21/2023       Orders Placed This Encounter   Medications    metroNIDAZOLE (FLAGYL) 500 MG tablet     Sig: Take 1 tablet by mouth 2 times daily for 7 days     Dispense:  14 tablet     Refill:  0    fluconazole (DIFLUCAN) 150 MG tablet     Sig: Take 1 tablet every 2 days for 3 doses. Dispense:  3 tablet     Refill:  0       Follow up:  Return if symptoms worsen or fail to improve.     Randolph Box MD

## 2022-03-24 LAB
CHLAMYDIA TRACHOMATIS AMPLIFIED DET: NEGATIVE
N GONORRHOEAE AMPLIFIED DET: NEGATIVE

## 2022-03-25 RX ORDER — METRONIDAZOLE 500 MG/1
500 TABLET ORAL 2 TIMES DAILY
Qty: 14 TABLET | Refills: 0 | Status: SHIPPED | OUTPATIENT
Start: 2022-03-25 | End: 2022-04-01

## 2022-04-01 ENCOUNTER — TELEPHONE (OUTPATIENT)
Dept: FAMILY MEDICINE CLINIC | Age: 27
End: 2022-04-01

## 2022-04-01 NOTE — TELEPHONE ENCOUNTER
Patient called said that she has been waiting on her medicationInsulin Pen Needle (B-D ULTRAFINE III SHORT PEN) 31G X 8 MM MISC to get a prior authorization approval that the pharmacy has sent over the fax for it but when I looked in the chart I didn't see anything in there. I called the pharmacy and spoke to a Adalberto James and she said that Bridget Granger and [employer] both Denied that they want a prior authorization so I asked her to fax it over so I can give it to the medical assistance to work on. cp

## 2022-04-06 NOTE — TELEPHONE ENCOUNTER
Called [facility] spoke with [place] she said they gave [facility] a generic [facility] brand and there was no co payment of the Insulin Pen Needle (B-D ULTRAFINE III SHORT PEN) 31G X 8 MM MISC. I asked her why didn't Sudheer Crane tell me this yesterday when I had called to see why we didn't get the prior auth she said that it was picked up yesterday from [employer] the generic brand and didn't know why I was not told this. cp

## 2022-04-22 DIAGNOSIS — E66.01 MORBID OBESITY (HCC): ICD-10-CM

## 2022-04-22 RX ORDER — PEN NEEDLE, DIABETIC 31 GX5/16"
1 NEEDLE, DISPOSABLE MISCELLANEOUS DAILY
Qty: 100 EACH | Refills: 0 | Status: SHIPPED | OUTPATIENT
Start: 2022-04-22 | End: 2022-10-26

## 2022-04-22 NOTE — TELEPHONE ENCOUNTER
Patient is requesting medication refill.  Please approve or deny this request.    Rx requested:  Requested Prescriptions     Pending Prescriptions Disp Refills    Insulin Pen Needle (B-D ULTRAFINE III SHORT PEN) 31G X 8 MM MISC 100 each 1     Si each by Does not apply route daily         Last Office Visit:   2021      Next Visit Date:  Future Appointments   Date Time Provider Chidi Lowery   2022  9:00 AM Matias Romero MD [address] [place] [address] Street

## 2022-07-22 ENCOUNTER — OFFICE VISIT (OUTPATIENT)
Dept: FAMILY MEDICINE CLINIC | Age: 27
End: 2022-07-22
Payer: COMMERCIAL

## 2022-07-22 VITALS
HEART RATE: 77 BPM | HEIGHT: 68 IN | WEIGHT: 272 LBS | BODY MASS INDEX: 41.22 KG/M2 | SYSTOLIC BLOOD PRESSURE: 112 MMHG | TEMPERATURE: 96.9 F | OXYGEN SATURATION: 96 % | DIASTOLIC BLOOD PRESSURE: 76 MMHG

## 2022-07-22 DIAGNOSIS — E66.01 MORBID OBESITY (HCC): ICD-10-CM

## 2022-07-22 DIAGNOSIS — E78.6 LOW HDL (UNDER 40): ICD-10-CM

## 2022-07-22 DIAGNOSIS — F32.A DEPRESSION, UNSPECIFIED DEPRESSION TYPE: ICD-10-CM

## 2022-07-22 DIAGNOSIS — E11.9 TYPE 2 DIABETES MELLITUS WITHOUT COMPLICATION, WITHOUT LONG-TERM CURRENT USE OF INSULIN (HCC): Primary | ICD-10-CM

## 2022-07-22 DIAGNOSIS — E55.9 VITAMIN D DEFICIENCY: ICD-10-CM

## 2022-07-22 LAB — HBA1C MFR BLD: 6.1 %

## 2022-07-22 PROCEDURE — 3044F HG A1C LEVEL LT 7.0%: CPT | Performed by: FAMILY MEDICINE

## 2022-07-22 PROCEDURE — 1036F TOBACCO NON-USER: CPT | Performed by: FAMILY MEDICINE

## 2022-07-22 PROCEDURE — G8417 CALC BMI ABV UP PARAM F/U: HCPCS | Performed by: FAMILY MEDICINE

## 2022-07-22 PROCEDURE — 83036 HEMOGLOBIN GLYCOSYLATED A1C: CPT | Performed by: FAMILY MEDICINE

## 2022-07-22 PROCEDURE — 2022F DILAT RTA XM EVC RTNOPTHY: CPT | Performed by: FAMILY MEDICINE

## 2022-07-22 PROCEDURE — 99214 OFFICE O/P EST MOD 30 MIN: CPT | Performed by: FAMILY MEDICINE

## 2022-07-22 PROCEDURE — G8427 DOCREV CUR MEDS BY ELIG CLIN: HCPCS | Performed by: FAMILY MEDICINE

## 2022-07-22 SDOH — ECONOMIC STABILITY: FOOD INSECURITY: WITHIN THE PAST 12 MONTHS, YOU WORRIED THAT YOUR FOOD WOULD RUN OUT BEFORE YOU GOT MONEY TO BUY MORE.: NEVER TRUE

## 2022-07-22 SDOH — ECONOMIC STABILITY: FOOD INSECURITY: WITHIN THE PAST 12 MONTHS, THE FOOD YOU BOUGHT JUST DIDN'T LAST AND YOU DIDN'T HAVE MONEY TO GET MORE.: NEVER TRUE

## 2022-07-22 ASSESSMENT — ENCOUNTER SYMPTOMS
CHEST TIGHTNESS: 0
COUGH: 0
BLOOD IN STOOL: 0
VOMITING: 0
ABDOMINAL PAIN: 0
WHEEZING: 0
NAUSEA: 0
DIARRHEA: 0
ANAL BLEEDING: 0
SHORTNESS OF BREATH: 0
CONSTIPATION: 0

## 2022-07-22 ASSESSMENT — PATIENT HEALTH QUESTIONNAIRE - PHQ9
7. TROUBLE CONCENTRATING ON THINGS, SUCH AS READING THE NEWSPAPER OR WATCHING TELEVISION: 0
SUM OF ALL RESPONSES TO PHQ QUESTIONS 1-9: 11
1. LITTLE INTEREST OR PLEASURE IN DOING THINGS: 3
2. FEELING DOWN, DEPRESSED OR HOPELESS: 1
SUM OF ALL RESPONSES TO PHQ QUESTIONS 1-9: 11
8. MOVING OR SPEAKING SO SLOWLY THAT OTHER PEOPLE COULD HAVE NOTICED. OR THE OPPOSITE, BEING SO FIGETY OR RESTLESS THAT YOU HAVE BEEN MOVING AROUND A LOT MORE THAN USUAL: 0
SUM OF ALL RESPONSES TO PHQ QUESTIONS 1-9: 11
3. TROUBLE FALLING OR STAYING ASLEEP: 3
9. THOUGHTS THAT YOU WOULD BE BETTER OFF DEAD, OR OF HURTING YOURSELF: 0
SUM OF ALL RESPONSES TO PHQ QUESTIONS 1-9: 11
10. IF YOU CHECKED OFF ANY PROBLEMS, HOW DIFFICULT HAVE THESE PROBLEMS MADE IT FOR YOU TO DO YOUR WORK, TAKE CARE OF THINGS AT HOME, OR GET ALONG WITH OTHER PEOPLE: 1
6. FEELING BAD ABOUT YOURSELF - OR THAT YOU ARE A FAILURE OR HAVE LET YOURSELF OR YOUR FAMILY DOWN: 0
SUM OF ALL RESPONSES TO PHQ9 QUESTIONS 1 & 2: 4
5. POOR APPETITE OR OVEREATING: 1
4. FEELING TIRED OR HAVING LITTLE ENERGY: 3

## 2022-07-22 ASSESSMENT — SOCIAL DETERMINANTS OF HEALTH (SDOH): HOW HARD IS IT FOR YOU TO PAY FOR THE VERY BASICS LIKE FOOD, HOUSING, MEDICAL CARE, AND HEATING?: NOT HARD AT ALL

## 2022-07-22 NOTE — ASSESSMENT & PLAN NOTE
Patient meets criteria to consider surgical weight loss options. We have been unable to achieve coverage for medication for weight loss through insurance. I reviewed with patient the need to continue making best efforts with healthy lower carbohydrate and lower saturated fat diet. I stressed with her the importance of continuing to work toward a goal of 150 minutes weekly of moderate intensity or 75 minutes weekly of high intensity exercise. Referral has been given to bariatrics program at [address] [place] [address] to establish care and discuss management options.

## 2022-07-22 NOTE — PROGRESS NOTES
Atilio Currie (: 1995) is a 24 y.o. female, Established patient, who presents today for:    Chief Complaint   Patient presents with    Weight Loss     Patient is present for interests in weight loss surgery. ASSESSMENT/PLAN    1. Type 2 diabetes mellitus without complication, without long-term current use of insulin ([facility])  Assessment & Plan:  A1c remains at goal control. Patient will continue making best efforts to adhere to a lower carbohydrate diet and we will continue to follow over time. Referral has been placed to bariatric surgery office per patient request.  See below  Orders:  -     POCT glycosylated hemoglobin (Hb A1C)  -     Amb External Referral To Bariatric Surgery  -     Amb External Referral To Bariatric Surgery  -     CBC with Auto Differential; Future  -     Comprehensive Metabolic Panel; Future  -     Lipid Panel; Future  -     Microalbumin / Creatinine Urine Ratio; Future  -     TSH with Reflex; Future  -      DIABETES FOOT EXAM  2. Morbid obesity ([address]  Assessment & Plan:  Patient meets criteria to consider surgical weight loss options. We have been unable to achieve coverage for medication for weight loss through insurance. I reviewed with patient the need to continue making best efforts with healthy lower carbohydrate and lower saturated fat diet. I stressed with her the importance of continuing to work toward a goal of 150 minutes weekly of moderate intensity or 75 minutes weekly of high intensity exercise. Referral has been given to bariatrics program at [address] [place] [address] to establish care and discuss management options. Orders:  -     Amb External Referral To Bariatric Surgery  -     Amb External Referral To Bariatric Surgery  -     Comprehensive Metabolic Panel; Future  -     Lipid Panel; Future  -     TSH with Reflex; Future  3. Low HDL (under 40)  -     Comprehensive Metabolic Panel; Future  -     Lipid Panel; Future  4.  Vitamin D deficiency  -     Vitamin D 25 Hydroxy; Future  5. Depression, unspecified depression type  Assessment & Plan:  Patient encouraged to continue with counseling/therapy as currently established. We reviewed options for medication management, patient continues to decline pharmacologic intervention. There is no reported SI/HI or self harming behaviors. Patient denies any problems with anxiety or panic. We will continue to monitor closely over time, patient was encouraged to contact the office should she change her mind in the future about medication management for depressed mood. Return for Annual Physical in 4-6 Months. SUBJECTIVE/OBJECTIVE:    HPI    Patient presents for acute visit to discuss obesity management. Patient has a known history of diabetes managed with lower carbohydrate diet and routine exercise. Patient reports previously exercising on a daily basis and losing 15 pounds, but states that over the past 6 months her gains have been erased and she has gained weight overall. Due to problems with obtaining childcare patient reports being unable to go to the gym, and despite best efforts to eat a healthy diet, weight has increased over time. Medication for weight loss has been denied by insurance. Patient states she would like to speak to a bariatric surgeon to consider surgical weight loss options. Patient denies checking home blood glucose values since the most recent office visit. They deny any polyuria or polydipsia, new onset vision changes, or new onset paresthesias. Patient denies any chest pain, dyspnea, palpitations, lightheadedness, dizziness, worsening lower extremity edema, or syncope. Patient denies any dyspnea at rest, persistent wheezing, worsening cough, chest tightness, or limitation in normal day-to-day activity due to breathing. Patient reports establishing care with counselor/therapist for management of depressed mood and is going to regular counseling sessions.   There is increased stress at home due to the mother of her significant other recently being diagnosed with advanced stage cancer. Her significant other has been providing nursing care and has not been around to help with childcare duties at home. Patient reports still being able to function at a high level and experience amanda in her life, but does report continued bouts of depressed mood with no reported SI/HI or self harming behaviors. There is reported decreased energy overall with increased sleeping, but patient denies any change in appetite or anhedonia. There is no reported worsening anxiety or panic. Current Outpatient Medications on File Prior to Visit   Medication Sig Dispense Refill    fluconazole (DIFLUCAN) 150 MG tablet Take 1 tablet every 2 days for 3 doses. 3 tablet 0    Cholecalciferol (VITAMIN D3) 50 MCG (2000 UT) CAPS Take 1 capsule by mouth daily 90 capsule 3    blood glucose monitor kit and supplies DX: diabetes mellitus. Test 1 time(s) daily - Ok to substitute per insurance 1 kit 0    blood glucose monitor strips DX: diabetes mellitus. Use 1 time(s) daily - Ok to substitute per insurance 100 strip 5    Lancets MISC DX: diabetes mellitus. Use 1 time(s) daily - Ok to substitute per insurance (1 each = 1 box) 100 each 5    Alcohol Swabs PADS DX: diabetes mellitus.  Test 1 time(s) daily - Ok to substitute per insurance (1 each = 1 box) 100 each 5    Insulin Pen Needle (B-D ULTRAFINE III SHORT PEN) 31G X 8 MM MISC 1 each by Does not apply route daily (Patient not taking: Reported on 7/22/2022) 100 each 0    vitamin D (ERGOCALCIFEROL) 1.25 MG (75726 UT) CAPS capsule Take 1 capsule by mouth once a week 12 capsule 0     Current Facility-Administered Medications on File Prior to Visit   Medication Dose Route Frequency Provider Last Rate Last Admin    lactated ringers infusion   IntraVENous Continuous Nadege Castro MD        sodium chloride flush 0.9 % injection 10 mL  10 mL IntraVENous 2 times per day Lopez Mckeon MD sodium chloride flush 0.9 % injection 10 mL  10 mL IntraVENous PRN Nadege Castro MD        nalbuphine (NUBAIN) injection 10 mg  10 mg IntraVENous Q2H PRN Nadege Castro MD        oxytocin (PITOCIN) 30 units in 500 mL infusion  1 israel-units/min IntraVENous Continuous Nadege Castro MD        simethicone (MYLICON) chewable tablet 80 mg  80 mg Oral Q6H PRN Nadege Castro MD        docusate sodium (COLACE) capsule 100 mg  100 mg Oral BID Nadege Castro MD        magnesium hydroxide (MILK OF MAGNESIA) 400 MG/5ML suspension 30 mL  30 mL Oral Daily PRN Patel Buckley MD        bisacodyl (DULCOLAX) suppository 10 mg  10 mg Rectal Daily PRN Nadege Castro MD        ondansetron (ZOFRAN) injection 4 mg  4 mg IntraVENous Q6H PRN Nadege Castro MD        famotidine (PEPCID) injection 20 mg  20 mg IntraVENous BID Nadege Castro MD        oxytocin (PITOCIN) 30 units in 500 mL infusion  1 israel-units/min IntraVENous Continuous PRN Nadege Castro MD           Allergies   Allergen Reactions    Tomato Rash        Review of Systems   Constitutional:  Negative for appetite change, chills, diaphoresis, fatigue, fever and unexpected weight change. Eyes:  Negative for visual disturbance. Respiratory:  Negative for cough, chest tightness, shortness of breath and wheezing. Cardiovascular:  Negative for chest pain, palpitations and leg swelling. No orthopnea, No PND   Gastrointestinal:  Negative for abdominal pain, anal bleeding, blood in stool, constipation, diarrhea, nausea and vomiting. No heartburn, No melena   Endocrine: Negative for cold intolerance, heat intolerance, polydipsia, polyphagia and polyuria. Genitourinary:  Negative for dysuria and hematuria. Musculoskeletal:  Negative for myalgias. Skin:  Negative for rash. Neurological:  Negative for dizziness, syncope, weakness, light-headedness, numbness and headaches. Psychiatric/Behavioral:  Positive for dysphoric mood.  Negative for decreased concentration, self-injury, sleep disturbance and suicidal ideas. The patient is not nervous/anxious. Vitals:  /76 (Site: Right Upper Arm, Position: Sitting, Cuff Size: Large Adult)   Pulse 77   Temp 96.9 °F (36.1 °C) (Temporal)   Ht 5' 8\" (1.727 m)   Wt 272 lb (123.4 kg)   SpO2 96%   BMI 41.36 kg/m²     Results for POC orders placed in visit on 07/22/22   POCT glycosylated hemoglobin (Hb A1C)   Result Value Ref Range    Hemoglobin A1C 6.1 (A) %         Physical Exam  Vitals reviewed. Constitutional:       General: She is not in acute distress. Appearance: She is obese. She is not ill-appearing. Eyes:      General: No scleral icterus. Neck:      Thyroid: No thyroid mass, thyromegaly or thyroid tenderness. Vascular: No carotid bruit. Cardiovascular:      Rate and Rhythm: Normal rate and regular rhythm. Pulses:           Dorsalis pedis pulses are 2+ on the right side and 2+ on the left side. Posterior tibial pulses are 2+ on the right side and 2+ on the left side. Heart sounds: Normal heart sounds. No murmur heard. Pulmonary:      Effort: Pulmonary effort is normal. No respiratory distress. Breath sounds: Normal breath sounds. No wheezing, rhonchi or rales. Abdominal:      General: Bowel sounds are normal. There is no distension. Palpations: Abdomen is soft. Tenderness: There is no abdominal tenderness. There is no right CVA tenderness, left CVA tenderness, guarding or rebound. Musculoskeletal:      Cervical back: Neck supple. Right lower leg: No edema. Left lower leg: No edema. Feet:      Right foot:      Protective Sensation: 10 sites tested. 10 sites sensed. Skin integrity: Callus present. No ulcer, blister, skin breakdown, erythema, warmth, dry skin or fissure. Left foot:      Protective Sensation: 10 sites tested. 10 sites sensed. Skin integrity: Callus present.  No ulcer, blister, skin breakdown, erythema, warmth, dry skin or fissure. Lymphadenopathy:      Cervical: No cervical adenopathy. Skin:     Findings: No rash. Neurological:      Mental Status: She is alert and oriented to person, place, and time. Sensory: Sensation is intact. No sensory deficit. Comments: Foot monofilament testing negative for neuropathy bilaterally   Psychiatric:         Mood and Affect: Mood normal.         Behavior: Behavior normal.         Thought Content: Thought content normal.       Ortho Exam (If Applicable)              An electronic signature was used to authenticate this note.      Gabriel Leal MD

## 2022-07-22 NOTE — ASSESSMENT & PLAN NOTE
A1c remains at goal control. Patient will continue making best efforts to adhere to a lower carbohydrate diet and we will continue to follow over time.   Referral has been placed to bariatric surgery office per patient request.  See below

## 2022-08-06 ENCOUNTER — HOSPITAL ENCOUNTER (EMERGENCY)
Dept: LAB | Age: 27
Discharge: HOME | End: 2022-08-06
Payer: COMMERCIAL

## 2022-08-06 ENCOUNTER — HOSPITAL ENCOUNTER (EMERGENCY)
Age: 27
Discharge: HOME | End: 2022-08-06
Attending: EMERGENCY MEDICINE
Payer: COMMERCIAL

## 2022-08-06 VITALS
BODY MASS INDEX: 40.01 KG/M2 | HEIGHT: 68 IN | WEIGHT: 264 LBS | OXYGEN SATURATION: 99 % | DIASTOLIC BLOOD PRESSURE: 85 MMHG | HEART RATE: 86 BPM | SYSTOLIC BLOOD PRESSURE: 147 MMHG | TEMPERATURE: 98.6 F | RESPIRATION RATE: 16 BRPM

## 2022-08-06 DIAGNOSIS — E55.9 VITAMIN D DEFICIENCY: ICD-10-CM

## 2022-08-06 DIAGNOSIS — E11.9 TYPE 2 DIABETES MELLITUS WITHOUT COMPLICATION, WITHOUT LONG-TERM CURRENT USE OF INSULIN (HCC): ICD-10-CM

## 2022-08-06 DIAGNOSIS — E66.01 MORBID OBESITY (HCC): ICD-10-CM

## 2022-08-06 DIAGNOSIS — E78.6 LOW HDL (UNDER 40): ICD-10-CM

## 2022-08-06 DIAGNOSIS — J02.9 VIRAL PHARYNGITIS: Primary | ICD-10-CM

## 2022-08-06 LAB
ALBUMIN SERPL-MCNC: 4.4 G/DL (ref 3.5–4.6)
ALP BLD-CCNC: 111 U/L (ref 40–130)
ALT SERPL-CCNC: 17 U/L (ref 0–33)
ANION GAP SERPL CALCULATED.3IONS-SCNC: 12 MEQ/L (ref 9–15)
AST SERPL-CCNC: 8 U/L (ref 0–35)
BASOPHILS ABSOLUTE: 0.1 K/UL (ref 0–0.2)
BASOPHILS RELATIVE PERCENT: 1 %
BILIRUB SERPL-MCNC: 0.4 MG/DL (ref 0.2–0.7)
BUN BLDV-MCNC: 11 MG/DL (ref 6–20)
CALCIUM SERPL-MCNC: 9.7 MG/DL (ref 8.5–9.9)
CHLORIDE BLD-SCNC: 106 MEQ/L (ref 95–107)
CHOLESTEROL, TOTAL: 122 MG/DL (ref 0–199)
CO2: 22 MEQ/L (ref 20–31)
CREAT SERPL-MCNC: 0.67 MG/DL (ref 0.5–0.9)
CREATININE URINE: 235.7 MG/DL
EOSINOPHILS ABSOLUTE: 0.1 K/UL (ref 0–0.7)
EOSINOPHILS RELATIVE PERCENT: 1.6 %
GFR AFRICAN AMERICAN: >60
GFR NON-AFRICAN AMERICAN: >60
GLOBULIN: 3.3 G/DL (ref 2.3–3.5)
GLUCOSE BLD-MCNC: 85 MG/DL (ref 70–99)
HCT VFR BLD CALC: 36.8 % (ref 37–47)
HDLC SERPL-MCNC: 40 MG/DL (ref 40–59)
HEMOGLOBIN: 12.1 G/DL (ref 12–16)
INFLUENZA A BY PCR: NEGATIVE
INFLUENZA B BY PCR: NEGATIVE
LDL CHOLESTEROL CALCULATED: 65 MG/DL (ref 0–129)
LYMPHOCYTES ABSOLUTE: 1.8 K/UL (ref 1–4.8)
LYMPHOCYTES RELATIVE PERCENT: 19.6 %
MCH RBC QN AUTO: 28.3 PG (ref 27–31.3)
MCHC RBC AUTO-ENTMCNC: 33 % (ref 33–37)
MCV RBC AUTO: 85.9 FL (ref 82–100)
MICROALBUMIN UR-MCNC: <1.2 MG/DL
MICROALBUMIN/CREAT UR-RTO: NORMAL MG/G (ref 0–30)
MONOCYTES ABSOLUTE: 0.5 K/UL (ref 0.2–0.8)
MONOCYTES RELATIVE PERCENT: 4.9 %
NEUTROPHILS ABSOLUTE: 6.8 K/UL (ref 1.4–6.5)
NEUTROPHILS RELATIVE PERCENT: 72.9 %
PDW BLD-RTO: 14.4 % (ref 11.5–14.5)
PLATELET # BLD: 379 K/UL (ref 130–400)
POTASSIUM SERPL-SCNC: 4 MEQ/L (ref 3.4–4.9)
RBC # BLD: 4.28 M/UL (ref 4.2–5.4)
SARS-COV-2, NAAT: NOT DETECTED
SODIUM BLD-SCNC: 140 MEQ/L (ref 135–144)
STREP GRP A PCR: NEGATIVE
TOTAL PROTEIN: 7.7 G/DL (ref 6.3–8)
TRIGL SERPL-MCNC: 86 MG/DL (ref 0–150)
TSH REFLEX: 0.87 UIU/ML (ref 0.44–3.86)
WBC # BLD: 9.3 K/UL (ref 4.8–10.8)

## 2022-08-06 PROCEDURE — 99283 EMERGENCY DEPT VISIT LOW MDM: CPT

## 2022-08-06 PROCEDURE — 87651 STREP A DNA AMP PROBE: CPT

## 2022-08-06 PROCEDURE — 82043 UR ALBUMIN QUANTITATIVE: CPT

## 2022-08-06 PROCEDURE — 82306 VITAMIN D 25 HYDROXY: CPT

## 2022-08-06 PROCEDURE — 84443 ASSAY THYROID STIM HORMONE: CPT

## 2022-08-06 PROCEDURE — 80061 LIPID PANEL: CPT

## 2022-08-06 PROCEDURE — 82570 ASSAY OF URINE CREATININE: CPT

## 2022-08-06 PROCEDURE — 36415 COLL VENOUS BLD VENIPUNCTURE: CPT

## 2022-08-06 PROCEDURE — 85025 COMPLETE CBC W/AUTO DIFF WBC: CPT

## 2022-08-06 PROCEDURE — 87502 INFLUENZA DNA AMP PROBE: CPT

## 2022-08-06 PROCEDURE — 87635 SARS-COV-2 COVID-19 AMP PRB: CPT

## 2022-08-06 PROCEDURE — 80053 COMPREHEN METABOLIC PANEL: CPT

## 2022-08-06 RX ORDER — BENZOCAINE AND MENTHOL, UNSPECIFIED FORM 15; 2.3 MG/1; MG/1
1 LOZENGE ORAL 4 TIMES DAILY PRN
Qty: 32 LOZENGE | Refills: 0 | Status: SHIPPED | OUTPATIENT
Start: 2022-08-06 | End: 2022-09-16

## 2022-08-06 ASSESSMENT — ENCOUNTER SYMPTOMS
COLOR CHANGE: 0
COUGH: 0
BACK PAIN: 0
SINUS PRESSURE: 0
SHORTNESS OF BREATH: 0
EYE PAIN: 0
PHOTOPHOBIA: 0
ABDOMINAL PAIN: 0
DIARRHEA: 0
CONSTIPATION: 0
SORE THROAT: 1
NAUSEA: 0
VOMITING: 0
ABDOMINAL DISTENTION: 0
RHINORRHEA: 0
WHEEZING: 0
TROUBLE SWALLOWING: 1
APNEA: 0

## 2022-08-06 ASSESSMENT — PAIN - FUNCTIONAL ASSESSMENT
PAIN_FUNCTIONAL_ASSESSMENT: 0-10
PAIN_FUNCTIONAL_ASSESSMENT: 0-10

## 2022-08-06 ASSESSMENT — PAIN SCALES - GENERAL
PAINLEVEL_OUTOF10: 8
PAINLEVEL_OUTOF10: 6

## 2022-08-06 ASSESSMENT — PAIN DESCRIPTION - PAIN TYPE: TYPE: ACUTE PAIN

## 2022-08-06 ASSESSMENT — PAIN DESCRIPTION - DESCRIPTORS
DESCRIPTORS: SORE
DESCRIPTORS: SORE

## 2022-08-06 ASSESSMENT — PAIN DESCRIPTION - ONSET
ONSET: ON-GOING
ONSET: ON-GOING

## 2022-08-06 ASSESSMENT — PAIN DESCRIPTION - LOCATION
LOCATION: THROAT
LOCATION: THROAT

## 2022-08-06 NOTE — ED TRIAGE NOTES
Pt c/o sore throat and pain with swallowing. Pain rated 8/10, nothing taken for pain today, has been taking Tylenol. Pt also c/o yellow sinus drainage. Pt resp even, unlabored, skin w/d. Pt swabbed for strep and specimen labeled and taken to lab. Pt was seen at Urgent Care 1 week ago, swabbed for COVID, negative results. Pt plan of care explained to Pt during bedside exam by Dr. Tomeka Quarles. Pt also swabbed for flu and COVID, specimens labeled and taken to lab.

## 2022-08-06 NOTE — ED PROVIDER NOTES
[address] [place] [address] ED  eMERGENCY dEPARTMENT eNCOUnter      Pt Name: Mian Moseley  MRN: [id number]  [place] 1995  Date of evaluation: 8/6/2022  Provider: Nargis Beard MD    CHIEF COMPLAINT       Chief Complaint   Patient presents with    Pharyngitis     Pt c/o sore throat and pain with swallowing, sinus drainage yellow, onset 2 days         HISTORY OF PRESENT ILLNESS   (Location/Symptom, Timing/Onset,Context/Setting, Quality, Duration, Modifying Factors, Severity)  Note limiting factors. Mian Moseley is a 24 y.o. female who presents to the emergency department with complaint of sorethroat of 2 weeks duration. Also problems swallowing over the last 2 days. No fever or chills. No nausea or vomiting. No cough or congestion. No other systemic signs times. Comorbid conditions include type 2 diabetes, morbid obesity, COVID 19 infection. HPI    Nursing Notes were reviewed. REVIEW OF SYSTEMS    (2-9 systems for level 4, 10 or more for level 5)     Review of Systems   Constitutional: Negative. Negative for activity change, appetite change, chills, fatigue and fever. HENT:  Positive for sore throat and trouble swallowing. Negative for congestion, ear discharge, ear pain, hearing loss, rhinorrhea and sinus pressure. Eyes:  Negative for photophobia, pain and visual disturbance. Respiratory:  Negative for apnea, cough, shortness of breath and wheezing. Cardiovascular:  Negative for chest pain, palpitations and leg swelling. Gastrointestinal:  Negative for abdominal distention, abdominal pain, constipation, diarrhea, nausea and vomiting. Endocrine: Negative for cold intolerance, heat intolerance and polyuria. Genitourinary:  Negative for dysuria, flank pain, frequency and urgency. Musculoskeletal:  Negative for arthralgias, back pain, gait problem, myalgias and neck stiffness. Skin:  Negative for color change, pallor and rash.    Allergic/Immunologic: Negative for food allergies and immunocompromised state. Neurological:  Negative for dizziness, tremors, syncope, weakness, light-headedness and headaches. Psychiatric/Behavioral:  Negative for agitation, confusion and hallucinations. All other systems reviewed and are negative. Except as noted above the remainder of the review of systems was reviewed and negative. PAST MEDICAL HISTORY     Past Medical History:   Diagnosis Date    Depression 6/29/2021    Diet controlled gestational diabetes mellitus (GDM), antepartum 10/25/2017    History of 2019 novel coronavirus disease (COVID-19) 12/17/2021    DX 10/2021 @  ER    Hyperemesis complicating pregnancy, antepartum 01/20/2017    Low HDL (under 40) 5/25/2021    Morbid obesity ([address] 5/17/2021    Type 2 diabetes mellitus in pregnancy          SURGICAL HISTORY       Past Surgical History:   Procedure Laterality Date    TONSILLECTOMY  2015         CURRENT MEDICATIONS       Discharge Medication List as of 8/6/2022  8:13 AM        CONTINUE these medications which have NOT CHANGED    Details   Insulin Pen Needle (B-D ULTRAFINE III SHORT PEN) 31G X 8 MM MISC DAILY Starting Fri 4/22/2022, Disp-100 each, R-0, Normal      blood glucose monitor kit and supplies DX: diabetes mellitus. Test 1 time(s) daily - Ok to substitute per insurance, Disp-1 kit, R-0, Normal      blood glucose monitor strips DX: diabetes mellitus. Use 1 time(s) daily - Ok to substitute per insurance, Disp-100 strip, R-5, Normal      Lancets MISC Disp-100 each, R-5, NormalDX: diabetes mellitus. Use 1 time(s) daily - Ok to substitute per insurance (1 each = 1 box)      Alcohol Swabs PADS Disp-100 each, R-5, NormalDX: diabetes mellitus.  Test 1 time(s) daily - Ok to substitute per insurance (1 each = 1 box)             ALLERGIES     Tomato    FAMILY HISTORY       Family History   Problem Relation Age of Onset    Hypertension Mother     Asthma Mother     High Cholesterol Father     Hypertension Father     Diabetes Father Birth Defects Sister     Mental Retardation Sister     Early Death Sister     Diabetes Maternal Grandmother     Diabetes Paternal Grandmother     No Known Problems Son           SOCIAL HISTORY       Social History     Socioeconomic History    Marital status: Single     Spouse name: None    Number of children: None    Years of education: None    Highest education level: None   Occupational History    Occupation:    Tobacco Use    Smoking status: Never    Smokeless tobacco: Never   Vaping Use    Vaping Use: Never used   Substance and Sexual Activity    Alcohol use: No    Drug use: No    Sexual activity: Yes     Partners: Male     Comment: hx chlamydia, treated   Social History Narrative    Working on criminal justice degree        Lives alone with son        No pets        Hobbies: Personal Care     Social Determinants of Health     Financial Resource Strain: Low Risk     Difficulty of Paying Living Expenses: Not hard at all   Food Insecurity: No Food Insecurity    Worried About [address] in the Last Year: Never true    [address] [employer] in the Last Year: Never true       SCREENINGS             PHYSICAL EXAM    (up to 7 for level 4, 8 or more for level 5)     ED Triage Vitals [08/06/22 0731]   BP Temp Temp Source Heart Rate Resp SpO2 Height Weight   (!) 147/85 98.6 °F (37 °C) Oral 86 16 99 % 5' 8\" (1.727 m) 264 lb (119.7 kg)       Physical Exam  Vitals and nursing note reviewed. Constitutional:       General: She is not in acute distress. Appearance: Normal appearance. She is well-developed and normal weight. She is not ill-appearing, toxic-appearing or diaphoretic. HENT:      Head: Normocephalic and atraumatic. Nose: Nose normal. No congestion or rhinorrhea. Mouth/Throat:      Mouth: Mucous membranes are moist. No oral lesions. Pharynx: Oropharynx is clear. Posterior oropharyngeal erythema present. No pharyngeal swelling, oropharyngeal exudate or uvula swelling. cranial nerve deficit. Sensory: No sensory deficit. Motor: No weakness or abnormal muscle tone. Coordination: Coordination normal.      Gait: Gait normal.      Deep Tendon Reflexes: Reflexes are normal and symmetric. Reflexes normal.   Psychiatric:         Mood and Affect: Mood normal.         Behavior: Behavior normal.         Thought Content: Thought content normal.         Judgment: Judgment normal.       DIAGNOSTIC RESULTS     EKG: All EKG's are interpreted by the Emergency Department Physician who either signs or Co-signs this chart in the absence of a cardiologist.        RADIOLOGY:   Non-plain film images such as CT, Ultrasound and MRI are read by the radiologist. Maurice Dasen radiographicimages are visualized and preliminarily interpreted by the emergency physician with the below findings:        Interpretation per the Radiologist below, if available at the time of this note:    No orders to display         ED BEDSIDE ULTRASOUND:   Performed by ED Physician - none    LABS:  Labs Reviewed   COVID-19, RAPID   RAPID INFLUENZA A/B ANTIGENS   RAPID STREP SCREEN       All other labs were within normal range or not returned as of this dictation. EMERGENCY DEPARTMENT COURSE and DIFFERENTIALDIAGNOSIS/MDM:   Vitals:    Vitals:    08/06/22 0731   BP: (!) 147/85   Pulse: 86   Resp: 16   Temp: 98.6 °F (37 °C)   TempSrc: Oral   SpO2: 99%   Weight: 264 lb (119.7 kg)   Height: 5' 8\" (1.727 m)           MDM     Amount and/or Complexity of Data Reviewed  Clinical lab tests: reviewed and ordered    Risk of Complications, Morbidity, and/or Mortality  Presenting problems: moderate  Diagnostic procedures: moderate  Management options: low    Patient Progress  Patient progress: stable      CRITICAL CARE TIME   Total Critical Care time was  minutes, excluding separately reportable procedures.   There was a high probability of clinically significant/life threatening deterioration in the patient's condition which required my urgentintervention. CONSULTS:  None    PROCEDURES:  Unless otherwise noted below, none     Procedures    FINAL IMPRESSION      1.  Viral pharyngitis          DISPOSITION/PLAN   DISPOSITION Decision To Discharge 08/06/2022 08:10:56 AM      PATIENT REFERRED TO:  MD Zachary Jarrettolman  [phone]    In 3 days      DISCHARGE MEDICATIONS:  Discharge Medication List as of 8/6/2022  8:13 AM        START taking these medications    Details   Benzocaine-Menthol (CEPACOL) 15-2.3 MG LOZG Take 1 lozenge by mouth 4 times daily as needed (Sore throat), Disp-32 lozenge, R-0Normal                (Please note that portions of this note were completed with a voice recognitionprogram.  Efforts were made to edit the dictations but occasionally words are mis-transcribed.)    Latia Medina MD (electronically signed)  Attending Emergency Physician            Latia Medina MD  08/07/22 6799

## 2022-08-07 LAB — VITAMIN D 25-HYDROXY: 22.4 NG/ML

## 2022-08-10 ENCOUNTER — TELEPHONE (OUTPATIENT)
Dept: FAMILY MEDICINE CLINIC | Age: 27
End: 2022-08-10

## 2022-08-10 NOTE — TELEPHONE ENCOUNTER
----- Message from Eliza Sherwood sent at 8/10/2022  2:03 PM EDT -----  Subject: Message to Provider    QUESTIONS  Information for Provider? pt was referred to a bariatric doctor back in   July but was never contacted to make an appt. Dr. Lizeth Crouch told the pt that   if she hadn't heard from them in awhile to give the office a call. What   should he do?  ---------------------------------------------------------------------------  --------------  Paula PEREIRA  [phone]; OK to leave message on voicemail  ---------------------------------------------------------------------------  --------------  SCRIPT ANSWERS  Relationship to Patient?  Self

## 2022-08-11 DIAGNOSIS — E55.9 VITAMIN D DEFICIENCY: Primary | ICD-10-CM

## 2022-08-11 RX ORDER — ACETAMINOPHEN 160 MG
1 TABLET,DISINTEGRATING ORAL DAILY
Qty: 90 CAPSULE | Refills: 1 | Status: SHIPPED | OUTPATIENT
Start: 2022-08-11

## 2022-08-19 NOTE — RESULT ENCOUNTER NOTE
See [employer] message sent to patient below. Patient has not viewed message in the past week. Please call them and read message.

## 2022-08-31 ENCOUNTER — TELEPHONE (OUTPATIENT)
Dept: FAMILY MEDICINE CLINIC | Age: 27
End: 2022-08-31

## 2022-08-31 NOTE — TELEPHONE ENCOUNTER
----- Message from Deepak Ward sent at 8/29/2022  4:42 PM EDT -----  Subject: Message to Provider    QUESTIONS  Information for Provider? Pt was returning call for lab results.  Please   contact pt to advise,   ---------------------------------------------------------------------------  --------------  Alessia Frias INFO  [phone]; OK to leave message on voicemail  ---------------------------------------------------------------------------  --------------  SCRIPT ANSWERS  undefined

## 2022-09-08 ENCOUNTER — TELEPHONE (OUTPATIENT)
Dept: FAMILY MEDICINE CLINIC | Age: 27
End: 2022-09-08

## 2022-09-08 NOTE — TELEPHONE ENCOUNTER
----- Message from [address] sent at 9/2/2022  1:53 PM EDT -----  Subject: Referral Request    Reason for referral request? She would like to be referred back to the   [facility] clinic the other one in [facility] isn't opened yet  Provider patient wants to be referred to(if known):     Provider Phone Number(if known): Additional Information for Provider? Please contact pt when referral is   put in.  Thank you  ---------------------------------------------------------------------------  --------------  Tila Villalobos INFO    [phone]; OK to leave message on voicemail  ---------------------------------------------------------------------------  --------------

## 2022-09-13 ENCOUNTER — NURSE TRIAGE (OUTPATIENT)
Dept: OTHER | Facility: CLINIC | Age: 27
End: 2022-09-13

## 2022-09-13 ENCOUNTER — TELEPHONE (OUTPATIENT)
Dept: FAMILY MEDICINE CLINIC | Age: 27
End: 2022-09-13

## 2022-09-13 NOTE — TELEPHONE ENCOUNTER
Received call from Ying Don at [facility] with [employer]. Subjective: Caller states \"this has been happening for a month now after I eat\"     Current Symptoms: abdominal pain-middle, diarrhea     Onset: 1 month ago; worsening    Pain Severity: 8/10; cramping; waxing and waning    Temperature: denies     What has been tried: NA    LMP:  start of this month  Pregnant: No    Recommended disposition: See in Office Today    Care advice provided, patient verbalizes understanding; denies any other questions or concerns; instructed to call back for any new or worsening symptoms. Patient/Caller agrees with recommended disposition; writer provided warm transfer to Sulmaq at [facility] for appointment scheduling     Attention Provider: Thank you for allowing me to participate in the care of your patient. The patient was connected to triage in response to information provided to the ECC/PSC. Please do not respond through this encounter as the response is not directed to a shared pool.   Reason for Disposition   MODERATE pain (e.g., interferes with normal activities that comes and goes (cramps) lasts > 24 hours  (Exception: Pain with Vomiting or Diarrhea - see that Protocol.)    Protocols used: Abdominal Pain - Female-ADULT-[place]

## 2022-09-13 NOTE — TELEPHONE ENCOUNTER
Pt has a referral for lab work that needs to be completed, the location she was sent to in [facility] is not available. Could you refer her back to her original location for [place] in [facility].

## 2022-09-14 NOTE — TELEPHONE ENCOUNTER
Patient was referred to bariatric surgery, not gastroenterology. Dr. Daya King and [facility] program should be up and running now, please help patient arrange a visit.

## 2022-09-16 ENCOUNTER — OFFICE VISIT (OUTPATIENT)
Dept: FAMILY MEDICINE CLINIC | Age: 27
End: 2022-09-16
Payer: COMMERCIAL

## 2022-09-16 VITALS
HEART RATE: 60 BPM | OXYGEN SATURATION: 97 % | SYSTOLIC BLOOD PRESSURE: 124 MMHG | BODY MASS INDEX: 41.81 KG/M2 | DIASTOLIC BLOOD PRESSURE: 68 MMHG | WEIGHT: 275 LBS

## 2022-09-16 DIAGNOSIS — R19.5 MUCUS IN STOOL: ICD-10-CM

## 2022-09-16 DIAGNOSIS — R10.9 ABDOMINAL CRAMPING: Primary | ICD-10-CM

## 2022-09-16 DIAGNOSIS — Z23 NEED FOR VACCINATION: ICD-10-CM

## 2022-09-16 DIAGNOSIS — R19.4 CHANGE IN BOWEL HABITS: ICD-10-CM

## 2022-09-16 DIAGNOSIS — E01.0 THYROMEGALY: ICD-10-CM

## 2022-09-16 PROCEDURE — G8427 DOCREV CUR MEDS BY ELIG CLIN: HCPCS | Performed by: FAMILY MEDICINE

## 2022-09-16 PROCEDURE — 90677 PCV20 VACCINE IM: CPT | Performed by: FAMILY MEDICINE

## 2022-09-16 PROCEDURE — 99214 OFFICE O/P EST MOD 30 MIN: CPT | Performed by: FAMILY MEDICINE

## 2022-09-16 PROCEDURE — 90472 IMMUNIZATION ADMIN EACH ADD: CPT | Performed by: FAMILY MEDICINE

## 2022-09-16 PROCEDURE — 1036F TOBACCO NON-USER: CPT | Performed by: FAMILY MEDICINE

## 2022-09-16 PROCEDURE — G8417 CALC BMI ABV UP PARAM F/U: HCPCS | Performed by: FAMILY MEDICINE

## 2022-09-16 PROCEDURE — 90674 CCIIV4 VAC NO PRSV 0.5 ML IM: CPT | Performed by: FAMILY MEDICINE

## 2022-09-16 PROCEDURE — 90471 IMMUNIZATION ADMIN: CPT | Performed by: FAMILY MEDICINE

## 2022-09-16 ASSESSMENT — ENCOUNTER SYMPTOMS
CONSTIPATION: 0
VOMITING: 0
BLOOD IN STOOL: 0
ABDOMINAL DISTENTION: 0
CHEST TIGHTNESS: 0
ANAL BLEEDING: 0
NAUSEA: 0
SHORTNESS OF BREATH: 0
RECTAL PAIN: 0
DIARRHEA: 1

## 2022-09-16 NOTE — PROGRESS NOTES
After obtaining consent, and per orders of Dr. Negar Wagner, injection of Prevnar-20 and Fluelvax given in Left deltoid by Rossi Barboza MA. Patient instructed to remain in clinic for 20 minutes afterwards, and to report any adverse reaction to me immediately. Pt tolerated well.

## 2022-09-16 NOTE — PATIENT INSTRUCTIONS
diary. You can write down the foods you try and note how they make you feel. After a few weeks, you may have a better idea of what foods you should avoid and what foods you can eat without triggering IBS symptoms. What are the risks? There is some risk of not getting all of the vitamins and nutrients you need on the low-FODMAP diet. These include: Folate. Thiamin. Vitamin B6. Calcium. Vitamin D. Your dietitian or doctor can help you find other sources of these if needed. This diet may limit your fiber intake. If you need more fiber, ask your doctor or dietitian about low-FODMAP fiber sources. What foods are on the low-FODMAP diet? Here is a guide to foods that you can eat, plus the foods that you should avoid, when you are on the low-FODMAP diet. Grains  Okay to eat: Foods made from grains like arrowroot, buckwheat, cornmeal, millet, and oats. You can also eat potato flour, quinoa, rice, sorghum, tapioca, and teff. Cereals, pasta, breads, corn tortillas and baked goods made from these grains are also okay. (These grains may be labeled \"gluten-free. \")  Avoid: Grains like wheat, barley, and rye. Avoid ingredients such as bulgur, couscous, durum, and semolina. And avoid cereals, breads, and pastas made from these grains. Avoid chickpea, lentil, and pea flour. Proteins  Okay to eat: Most meat, fish, and eggs without high-FODMAP sauces. You can have small amounts of almonds or hazelnuts (10 nuts). Macadamia nuts, peanuts, pecans, pine nuts, and walnuts are also okay. You can also eat charlene and pumpkin seeds, tofu, and tempeh. Avoid: Beans, chickpeas, lentils, and soybeans. Avoid pistachio and cashew nuts. Avoid fatty or fried meats. And some sausages may have high-FODMAP ingredients. Dairy  Okay to eat: Lactose-free dairy milks. Rice milk and almond milk are okay. So are lactose-free yogurts, kefirs, ice creams, and sorbet from low-FODMAP fruits and sweeteners. (These are often labeled \"lactose-free. \") You can have small amounts (2 Tbsp) of cottage, cream, or ricotta cheese. Hard cheeses like cheddar, [place], ROSS, and Swiss are okay. So are small amounts (1 oz) of aged or ripened cheeses like Brie, blue, and feta. Avoid: Milk, including cow, goat, and sheep. Avoid condensed or evaporated milk, buttermilk, custard, cream, sour cream, yogurt, and ice cream. Avoid soy milk. (Check sauces for dairy ingredients.)  Vegetables  Okay to eat: Bamboo shoots, bell peppers, bok yajaira, broccoli, cabbage (red or white), and cucumbers. Eggplant, green beans, lettuce, olives, parsnips, and potatoes are okay to eat. So are pumpkin, rutabaga, seaweed, sprouts, Swiss chard, and spinach. You can eat scallions (green part only) and yellow or spaghetti squash. You can eat tomatoes, turnips, watercress, yams, and zucchini. You can also have small amounts of artichoke hearts (from can, 1 oz), carrots, corn (½ cob), and sweet potato (½ cup). Avoid: Artichokes, asparagus, [place] sprouts, wendy cabbage, cauliflower, and celery. And avoid garlic, leeks, mushrooms, okra, onions, scallions (white part), shallots, and peas. Fruits  Okay to eat: Bananas, blueberries, cantaloupe, grapes, and honeydew. Kiwi, hussein, limes, oranges, passion fruit, papaya, and pineapple are also okay. You can eat plantain, raspberries, rhubarb, star fruit, strawberries, tangelo, and tangerine. You can also have small amounts of dried banana chips (up to 10 chips), dried cranberries (1 Tbsp), and shredded coconut (up to ¼ cup). Avoid: Apples, applesauce, apricots, avocados, blackberries, boysenberries, and cherries. Also avoid dates, figs, grapefruit, guava, lychee, and mangoes. Don't eat nectarines, peaches, pears, persimmon, plums, prunes, tamarillo, or watermelon. And limit most canned and dried fruits. Oils, spices, condiments, and sweeteners  Okay to eat: Vegetable oils (including garlic infused), butter, ghee, lard, and margarine.  You can have most fresh herbs like basil, chives, coriander, hesham, parsley, rosemary, and thyme. You can have salt, jams made from low-FODMAP fruits, mayonnaise, and mustard. Soy sauce, hot sauce (no garlic), tamari, and vinegar are also okay. Sweeteners that are okay include sugar (sucrose), powdered (confectioner's) sugar, brown sugar, glucose, and maple syrup. You can also have some artificial sweeteners like aspartame, saccharine, and stevia. Avoid: Chutneys, hummus, jellies, garlic sauces, and gravies made with onion or garlic. Avoid pickles, relish, some salad dressings and soup stocks, salsa, and tomato paste. And avoid sauces and other foods with high fructose corn syrup, honey, molasses, and agave. Avoid artificial sweeteners (isomalt, mannitol, malitol, sorbitol, and xylitol). Avoid corn syrup solids, fructose, fruit juice concentrate, and polydextrose. Other foods and drinks  Okay to have: Water, soda water, tonic, soft drinks sweetened with sugar, ½ cup of low-FODMAP fruit juice, and most teas and alcohols. You can also eat foods made with baking powder and soda, cocoa, and gelatin. Avoid: Juices from high-FODMAP fruits and vegetables. And avoid fortified marlon, chamomile and fennel teas, chicory-based drinks and coffee substitutes, and bouillon cubes. Follow-up care is a key part of your treatment and safety. Be sure to make and go to all appointments, and call your doctor if you are having problems. It's also a good idea to know your test results and keep a list of the medicines you take. Current as of: October 6, 2021               Content Version: 13.4  © 2006-2022 [employer]. Care instructions adapted under license by [facility] ([facility]). If you have questions about a medical condition or this instruction, always ask your healthcare professional. [place] [id number] any warranty or liability for your use of this information.

## 2022-09-16 NOTE — PROGRESS NOTES
Jessica Johnson (: 1995) is a 24 y.o. female, Established patient, who presents today for:    Chief Complaint   Patient presents with    Abdominal Cramping     Relief after bowel movements         ASSESSMENT/PLAN    1. Abdominal cramping  Comments:  Consider IBS based on history, but with reported mucus in stool will refer to GI for further evaluation of IBD. Pt will try lower FODMAP diet and obtain labs. Orders:  -     CBC with Auto Differential; Future  -     Comprehensive Metabolic Panel; Future  -     6000 Leeanne Castro MD, Gastroenterology, [place]  2. Change in bowel habits  Comments:  See above. Likely functional bowel issue, but will refer to GI to evaluate for potential IBD. Orders:  -     CBC with Auto Differential; Future  -     Comprehensive Metabolic Panel; Future  -     6000 Leeanne Castro MD, Gastroenterology, [place]  3. Mucus in stool  Comments:  See above  Orders:  -     CBC with Auto Differential; Future  -     Comprehensive Metabolic Panel; Future  -     6000 Leeanne Castro MD, Gastroenterology, [place]  4. Thyromegaly  Comments:  Noted incidentally on exam. With reported bowel issues will further evaluate with labs and U/S  Orders:  -     TSH; Future  -     T4, Free; Future  -     Thyroid Peroxidase Antibody; Future  -     US HEAD NECK SOFT TISSUE THYROID; Future  5. Need for vaccination  -     Influenza, FLUCELVAX, (age 10 mo+), IM, Preservative Free, 0.5 mL  -     Pneumococcal, PCV20, PREVNAR 20, (age 25 yrs+), IM, PF    Return for F/U in 1-2 Months - after seen by GI. SUBJECTIVE/OBJECTIVE:    HPI    Patient presents for acute visit regarding abdominal cramping and change in bowel habits. She reports 1 month history of of lower abdominal cramping and sensation of abdominal bloating noted with all meals that is relieved with bowel movements that are sometimes normal, but usually more loose overall, and at times liquid diarrhea.  The type of food consumed has not made a difference. Patient reports similar symptoms with eating more bland foods and more rich/heavy foods. There is no reported rectal bleeding, red blood in the stool, or melena, but patient reports occasionally noting mucus in BM's. There are no reported similar symptoms in the past. Patient denies any change in appetite, weight changes, dysphagia, early satiety, increased belching, sour brash/heartburn, nausea/vomiting, or constipation. Patient denies any change to her baseline diet and denies any recent travel. She denies any close sick contacts at home with similar symptoms. She admits to increased stress over the past 1-2 months with restarting school and juggling school, work, and home responsibilities. Current Outpatient Medications on File Prior to Visit   Medication Sig Dispense Refill    Alcohol Swabs PADS DX: diabetes mellitus. Test 1 time(s) daily - Ok to substitute per insurance (1 each = 1 box) 100 each 5    Cholecalciferol (VITAMIN D3) 50 MCG (2000 UT) CAPS Take 1 capsule by mouth in the morning. (Patient not taking: Reported on 9/16/2022) 90 capsule 1    Insulin Pen Needle (B-D ULTRAFINE III SHORT PEN) 31G X 8 MM MISC 1 each by Does not apply route daily (Patient not taking: No sig reported) 100 each 0    blood glucose monitor kit and supplies DX: diabetes mellitus. Test 1 time(s) daily - Ok to substitute per insurance (Patient not taking: Reported on 9/16/2022) 1 kit 0    blood glucose monitor strips DX: diabetes mellitus. Use 1 time(s) daily - Ok to substitute per insurance (Patient not taking: Reported on 9/16/2022) 100 strip 5    Lancets MISC DX: diabetes mellitus.  Use 1 time(s) daily - Ok to substitute per insurance (1 each = 1 box) (Patient not taking: Reported on 9/16/2022) 100 each 5     Current Facility-Administered Medications on File Prior to Visit   Medication Dose Route Frequency Provider Last Rate Last Admin    lactated ringers infusion   IntraVENous Continuous Allison Sutherland MD sodium chloride flush 0.9 % injection 10 mL  10 mL IntraVENous 2 times per day Jerri Stewart MD        sodium chloride flush 0.9 % injection 10 mL  10 mL IntraVENous PRN Nadege Castro MD        nalbuphine (NUBAIN) injection 10 mg  10 mg IntraVENous Q2H PRN Nadege Castro MD        oxytocin (PITOCIN) 30 units in 500 mL infusion  1 israel-units/min IntraVENous Continuous Nadege Castro MD        simethicone (MYLICON) chewable tablet 80 mg  80 mg Oral Q6H PRN Nadege Castro MD        docusate sodium (COLACE) capsule 100 mg  100 mg Oral BID Nadege Castro MD        magnesium hydroxide (MILK OF MAGNESIA) 400 MG/5ML suspension 30 mL  30 mL Oral Daily PRN Nadege Castro MD        bisacodyl (DULCOLAX) suppository 10 mg  10 mg Rectal Daily PRN Nadege Castro MD        ondansetron (ZOFRAN) injection 4 mg  4 mg IntraVENous Q6H PRN Nadege Castro MD        famotidine (PEPCID) injection 20 mg  20 mg IntraVENous BID Nadege Castro MD        oxytocin (PITOCIN) 30 units in 500 mL infusion  1 israel-units/min IntraVENous Continuous PRN Nadege Castro MD           Allergies   Allergen Reactions    Tomato Rash        Review of Systems   Constitutional:  Negative for appetite change, chills, diaphoresis, fatigue, fever and unexpected weight change. Respiratory:  Negative for chest tightness and shortness of breath. Cardiovascular:  Negative for chest pain, palpitations and leg swelling. Gastrointestinal:  Positive for diarrhea (intermittent loose stools). Negative for abdominal distention (abdominal bloating), anal bleeding, blood in stool, constipation, nausea, rectal pain and vomiting. Abdominal pain: abdominal cramping. No heartburn, No melena   Endocrine: Negative for cold intolerance, heat intolerance, polydipsia, polyphagia and polyuria. Skin:  Negative for rash. Neurological:  Negative for syncope and light-headedness.      Vitals:  /68 (Site: Right Upper Arm, Position: Sitting, Cuff Size: Large Adult) Pulse 60   Wt 275 lb (124.7 kg)   SpO2 97%   BMI 41.81 kg/m²     Physical Exam  Vitals reviewed. Constitutional:       General: She is not in acute distress. Appearance: She is not ill-appearing. Eyes:      General: No scleral icterus. Neck:      Thyroid: Thyromegaly (symmetrical) present. No thyroid mass or thyroid tenderness. Vascular: No carotid bruit. Cardiovascular:      Rate and Rhythm: Normal rate and regular rhythm. Heart sounds: Normal heart sounds. No murmur heard. Pulmonary:      Effort: Pulmonary effort is normal. No respiratory distress. Breath sounds: Normal breath sounds. No wheezing, rhonchi or rales. Abdominal:      General: Bowel sounds are normal. There is no distension. Palpations: Abdomen is soft. Tenderness: There is no abdominal tenderness. There is no right CVA tenderness, left CVA tenderness, guarding or rebound. Musculoskeletal:      Cervical back: Neck supple. Right lower leg: No edema. Left lower leg: No edema. Lymphadenopathy:      Cervical: No cervical adenopathy. Skin:     Findings: No rash. Neurological:      Mental Status: She is alert and oriented to person, place, and time. Psychiatric:         Mood and Affect: Mood normal.         Behavior: Behavior normal.         Thought Content: Thought content normal.       Ortho Exam (If Applicable)              An electronic signature was used to authenticate this note.      Jatinder Queen MD

## 2022-10-24 ENCOUNTER — HOSPITAL ENCOUNTER (OUTPATIENT)
Dept: ULTRASOUND IMAGING | Age: 27
Discharge: HOME | End: 2022-10-26
Payer: COMMERCIAL

## 2022-10-24 DIAGNOSIS — E01.0 THYROMEGALY: ICD-10-CM

## 2022-10-24 PROCEDURE — 76536 US EXAM OF HEAD AND NECK: CPT

## 2022-10-26 ENCOUNTER — OFFICE VISIT (OUTPATIENT)
Dept: BARIATRICS/WEIGHT MGMT | Age: 27
End: 2022-10-26
Payer: COMMERCIAL

## 2022-10-26 VITALS
WEIGHT: 280 LBS | SYSTOLIC BLOOD PRESSURE: 126 MMHG | OXYGEN SATURATION: 99 % | HEART RATE: 87 BPM | DIASTOLIC BLOOD PRESSURE: 82 MMHG | HEIGHT: 68 IN | BODY MASS INDEX: 42.44 KG/M2

## 2022-10-26 DIAGNOSIS — E66.9 DIABETES MELLITUS TYPE 2 IN OBESE (HCC): ICD-10-CM

## 2022-10-26 DIAGNOSIS — E11.69 DIABETES MELLITUS TYPE 2 IN OBESE (HCC): ICD-10-CM

## 2022-10-26 DIAGNOSIS — G47.33 OBSTRUCTIVE SLEEP APNEA: ICD-10-CM

## 2022-10-26 DIAGNOSIS — E66.01 MORBID OBESITY (HCC): Primary | ICD-10-CM

## 2022-10-26 PROCEDURE — G8417 CALC BMI ABV UP PARAM F/U: HCPCS | Performed by: SURGERY

## 2022-10-26 PROCEDURE — 99204 OFFICE O/P NEW MOD 45 MIN: CPT | Performed by: SURGERY

## 2022-10-26 PROCEDURE — G8482 FLU IMMUNIZE ORDER/ADMIN: HCPCS | Performed by: SURGERY

## 2022-10-26 PROCEDURE — 3044F HG A1C LEVEL LT 7.0%: CPT | Performed by: SURGERY

## 2022-10-26 PROCEDURE — G8427 DOCREV CUR MEDS BY ELIG CLIN: HCPCS | Performed by: SURGERY

## 2022-10-26 PROCEDURE — 2022F DILAT RTA XM EVC RTNOPTHY: CPT | Performed by: SURGERY

## 2022-10-26 PROCEDURE — 1036F TOBACCO NON-USER: CPT | Performed by: SURGERY

## 2022-10-26 NOTE — PATIENT INSTRUCTIONS
Make the following appointments:    Joana Owen. Psychologist, Dr. Chris Kinsey. Schedule screening upper endoscopy with Dr. Mildred Perry. No aspirin, ibuprofen or other non-steroidal anti-inflammatory drugs (NSAIDs) 7 days prior to surgery    No food or drink six hours prior to surgery. Sleep Medicine referral for Sleep Apnea assessment and treatment. THE BIG FOUR RULES:  1. Count calories! People count calories eat less. Use the daily plate or other apps for your smart phone or computer. The more you count the more of an expert you will become and will get easier and easier. If you are trying to lose weight and exercising a lot, keep calories to around the thousand to 1200 a day. If you are not exercising consistently try to limit them to around 800 a day. 2.  Separate liquids and solids. Wait 30 minutes to an hour after you eat before drinking liquids. This will reduce the total amount of food you eat in the meal.    3.  Exercise! You need up to 5 hours a week with a combination of cardio and resistance or weight training in order to keep excess body fat off.    4.  Sleep! Try to get 7 or more hours of sleep a night. If you have obstructive sleep apnea definitely use your CPAP machine. THE RULE OF 20'S:  For every meal, chew each bite 20 seconds. Then put the fork down and wait 20 seconds after you swallow before picking up the fork again. Each meal should take at least 20 minutes so your brain can register that you are full.

## 2022-10-26 NOTE — PROGRESS NOTES
Surgery Consultation    Patient Name:  :  Hospital Day: Maria Alejandra Galvan 1995 [unfilled]     Date of Service: 10/26/2022    Subjective:      History of Present Illness:    Maria Alejandra Galvan  is a 24 y.o. female referred by Dr. Marta Luther for morbid obesity and diabetes. Maria Alejandra Galvan reports multiple episodes of weight loss and regain after multiple diet and exercise programs. She was diagnosed with diabetes in her early teens and treated with metformin. She reports not currently using medication or checking her sugars, but a recent HgA1c was 6.0. She denies nicotine or alcohol use. Marilyn Cooper does kick boxing for exercise and works as a pre-.     Past Medical History:   Diagnosis Date    Depression 2021    Diet controlled gestational diabetes mellitus (GDM), antepartum 10/25/2017    History of 2019 novel coronavirus disease (COVID-19) 2021    DX 10/2021 @ [facility]    Hyperemesis complicating pregnancy, antepartum 2017    Low HDL (under 40) 2021    Morbid obesity ([address] 2021    Type 2 diabetes mellitus in pregnancy     Past Surgical History:   Procedure Laterality Date    TONSILLECTOMY  2015        Family History   Problem Relation Age of Onset    Hypertension Mother     Asthma Mother     High Cholesterol Father     Hypertension Father     Diabetes Father     Birth Defects Sister     Mental Retardation Sister     Early Death Sister     Diabetes Maternal Grandmother     Diabetes Paternal Grandmother     No Known Problems Son     Social History     Tobacco Use    Smoking status: Never    Smokeless tobacco: Never   Vaping Use    Vaping Use: Never used   Substance Use Topics    Alcohol use: No    Drug use: No        Allergies: Tomato    Review of Systems  Review of Systems - History obtained from the patient  General ROS: negative for - fever, malaise, or weight loss  ENT ROS: negative for - headaches, nasal congestion, or sore throat  Hematological and Lymphatic ROS: No bruising or easy bleeding. Respiratory ROS: no cough, shortness of breath, or wheezing  Cardiovascular ROS: no chest pain or dyspnea on exertion  Gastrointestinal ROS: Negative for abdominal pain, distention, hematochezia, melena, nausea, vomiting. Genito-Urinary ROS: no dysuria, trouble voiding, or hematuria  Musculoskeletal ROS: negative  Neurological ROS: negative       Objective:      Vital Signs:  /82 (Site: Right Upper Arm, Position: Sitting, Cuff Size: Large Adult)   Pulse 87   Ht 5' 8\" (1.727 m)   Wt 280 lb (127 kg)   LMP 10/25/2022 (Exact Date)   SpO2 99%   Breastfeeding No   BMI 42.57 kg/m²     No intake or output data in the 24 hours ending 10/26/22 1345    Physical Exam  General: No acute distress  HEENT: P PERRLA, no scleral icterus. Trachea midline. Thoracic: Clear to auscultation bilaterally. Cardiac: Regular rate and rhythm with no rubs clicks or murmurs. Abdomen: Obese, nontender. No fluid shift or caput medusae. No hepatosplenomegaly. Extremities: No clubbing cyanosis or edema. Neurologic: No gross motor or sensory defects. Labs Reviewed:  Lab Results   Component Value Date/Time     08/06/2022 11:35 AM    K 4.0 08/06/2022 11:35 AM     08/06/2022 11:35 AM    CO2 22 08/06/2022 11:35 AM    LABCREA 235.7 08/06/2022 11:35 AM    AST 8 08/06/2022 11:35 AM    ALT 17 08/06/2022 11:35 AM    TSH 0.881 05/17/2021 11:57 AM     Lab Results   Component Value Date    HGB 12.1 08/06/2022    HCT 36.8 (L) 08/06/2022     Lab Results   Component Value Date/Time    INR 0.9 05/19/2017 12:45 AM     Radiology Reviewed:  [unfilled]     Diagnosis:      1. Morbid obesity ([address]    2. Obstructive sleep apnea    3. Diabetes mellitus type 2 in [facility])        Assessment/Plan:            Se Dorado  is a 24 y.o. female with   1. Morbid obesity ([address]    2. Obstructive sleep apnea    3.  Diabetes mellitus type 2 in obese [facility])       who reports multiple episodes of repeat weight-loss and re-gain with various programmatic weight loss programs such as Weight Watchers, Prudencio Bold, and TOPS. We discussed that the most effective treatment for morbid obesity is bariatric surgery. We discussed the risks and benefits of laparoscopic or robotic Hector-en-Y gastric bypass. We discussed that over 80% of patients undergoing gastric bypass are able to maintain anywhere from half to all of her excess weight off and studies that exceed 25 years. We discussed that there are risks for infection, bleeding, pain, need for reoperation, and vitamin and mineral deficiencies associated with surgery. We discussed that nicotine use is contraindicated after gastric bypass due to the risk of marginal ulcer formation. We also discussed the risks and benefits of laparoscopic or robotic sleeve gastrectomy. We discussed that most sleeve patients lose about 60 to 70% of their excess weight in a year and that review of multiple studies of 7 years in length reveal an average of 27% of sleeve patients regaining their lost weight and 19% requiring revision either for lack of weight loss or severe GERD. We also discussed the risks and benefits of screening upper endoscopy to assess the degree of esophagitis, possible Burroughs's esophagitis,dysplasia, gastrointestinal stromal tumors, or H. Pylori infection prior to surgery. We discussed that there are risk for injury to the oropharynx, esophagus, stomach, and duodenum which include perforation and need for operative repair. The patient consents to the endoscopic procedure. We will follow-up with referrals to our bariatric dietitian and to our bariatric psychologist.  We will need a preoperative history and physical within 30 days of revision to gastric bypass. We will also refer to Sleep Medicine to determine if there is sleep apnea and to begin treatment if present.     More than 60 minutes were spent in face-to-face interaction with patient majority of which were

## 2022-10-31 ENCOUNTER — NURSE ONLY (OUTPATIENT)
Dept: BARIATRICS/WEIGHT MGMT | Age: 27
End: 2022-10-31

## 2022-10-31 VITALS — HEIGHT: 68 IN | WEIGHT: 278 LBS | BODY MASS INDEX: 42.13 KG/M2

## 2022-10-31 NOTE — PROGRESS NOTES
[facility]  Initial Nutrition Consultation    Today's Date: 10/31/2022  Patients Name:Jumana Lucero     Height: 5'8\"    Weight:278.9 lbs    IBW: 140 lbs  %IBW: 200%  Excess Weight: 139 lbs  BMI: 42     Subjective Information:   Patient has tried multiple means of weight loss including diet and exercise in the past and has been unable to maintain a healthy weight. Pt states he / she has tried the following <1000kcal diet. Patients overall goal is to be able to lose weight with diet and exercise by changing lifestyle, habits and maintain a healthy weight for a lifetime      Most successful diet in the past? VLCD and daily exercise  Length of time patient was on the diet listed above? 2 years  Weight lost on the diet listed above? 60 lbs  Patient stated he / she maintained his / her weight for the following time? 4 years    Patient takes the following vitamins, minerals and dietary supplements? Hair, skin, nails supplement    Patient consumes the following beverage daily? 1 cup coffee, 6 cups water    Patient states he / she has the following problems:  no - Eating  no - Chewing  no - Swallowing  no - Nausea  no- Vomiting  no - Diarrhea  no - Constipation  no - Hair Changes  no- Missing Teeth  no- Wears Dentures  Food Allergies: no  -  Food Intolerances: no -     Pt identifies his / her eating habits as the following: Patient usually consumes only 2 meals/day however recently started including a protein shake as 3rd meal. Intake high in fat, fried foods, low fruit and vegetable intake. Dining out: Patient was dining out/fast food 3 x/week however has reduced to <  2 times/week  Emotional eating: no    Exercise: Patient has a membership to the gym, going 1-2x/week, likes to kick box but stopped about a year ago d/t finances. Yes - Past medically assisted weight loss history reviewed.   Yes - Provided education regarding the basic role of nutrition in junction with Bariatric Surgery. Yes - Provided overview of required dietary and behavioral changes pre and post operatively. Yes - Stated / reinforced that changes in dietary behaviors are critical to successful, long term weight Loss. Patient is aware that in order to proceed with bariatric surgery he / she will need to begin making lifestyle changes and is encouraged to lose weight prior to surgery. Patient is aware that bariatric surgery is a lifetime change that will require the patient to follow a low-fat, low-calorie, low-sugar, portion controlled diet with at least 60 minutes of physical activity daily. Patient is aware that certain foods may not be tolerated after bariatric surgery and certain foods will need avoided all together. Pt is aware supplementation of certain micro nutrients is lifelong along with the use of tracking both macro and micro nutrient intake daily after weight loss surgery. Pt is part of a comprehensive surgical weight loss program that is educating the patient on proper food choices, meal planning, portion control and label reading for use both before and after weight loss surgery. Pt is able to set measurable attainable nutrition goals that reinforce desired post-operative eating patterns when it comes to family, home based, or work settings. Mindful eating skills are being developed by the patient and how to identify the patients sense of hunger and fullness are being addressed. RDN LD feels that this patient knowledge / readiness to make appropriate diet / lifestyle changes assessed to be? - Good    KRISTINEN LD feels this patients expected adherence for post surgical diet? - Good    Patient was instructed on recommended dietary changes needed prior to surgery. Pre-Weight Loss Surgery behavior change goal sheet provided.      Goals:Eat 5-6x/day  Include protein at each meal and consume protein first  Sample protein supplements  Purchase recommended vitamins  Begin structured exercise, at least 3x/week with goal 5x/week 60 min. Comments: Patient is able to verbalize diet concepts for bariatric surgery. Patient is aware diet concepts will be reinforced at monthly pre-op weight loss counseling appointments. NICK JIMENEZ will monitor progress.

## 2022-11-09 NOTE — RESULT ENCOUNTER NOTE
See [employer] message sent to patient below. Patient has not viewed message in the past week. Please call them and read message.

## 2022-11-10 ENCOUNTER — OFFICE VISIT (OUTPATIENT)
Dept: PULMONOLOGY | Age: 27
End: 2022-11-10
Payer: COMMERCIAL

## 2022-11-10 VITALS
OXYGEN SATURATION: 98 % | DIASTOLIC BLOOD PRESSURE: 78 MMHG | HEART RATE: 78 BPM | WEIGHT: 269 LBS | BODY MASS INDEX: 40.77 KG/M2 | TEMPERATURE: 97.6 F | SYSTOLIC BLOOD PRESSURE: 122 MMHG | HEIGHT: 68 IN

## 2022-11-10 DIAGNOSIS — G47.33 OSA (OBSTRUCTIVE SLEEP APNEA): ICD-10-CM

## 2022-11-10 DIAGNOSIS — E66.9 OBESITY, UNSPECIFIED CLASSIFICATION, UNSPECIFIED OBESITY TYPE, UNSPECIFIED WHETHER SERIOUS COMORBIDITY PRESENT: Primary | ICD-10-CM

## 2022-11-10 DIAGNOSIS — R06.02 SHORTNESS OF BREATH: ICD-10-CM

## 2022-11-10 PROCEDURE — G8427 DOCREV CUR MEDS BY ELIG CLIN: HCPCS | Performed by: INTERNAL MEDICINE

## 2022-11-10 PROCEDURE — 99203 OFFICE O/P NEW LOW 30 MIN: CPT | Performed by: INTERNAL MEDICINE

## 2022-11-10 PROCEDURE — G8482 FLU IMMUNIZE ORDER/ADMIN: HCPCS | Performed by: INTERNAL MEDICINE

## 2022-11-10 PROCEDURE — G8417 CALC BMI ABV UP PARAM F/U: HCPCS | Performed by: INTERNAL MEDICINE

## 2022-11-10 PROCEDURE — 1036F TOBACCO NON-USER: CPT | Performed by: INTERNAL MEDICINE

## 2022-11-10 NOTE — PROGRESS NOTES
NEW PATIENT VISIT-PULMONARY/SLEEP    11/10/2022     REFERRING PHYSICIAN:  Aure Morgan MD     REASON FOR REFERRAL:  Bariatric clearance     HPI:     Moira Peña is a 24 y.o. female who was referred to sleep and pulmonary clinic for evaluation. She is being evaluated for weight loss surgery. She has been seen by bariatric surgeon already. Sleep:   Patient snores, not clear about witnessed apnea. Bed time: 930 PM, wake up time: 530 A. Patient falls asleep in few minutes. Patient wakes up 1-2 times during the night. Patient does not wake up refreshed. Patient feels sleepy during the day but she does not have the time to take a nap. She denies any accidents or near misses. He has some mild shortness of breath with activities like climbing stairs and walking briskly. She denies any cough. Has not been on inhalers in the past.          Past Medical History   Past Medical History:   Diagnosis Date    Depression 6/29/2021    Diet controlled gestational diabetes mellitus (GDM), antepartum 10/25/2017    History of 2019 novel coronavirus disease (COVID-19) 12/17/2021    [facility]    Hyperemesis complicating pregnancy, antepartum 01/20/2017    Low HDL (under 40) 5/25/2021    Morbid obesity ([address] 5/17/2021    Type 2 diabetes mellitus in pregnancy        Past Surgical History  Past Surgical History:   Procedure Laterality Date    TONSILLECTOMY  2015       Allergies  Allergies   Allergen Reactions    Tomato Rash       Medications  Current Outpatient Medications   Medication Sig Dispense Refill    Cholecalciferol (VITAMIN D3) 50 MCG (2000 UT) CAPS Take 1 capsule by mouth in the morning. 90 capsule 1     No current facility-administered medications for this visit.      Facility-Administered Medications Ordered in Other Visits   Medication Dose Route Frequency Provider Last Rate Last Admin    lactated ringers infusion   IntraVENous Continuous Nadege Castro MD        sodium chloride flush 0.9 % injection 10 mL  10 mL IntraVENous 2 times per day Nadege Castro MD        sodium chloride flush 0.9 % injection 10 mL  10 mL IntraVENous PRN Nadege Castro MD        nalbuphine (NUBAIN) injection 10 mg  10 mg IntraVENous Q2H PRN Nadege Castro MD        oxytocin (PITOCIN) 30 units in 500 mL infusion  1 israel-units/min IntraVENous Continuous Nadege Castro MD        simethicone (MYLICON) chewable tablet 80 mg  80 mg Oral Q6H PRN Nadege Castro MD        docusate sodium (COLACE) capsule 100 mg  100 mg Oral BID Nadege Castro MD        magnesium hydroxide (MILK OF MAGNESIA) 400 MG/5ML suspension 30 mL  30 mL Oral Daily PRN Nadege Castro MD        bisacodyl (DULCOLAX) suppository 10 mg  10 mg Rectal Daily PRN Nadege Castro MD        ondansetron (ZOFRAN) injection 4 mg  4 mg IntraVENous Q6H PRN Nadege Castro MD        famotidine (PEPCID) injection 20 mg  20 mg IntraVENous BID Nadege Castro MD        oxytocin (PITOCIN) 30 units in 500 mL infusion  1 israel-units/min IntraVENous Continuous PRN Katharine Lauren MD           Social History  Social History     Tobacco Use    Smoking status: Never    Smokeless tobacco: Never   Substance Use Topics    Alcohol use: No       Family History  Family History   Problem Relation Age of Onset    Hypertension Mother     Asthma Mother     High Cholesterol Father     Hypertension Father     Diabetes Father     Birth Defects Sister     Mental Retardation Sister     Early Death Sister     Diabetes Maternal Grandmother     Diabetes Paternal Grandmother     No Known Problems Son        Review of Systems  All review of systems has been obtained and negative other than what was mentionedin HPI. Physical Exam                 Vitals:    11/10/22 0911   BP: 122/78   Pulse: 78   Temp: 97.6 °F (36.4 °C)   TempSrc: Tympanic   SpO2: 98%   Weight: 269 lb (122 kg)   Height: 5' 8\" (1.727 m)          General appearance: Well appearing. No acute distress.  AAOX3  Head: Normocephalic, without obvious abnormality, atraumatic   Eyes:Pupils bilateral equal and reactive, EOM intact. Normal sclera and conjunctiva   Nose: Mucosa pink  Throat: Clear,  [place] 3  Neck: Supple, No JVD. Nothyromegaly. Neck is thick  Lungs: Clear bilaterally. No wheezing. No crackles. No use of accessory muscles. Heart: RRR, S1, S2 normal, no murmur, click, rub or gallop   Abdomen: soft, non-tender, nondistended. Bowel sounds normal. No hernia. No organomegaly. Extremities: extremities normal, atraumatic, no cyanosis, no edema  Skin: Skin color, texture, turgor normal. No rashes or lesions   Neurological: No focal deficits,cranial nerves grossly intact. No weakness. Sensation normal   Psych: Normal Mood  Musculoskeletal: No joint abnormalities. Impression:   Diagnosis Orders   1. Obesity, unspecified classification, unspecified obesity type, unspecified whether serious comorbidity present        2. Shortness of breath        3. TANIA (obstructive sleep apnea)  Home Sleep Study              Recommendations:     -Her shortness of breath is likely due to deconditioning. Weight loss would probably help with that. -Proceed with a home sleep study.  -She will be at mild risk for postoperative respiratory complications.  -Weight loss and exercise has been advised. Return in about 4 weeks (around 12/8/2022). Phone visit to discuss results.        Electronically signed by Saroj Hook MD on 11/10/2022 at 9:53 AM

## 2022-11-25 ENCOUNTER — OFFICE VISIT (OUTPATIENT)
Dept: GASTROENTEROLOGY | Age: 27
End: 2022-11-25
Payer: COMMERCIAL

## 2022-11-25 VITALS — OXYGEN SATURATION: 99 % | HEART RATE: 83 BPM | BODY MASS INDEX: 42.13 KG/M2 | WEIGHT: 278 LBS | HEIGHT: 68 IN

## 2022-11-25 DIAGNOSIS — K58.0 IRRITABLE BOWEL SYNDROME WITH DIARRHEA: Primary | ICD-10-CM

## 2022-11-25 PROCEDURE — 1036F TOBACCO NON-USER: CPT | Performed by: SPECIALIST

## 2022-11-25 PROCEDURE — G8417 CALC BMI ABV UP PARAM F/U: HCPCS | Performed by: SPECIALIST

## 2022-11-25 PROCEDURE — G8482 FLU IMMUNIZE ORDER/ADMIN: HCPCS | Performed by: SPECIALIST

## 2022-11-25 PROCEDURE — 99203 OFFICE O/P NEW LOW 30 MIN: CPT | Performed by: SPECIALIST

## 2022-11-25 PROCEDURE — G8427 DOCREV CUR MEDS BY ELIG CLIN: HCPCS | Performed by: SPECIALIST

## 2022-11-25 RX ORDER — DICYCLOMINE HYDROCHLORIDE 10 MG/1
10 CAPSULE ORAL 4 TIMES DAILY
Qty: 30 CAPSULE | Refills: 3 | Status: SHIPPED | OUTPATIENT
Start: 2022-11-25

## 2022-11-25 ASSESSMENT — ENCOUNTER SYMPTOMS
VOMITING: 0
CONSTIPATION: 0
BLOOD IN STOOL: 0
RECTAL PAIN: 0
ANAL BLEEDING: 0
EYES NEGATIVE: 1
DIARRHEA: 1
ABDOMINAL PAIN: 1
RESPIRATORY NEGATIVE: 1
ABDOMINAL DISTENTION: 0
NAUSEA: 0

## 2022-11-25 NOTE — PROGRESS NOTES
Gastroenterology Clinic Visit    Jeffery Edwards  [id number]  Chief Complaint   Patient presents with    New Patient     Patient is here today because she has been having abdominal pain and cramping. Patient also reports having mucous in her stool for the past few months. HPI: 24 y.o. female presents to the clinic with history of crampy lower abdominal pain since last 3 months. Pain is partly relieved by having a bowel movement. Symptoms are intermittent. Still occasional change in stool consistency and stool becomes watery. No blood in the stool has occasional mucus. No nausea or emesis. No history of travel outside the country. Patient is trying to lose weight voluntarily. Patient does wake up from sleep with abdominal cramps. No change in menstrual cycle or heavy periods. No family history of IBD. No history of fever experience chills. No history of antibiotics use prior to the onset of symptoms. Social history does not smoke drink alcohol , patient is not on any medications currently. Surgical history includes tonsillectomy    Review of Systems   Constitutional: Negative. HENT: Negative. Eyes: Negative. Respiratory: Negative. Cardiovascular: Negative. Gastrointestinal:  Positive for abdominal pain and diarrhea. Negative for abdominal distention, anal bleeding, blood in stool, constipation, nausea, rectal pain and vomiting. Endocrine: Negative. Genitourinary: Negative. Musculoskeletal: Negative. Skin: Negative. Allergic/Immunologic: Negative for food allergies. Neurological: Negative. Hematological: Negative. Psychiatric/Behavioral: Negative.         Past Medical History:   Diagnosis Date    Depression 6/29/2021    Diet controlled gestational diabetes mellitus (GDM), antepartum 10/25/2017    History of 2019 novel coronavirus disease (COVID-19) 12/17/2021    DX 10/2021 @ [facility]    Hyperemesis complicating pregnancy, antepartum 01/20/2017    Low HDL (under 40) 5/25/2021    Morbid obesity ([address] 5/17/2021    Type 2 diabetes mellitus in pregnancy       Past Surgical History:   Procedure Laterality Date    TONSILLECTOMY  2015     No current outpatient medications on file prior to visit.      Current Facility-Administered Medications on File Prior to Visit   Medication Dose Route Frequency Provider Last Rate Last Admin    lactated ringers infusion   IntraVENous Continuous Nadege Castro MD        sodium chloride flush 0.9 % injection 10 mL  10 mL IntraVENous 2 times per day Nadege Castro MD        sodium chloride flush 0.9 % injection 10 mL  10 mL IntraVENous PRN Nadege Castro MD        nalbuphine (NUBAIN) injection 10 mg  10 mg IntraVENous Q2H PRN Nadege Castro MD        oxytocin (PITOCIN) 30 units in 500 mL infusion  1 israel-units/min IntraVENous Continuous Nadege Castro MD        simethicone (MYLICON) chewable tablet 80 mg  80 mg Oral Q6H PRN Nadege Castro MD        docusate sodium (COLACE) capsule 100 mg  100 mg Oral BID Nadege Castro MD        magnesium hydroxide (MILK OF MAGNESIA) 400 MG/5ML suspension 30 mL  30 mL Oral Daily PRN Nadege Castro MD        bisacodyl (DULCOLAX) suppository 10 mg  10 mg Rectal Daily PRN Nadege Castro MD        ondansetron (ZOFRAN) injection 4 mg  4 mg IntraVENous Q6H PRN Nadege Castro MD        famotidine (PEPCID) injection 20 mg  20 mg IntraVENous BID Nadege Castro MD        oxytocin (PITOCIN) 30 units in 500 mL infusion  1 israel-units/min IntraVENous Continuous PRN Michi Bell MD         Family History   Problem Relation Age of Onset    Hypertension Mother     Asthma Mother     High Cholesterol Father     Hypertension Father     Diabetes Father     Birth Defects Sister     Mental Retardation Sister     Early Death Sister     Diabetes Maternal Grandmother     Diabetes Paternal Grandmother     No Known Problems Son       Social History     Socioeconomic History    Marital status: Single     Spouse name: None    Number of children: None    Years of education: None    Highest education level: None   Occupational History    Occupation:    Tobacco Use    Smoking status: Never    Smokeless tobacco: Never   Vaping Use    Vaping Use: Never used   Substance and Sexual Activity    Alcohol use: No    Drug use: No    Sexual activity: Yes     Partners: Male     Comment: hx chlamydia, treated   Social History Narrative    Working on criminal justice degree        Lives alone with son        No pets        Hobbies: Personal Care     Social Determinants of Health     Financial Resource Strain: Low Risk     Difficulty of Paying Living Expenses: Not hard at all   Food Insecurity: No Food Insecurity    Worried About [employer] [employer] in the Last Year: Never true    [facility] in the Last Year: Never true       Pulse 83, height 5' 8\" (1.727 m), weight 278 lb (126.1 kg), SpO2 99 %, not currently breastfeeding. Physical Exam  Constitutional:       Appearance: She is well-developed. HENT:      Head: Normocephalic and atraumatic. Eyes:      Conjunctiva/sclera: Conjunctivae normal.      Pupils: Pupils are equal, round, and reactive to light. Cardiovascular:      Rate and Rhythm: Normal rate. Pulmonary:      Effort: Pulmonary effort is normal.   Abdominal:      General: Bowel sounds are normal.      Palpations: Abdomen is soft. Comments: Soft nontender no palpable mass   Musculoskeletal:         General: Normal range of motion. Cervical back: Normal range of motion. Skin:     General: Skin is warm. Neurological:      Mental Status: She is alert.        Laboratory, Pathology, Radiology reviewed indetail with relevant important investigations summarized below:  Lab Results   Component Value Date    WBC 9.3 08/06/2022    HGB 12.1 08/06/2022    HCT 36.8 (L) 08/06/2022    MCV 85.9 08/06/2022     08/06/2022     Lab Results   Component Value Date    ALT 17 08/06/2022    AST 8 08/06/2022    ALKPHOS 111 08/06/2022    BILITOT 0.4 08/06/2022       No results found. Endoscopic investigations:     Assessmentand Plan:  24 y.o. female with history of intermittent abdominal pain associated with altered bowel habits with passage of watery bowel movement and occasional mucus in the stool, CBC and CMP from August 2022 was unremarkable. Most probably IBS. We will get stool studies and check CRP and try Bentyl, if no response would consider colonoscopy. Diagnosis Orders   1. Irritable bowel syndrome with diarrhea  High Sensitivity Crp    Calprotectin Stool    Fecal lactoferrin    Gastrointestinal Panel, Molecular    O&P SCREEN(GIARDIA/CRYPTOSPORIDIUM) #1    Clostridium Difficile Toxin/Antigen        Return in about 3 months (around 2/25/2023). Wiliam Moralez MD   Staff Gastroenterologist  [facility]    Please note this report has been partially produced using speech recognition software and may cause contain errors related to thatsystem including grammar, punctuation and spelling as well as words and phrases that may seem inappropriate. If there are questions or concerns please feel free to contact me to clarify.

## 2022-11-28 ENCOUNTER — NURSE ONLY (OUTPATIENT)
Dept: BARIATRICS/WEIGHT MGMT | Age: 27
End: 2022-11-28

## 2022-11-28 VITALS — WEIGHT: 279.2 LBS | BODY MASS INDEX: 42.31 KG/M2 | HEIGHT: 68 IN

## 2022-11-28 NOTE — PROGRESS NOTES
Nutrition follow up prior to surgery  Visit number:  2 out of 6    Initial Weight 280 lbs    Wt Readings from Last 3 Encounters:   11/25/22 278 lb (126.1 kg)   11/10/22 269 lb (122 kg)   10/31/22 278 lb (126.1 kg)     lost ~1 lb over 1 month. Previous nutrition goals Eat 5-6x/day  Include protein at each meal and consume protein first  Sample protein supplements  Purchase recommended vitamins  Begin structured exercise, at least 3x/week with goal 5x/week 60 min. Nutrition goals complete No, patient has started consuming breakfast daily and continues to avoid fast food however including sugary cereal. Patient also adding sugar to water with lemon. Patient has sampled some protein drinks, has not purchased vitamins, and has only gone to the gym twice over the past month. Education: Reviewed role of protein and sources of. Discussed importance of including protein at each meals. Also discussed importance of avoiding added sugar and role in dumping syndrome. Set exercise goals and encouraged written food journal.     New goals:  1: Avoid sugary cereals, instead include egg or yogurt as part of breakfast  2. Avoid adding sugar to water  3. Measure salad dressing and choose low fat  4. Exercise at gym 2x/week, schedule at home 3-4x/week  5. Keep written food journal    RDN, LD will continue to monitor adherence to goals. Follow up with team per protocol.

## 2022-12-20 ENCOUNTER — NURSE ONLY (OUTPATIENT)
Dept: BARIATRICS/WEIGHT MGMT | Age: 27
End: 2022-12-20

## 2022-12-20 ENCOUNTER — OFFICE VISIT (OUTPATIENT)
Dept: BARIATRICS/WEIGHT MGMT | Age: 27
End: 2022-12-20

## 2022-12-20 VITALS — WEIGHT: 278.4 LBS | BODY MASS INDEX: 42.19 KG/M2 | HEIGHT: 68 IN

## 2022-12-20 DIAGNOSIS — F50.81 BINGE-EATING DISORDER, IN PARTIAL REMISSION, MILD: ICD-10-CM

## 2022-12-20 DIAGNOSIS — E66.01 MORBID OBESITY WITH BMI OF 40.0-44.9, ADULT (HCC): Primary | ICD-10-CM

## 2022-12-20 DIAGNOSIS — E11.9 TYPE 2 DIABETES MELLITUS WITHOUT COMPLICATION, WITHOUT LONG-TERM CURRENT USE OF INSULIN (HCC): ICD-10-CM

## 2022-12-20 DIAGNOSIS — F40.10 SOCIAL ANXIETY DISORDER: ICD-10-CM

## 2022-12-20 NOTE — PROGRESS NOTES
Bariatric Pre-Surgical Psychological Evaluation  Cristopher Cervantes Psy.D., \[facility]\""  Licensed Clinical Psychologist Formerly [facility] O.[id number])        Date of Evaluation: 1/10/2023    Date of Report: 1/10/2023    Name: Daya Ortiz    Date of Birth 1995    Patient provided informed consent for the behavioral health evaluation. Discussed with patient the limits of confidentiality (i.e., abuse reporting, suicide intervention, review by treatment team, review by insurance company, etc.) and purpose of the evaluation. Patient indicated understanding. Purpose of Assessment: Suraj Pierce is a 24 y.o. female, who was seen for a pre-surgical psychological evaluation. Suraj Pierce weighs 278.4 pounds and BMI is 42.33. The patient is planning to have the Hector-en-Y Gastric Bypass procedure. Evaluation Procedure:  · Clinical diagnostic interview, Face to Face, and mental status exam  · Alcohol Use Disorders Identification Test (AUDIT)  · Patient Health Questionnaire -9 (PHQ-9)  · Generalized Anxiety Disorder - 7 (DANILO-7)  · Binge Eating Scale (BES)  · Brief Resiliency Scale (BRS)  · Review of patient provided report of psychosocial history, medical history; and other available background information and medical records. · Length of visit: 100 minutes (additional time spent reviewing records, scoring/interpreting assessment measures, and preparing report)      EVALUATION DETAILS:    Patient Active Problem List   Diagnosis    Type 2 diabetes mellitus without complication, without long-term current use of insulin (HCC)    Morbid obesity ([address]    Vitamin D deficiency    Low HDL (under 40)    Depression    Dependent edema    History of 2019 novel coronavirus disease (COVID-19)    Irritable bowel syndrome with diarrhea         No current outpatient medications on file prior to visit.      Current Facility-Administered Medications on File Prior to Visit   Medication Dose Route Frequency Provider Last Rate Last Admin    lactated ringers infusion   IntraVENous Continuous Nadege Castro MD        sodium chloride flush 0.9 % injection 10 mL  10 mL IntraVENous 2 times per day Claudia Alegre MD        sodium chloride flush 0.9 % injection 10 mL  10 mL IntraVENous PRN Nadege Castro MD        nalbuphine (NUBAIN) injection 10 mg  10 mg IntraVENous Q2H PRN Nadege Castro MD        oxytocin (PITOCIN) 30 units in 500 mL infusion  1 israel-units/min IntraVENous Continuous Nadege Castro MD        simethicone (MYLICON) chewable tablet 80 mg  80 mg Oral Q6H PRN Nadege Castro MD        docusate sodium (COLACE) capsule 100 mg  100 mg Oral BID Nadege Castro MD        magnesium hydroxide (MILK OF MAGNESIA) 400 MG/5ML suspension 30 mL  30 mL Oral Daily PRN Nadege Castro MD        bisacodyl (DULCOLAX) suppository 10 mg  10 mg Rectal Daily PRN Nadege Castro MD        ondansetron (ZOFRAN) injection 4 mg  4 mg IntraVENous Q6H PRN Nadege Castro MD        famotidine (PEPCID) injection 20 mg  20 mg IntraVENous BID Nadege Castro MD        oxytocin (PITOCIN) 30 units in 500 mL infusion  1 israel-units/min IntraVENous Continuous PRN Claudia Alegre MD             Past Surgical History:   Procedure Laterality Date    TONSILLECTOMY  2015       Mental Status:  Mood was \"fine\" with congruent affect. Suicidal ideation was denied. Homicidal ideation was denied. Hygiene was good . Dress was appropriate. Behavior was Within Normal Limits with No observation of difficulties ambulating. Attitude was Cooperative, [place], and Friendly. Eye-contact was good. Speech: rate - WNL, rhythm - WNL, volume - WNL  Verbalizations were goal directed and coherent. Thought processes were intact and goal-oriented without evidence of delusions, hallucinations, obsessions, or andrés. Associations were characterized by intact cognitive processes. Pt was oriented to person, place, time, and general circumstances; recent:  good and remote:  good.   Insight and judgment were estimated to be good to fair. Notable Social History:  The patient was raised in [place], [place] by her biological parents; patient noted her parents  earlier this year. Patient has 3 brothers, all older than the patient. Patient described their early childhood as \"good, happy. \" Patient denied any history of childhood abuse or trauma. The patient is currently a student at [employer] and will complete a bachelor's degree in the Spring; she is currently employed as a pre-. Patient has never been  and has 1 child, a son age 11. The patient currently resides with her son. Jacob Lombard said she has only discussed her plan for surgery with a brother and a few of her friends, whom have all been supportive; they have considered the impact weight loss may have on current significant relationships. Mental Health History:  Patient reported she currently participates in outpatient mental health treatment with a counselor at [address] [place] [address] once per month to assist with social anxiety and stress management skills/techniques. Patient denied any history of past suicide attempts and inpatient psychiatric treatment; she noted her child as a protective factor. Patient is not currently prescribed psychiatric medication. Patient described their mood as \"fine\" on most days. The patient reported their sleep as \"good;\" they sleep approximately 7-8 hours per night, on average. Patient has been referred for a PSG study and is awaiting the equipment for an in-home study. There is no known family history of mental health issues. Substance Abuse History:  Patient acknowledged social use of alcohol; every other weekend on average, and Jacob Lombard denies drinking to intoxication. Patient does not use nicotine products. Patient denied using marijuana. The patient denied any other substance use and misuse of prescription medication. There is no known family history of substance abuse.  The patient reports awareness of and agreement with alcohol use plan following surgery (no use for 6 months following surgery, occasional use after six months, recommend no more than 1 glass per sitting). Psychological Testing Results:  Cait Norris was also administered the PHQ-9 and DANILO-7 to measure depression and anxiety. She obtained a score of 6 on the PHQ-9, indicating mild depression and a score of 1 on the DANILO-7, indicating minimal/no anxiety. The AUDIT-C was also completed to assess her/his potential for an alcohol use disorder. She obtained a total score of 1 on this measure, which reflects low-risk consumption. Cait Norris was also administered the Binge Eating Scale (BES); Her scores indicate the possible presence of moderate binge eating behavior. Cait Norris was also administered the Brief Resilience Scale (BRS), which assesses Her ability to recover from stress; Her score on this measure suggests Jumana possesses normal resiliency and ability to bounce back from stressful situations. Sridhars responses on the Mood Disorder Questionnaire (MDQ), which is a screening instrument for bipolar disorder were reviewed; her score on this measure was within normal limits and not suggestive of a specific mood disorder. Weight Management and History of Eating Disordered Behavior:  Cait Norris reported that difficulty with weight management began during childhood. Her highest reported adult weight has been 280 lbs. , which was her/his weight at age 24. Her lowest reported adult weight has been 265 lbs. , which was her/his weight at age 23. Many weight loss strategies have been attempted but none have resulted in long term maintenance. The most successful weight loss attempt was \"a 1000 calorie diet and sports during high school,\" with which they lost approximately 70 lbs. The patient reported a recent history of binge eating.  Cait Norris reports the following symptoms consistent with binge eating disorder: consuming an amount of food in a discrete period of time (eg, two hours) that is definitely larger than what most people would eat in a similar amount of time under similar circumstances, experiencing a sense of lack of control with regard to what and/or how much she is eating, eating much more rapidly than normal, eating until feeling uncomfortably full, eating alone because of feeling embarrassed by how much one is eating, an ability to control the amount of food she consumes when others are present, and feeling very guilty and disappointed in self afterward. She noted that these episodes occurred 1-3 times per week, for a period of time exceeding 3 months and was not associated with the recurrent use of any inappropriate compensatory behavior. Kerline Capellan stated she would often skip a meal and then feels she needs to \"make up for it\" and overeats as a result. However, Kerline Capellan stated she has not experienced any episodes in the past 3 weeks and has been making efforts to prevent situations that may encourage such behavior. The patient verbalizes the following understanding of the factors contributing to their obesity: Emotional overeating, excess portions, sedentary lifestyle, diabetes, early learning, and genetics/family/cultural influence. Patient states typical reasons for emotional eating include: boredom. Patient denied any history of abuse of laxatives, self-induced vomiting, compulsive exercising, NSRED, or PICA; she noted waking during the night to eat as a child, but noted this has not occurred as an adult. Overview of Current Eating and Exercise Patterns:  Kerline Capellan reports doing well with eating 3 meals and 2 snacks per day with protein at each meal. Patient has reduced sweets, salty snacks, and fast food. Kerline Capellan noted she does not typically consume junk foods but struggles with portion sizes during regular meals. Patient has eliminated carbonated drinks.  Patient reported drinking 2-3, 12 oz cans of Celsius per week, which contains approximately 200 mg of caffeine. Patient has taste tested protein shakes and typically uses Slim Fast (high protein) or 2 Atkins shakes. Patient is taking B12 but not currently using a MVI. Kelsie Etienne reported that she engages in exercise, which includes circuit training at [employer], 2-3 times per week. Motivation, Awareness of Risks, and Compliance:  Kelsie Etienne exhibited adequate awareness of the risks of bariatric surgery and the problems that can occur if they do not comply with post operative instructions; however, she believes the benefits will outweigh the potential risks, as she hopes to minimize or reverse the effects of diabetes. Kelsie Etienne was able to adequately describe the Gastric Bypass (Hector-en-Y) procedure. She reports realistic expectations for the procedure, as her overall goals include elimination of diabetes, increased energy and mobility, and improved overall quality of life; she reported a personal goal weight of 140-150 lbs. She understands the need for permanent lifestyle change, including dietary modifications and a regular exercise program, and expressed willingness to implement the necessary changes. She expressed a commitment to comply with treatment recommendations through this office. She identified her brother and friends as a good support system in her efforts to meet her weight management goals. Clinical Impression Summary:  Kelsie Etienne is a 24 y.o. female referred for a pre-surgical psychological evaluation to identify psychosocial issues that may complicate outcomes and provide recommendations for how to improve these potential problems. Overall, the patient presented as cooperative and motivated to participate in the evaluation process. Based upon clinical interview, behavioral observations, medical records review, and testing data, Kelsie Etienne 's psychological suitability for bariatric surgery was predicted to be moderate. She is strongly motivated to lose weight.  She also has good social support to help her access additional treatment if needed. She is currently psychologically healthy, and there are no substance abuse or psychiatric conditions present that contraindicate moving forward, so long as binge-eating and loss of control eating behaviors do not return; she will also need to obtain a letter of support from her current treating mental health specialist. She has shown the ability to modify she diet in the past few months, as well as maintain an exercise program and make good nutritional choices, which speaks well to her ability for behavior change. She is cognitively capable of understanding and complying with the required lifestyle changes and dietary restrictions involved to maintain health and weight loss following her surgery. Diagnosis:    1. Morbid obesity with BMI of 40.0-44.9, adult ([facility]    2. Type 2 diabetes mellitus without complication, without long-term current use of insulin ([facility]    3. Binge-eating disorder, in partial remission, mild    4. Social anxiety disorder (by report)          Recommendations: Lam Hillman will follow up with Bariatric Psychologist post-operatively to monitor eating habits/behaviors and to develop additional stress management skills. From a psychological perspective, Lam Hillman presents as a good candidate for bariatric surgery at this time, but ongoing focused behavioral intervention to enhance their ability to benefit from bariatric surgery after surgery is recommended. Outcomes could be improved if the following recommendations are adhered to:    1. Following surgery, she may benefit from supportive therapy to help her cope with the dietary restrictions required of her. A group support model is recommended, if available. 2. She would likely benefit from ongoing focused behavioral intervention to address recent Mild Binge Eating Disorder as measured with the BES and confirmed by clinical interview.  Discussed why binge eating episodes are of concern when considering bariatric surgery, both in the betty-operative period where patients can suffer complications if they binge eat, and the long-term success of maintaining weight loss if binge eating restarts. 3. The patient is receiving mental health treatment for social anxiety and stress, and is reportedly currently stable. The patient plans to continue this treatment, which appears appropriate for the condition. She will obtain a letter of support from her therapist/counselor. [address] will follow up with Bariatric Psychologist post-operatively, as noted above.         Electronically signed by Katey Bolanos PSYD on 12/20/22 at 10:03 AM EST

## 2022-12-20 NOTE — PROGRESS NOTES
Nutrition follow up prior to surgery  Visit number:  3 out of 3    Initial Weight: 280 lbs    Wt Readings from Last 3 Encounters:   11/28/22 279 lb 3.2 oz (126.6 kg)   11/25/22 278 lb (126.1 kg)   11/10/22 269 lb (122 kg)     lost 1 lb over 1 month, total 2.8 lbs over 2 months. Previous nutrition goals:   1: Avoid sugary cereals, instead include egg or yogurt as part of breakfast  2. Avoid adding sugar to water  3. Measure salad dressing and choose low fat  4. Exercise at gym 2x/week, schedule at home 3-4x/week  5. Keep written food journal    Nutrition goals: partially met, patient keeping food journal most days, avoiding sugary cereals and stopped adding sugar to water. Including 3 meals/day, protein at each meal and sometimes Slim Fast High Protein shake as part of meal. Patient going to the gym 2x/week for 60-90 minutes in the evening. States feeling very hungry when gets home. Patient hasn't purchased vitamins yet. PES Statement:  Overweight/Obesity related to a complex combination of decreased energy needs, disordered eating patterns, physical inactivity, and increased psychological/life stress as evidenced by BMI greater than 30 and inability to maintain a significant amount of weight loss through conventional weight loss interventions. Intervention:  Reviewed previous goals with patient and set new goals. Provided continued education regarding diet and lifestyle changes for optimal success post bariatric surgery. Encouragement and support provided. Discussed potential surgery date of end of January, early February. Reviewed 2 week liver shrinking diet.     Plan/Recommendations:   Continue current poc, use bariatric plate method to plan meals  Add healthy snack in the evening as needed  Purchase all required vitamins  Continue food journal    Follow up: 2 weeks pre op   Total time involved in direct patient education: 30 minutes    Ericka Andrade RD

## 2022-12-21 ENCOUNTER — OFFICE VISIT (OUTPATIENT)
Dept: OBGYN CLINIC | Age: 27
End: 2022-12-21
Payer: COMMERCIAL

## 2022-12-21 VITALS
SYSTOLIC BLOOD PRESSURE: 110 MMHG | DIASTOLIC BLOOD PRESSURE: 68 MMHG | BODY MASS INDEX: 42.44 KG/M2 | HEIGHT: 68 IN | WEIGHT: 280 LBS | HEART RATE: 72 BPM

## 2022-12-21 DIAGNOSIS — Z11.3 SCREENING FOR STD (SEXUALLY TRANSMITTED DISEASE): ICD-10-CM

## 2022-12-21 DIAGNOSIS — Z01.419 VISIT FOR GYNECOLOGIC EXAMINATION: Primary | ICD-10-CM

## 2022-12-21 DIAGNOSIS — Z11.51 SCREENING FOR HPV (HUMAN PAPILLOMAVIRUS): ICD-10-CM

## 2022-12-21 LAB — HEPATITIS B SURFACE ANTIGEN INTERPRETATION: NORMAL

## 2022-12-21 PROCEDURE — 99395 PREV VISIT EST AGE 18-39: CPT | Performed by: OBSTETRICS & GYNECOLOGY

## 2022-12-21 PROCEDURE — G8482 FLU IMMUNIZE ORDER/ADMIN: HCPCS | Performed by: OBSTETRICS & GYNECOLOGY

## 2022-12-21 RX ORDER — METRONIDAZOLE 500 MG/1
500 TABLET ORAL 2 TIMES DAILY
Qty: 14 TABLET | Refills: 0 | Status: SHIPPED | OUTPATIENT
Start: 2022-12-21 | End: 2022-12-28

## 2022-12-21 NOTE — PROGRESS NOTES
Subjective: Krupa Wise is a 24 y. o.female  here for routine exam.  Current Complaints: normal menses, no abnormal bleeding, pelvic pain or discharge, no breast pain or new or enlarging lumps on self exam.    Menstrual history:   regular menses 30 days  Sexual activity:  no, denies knowledge of risky exposure  Abnormal vaginaldischarge:  Yes  Contraceptive method:  none    Vitals:  /68 (Site: Right Upper Arm, Position: Sitting, Cuff Size: Large Adult)   Pulse 72   Ht 5' 8\" (1.727 m)   Wt 280 lb (127 kg)   BMI 42.57 kg/m²   Allergies:  Tomato     Past Medical History:   Diagnosis Date    Depression 2021    Diet controlled gestational diabetes mellitus (GDM), antepartum 10/25/2017    History of 2019 novel coronavirus disease (COVID-19) 2021    DX [facility]21 @ [facility]    Hyperemesis complicating pregnancy, antepartum 2017    Low HDL (under 40) 2021    Morbid obesity ([address] 2021    Type 2 diabetes mellitus in pregnancy      Past Surgical History:   Procedure Laterality Date    TONSILLECTOMY  2015     Family History   Problem Relation Age of Onset    Hypertension Mother     Asthma Mother     High Cholesterol Father     Hypertension Father     Diabetes Father     Birth Defects Sister     Mental Retardation Sister     Early Death Sister     Diabetes Maternal Grandmother     Diabetes Paternal Grandmother     No Known Problems Son      Social History     Socioeconomic History    Marital status: Single     Spouse name: Not on file    Number of children: Not on file    Years of education: Not on file    Highest education level: Not on file   Occupational History    Occupation:    Tobacco Use    Smoking status: Never    Smokeless tobacco: Never   Vaping Use    Vaping Use: Never used   Substance and Sexual Activity    Alcohol use: No    Drug use: No    Sexual activity: Yes     Partners: Male     Comment: hx chlamydia, treated   Other Topics Concern    Not on file   Social History Narrative    Working on criminal justice degree        Lives alone with son        No pets        Hobbies: Personal Care     Social Determinants of Health     Financial Resource Strain: Low Risk     Difficulty of Paying Living Expenses: Not hard at all   Food Insecurity: No Food Insecurity    Worried About Running Out of Food in the Last Year: Never true    Ran Out of Food in the Last Year: Never true   Transportation Needs: Not on file   Physical Activity: Not on file   Stress: Not on file   Social Connections: Not on file   Intimate Partner Violence: Not on file   Housing Stability: Not on file       Gynecologic History  No LMP recorded. Last Pap: 1 YR Results: abnormal, hpv 52   Last Mammogram: No Results: N/A  Fh  BREAST CANCER : NEG   OB History          1    Para        Term                AB        Living             SAB        IAB        Ectopic        Molar        Multiple        Live Births                  Patient's medications, allergies, past medical, surgical, social and family histories were reviewed and updated as appropriate. Review of Systems  Review of Systems  Review of Systems   Constitutional: Negative for chills and fever. Respiratory: Negative for cough and shortnessof breath. Cardiovascular: Negative for chest pain and palpitations. Gastrointestinal: Negative for nausea and vomiting. Genitourinary: Negative for dysuria,frequency and urgency. Musculoskeletal: Negative for myalgias. Neurological: Negative for dizziness, seizures and headaches. Psychiatric/Behavioral: Negative for depression and suicidal ideas. All other systemsreviewed and are negative.     Objective:     Vitals:  /68 (Site: Right Upper Arm, Position: Sitting, Cuff Size: Large Adult)   Pulse 72   Ht 5' 8\" (1.727 m)   Wt 280 lb (127 kg)   BMI 42.57 kg/m²     Physical Exam  Physical Exam  Physical Exam   Constitutional: She is oriented to person,place, and time and well-developed, well-nourished, and in no distress. HENT:   Head: Normocephalic and atraumatic. Eyes: Conjunctivae are normal. Pupils are equal, round, andreactive to light. Neck: Normal range of motion. Neck supple. Cardiovascular: Normal rate and regular rhythm. Pulmonary/Chest: Effort normal and breath soundsnormal. No respiratory distress. Right breast exhibits no inverted nipple, no mass, no nipple discharge, no skin change and no tenderness. Left breast exhibits no inverted nipple, no mass, no nipple discharge, no skin changeand no tenderness. Breasts are symmetrical.   Abdominal: Soft. Bowel sounds are normal.   Genitourinary: Vagina normal, uterus normal and cervix normal. No vaginal discharge found. Musculoskeletal: Normal range of motion. Neurological: She is alert and oriented to person, place, and time. She has normal reflexes. Psychiatric: Memory, affect and judgment normal.       Assessment:      Diagnosis Orders   1. Visit for gynecologic examination  PAP SMEAR      2. Screening for HPV (human papillomavirus)  PAP SMEAR      3. Screening for STD (sexually transmitted disease)  C.trachomatis N.gonorrhoeae DNA    Trichomonas Screen    RPR    Hepatitis C Antibody    HIV Screen    Hepatitis B Surface Antigen          Body mass index is 42.57 kg/m². Obesity:  Class III  Smoking:  No    Plan:   Pap smear : indicated:  performed. Breast exam : Normal  STD work up : Indicated      Obesity Counseling:  Given  Smoking Counseling:  N/A  STD counseling: Discussed safe sexual practice in detail    Orders Placed This Encounter   Procedures    C.trachomatis N.gonorrhoeae DNA     Standing Status:   Future     Standing Expiration Date:   12/21/2023    Trichomonas Screen     Standing Status:   Future     Standing Expiration Date:   12/21/2023    PAP SMEAR     Standing Status:   Future     Standing Expiration Date:   12/21/2023     Order Specific Question:   Collection Type     Answer:    Thin Prep Order Specific Question:   Prior Abnormal Pap Test     Answer:   No     Order Specific Question:   Screening or Diagnostic     Answer:   Screening     Order Specific Question:   HPV Requested? Answer:   Yes     Order Specific Question:   High Risk Patient     Answer:   N/A    RPR     Standing Status:   Future     Number of Occurrences:   1     Standing Expiration Date:   12/21/2023    Hepatitis C Antibody     Standing Status:   Future     Number of Occurrences:   1     Standing Expiration Date:   12/21/2023    HIV Screen     Standing Status:   Future     Number of Occurrences:   1     Standing Expiration Date:   12/21/2023    Hepatitis B Surface Antigen     Standing Status:   Future     Number of Occurrences:   1     Standing Expiration Date:   12/21/2023       Orders Placed This Encounter   Medications    metroNIDAZOLE (FLAGYL) 500 MG tablet     Sig: Take 1 tablet by mouth 2 times daily for 7 days     Dispense:  14 tablet     Refill:  0       Follow up:  No follow-ups on file.       Sadia Meneses MD

## 2022-12-22 ENCOUNTER — TELEPHONE (OUTPATIENT)
Dept: PULMONOLOGY | Age: 27
End: 2022-12-22

## 2022-12-22 DIAGNOSIS — G47.33 OSA (OBSTRUCTIVE SLEEP APNEA): Primary | ICD-10-CM

## 2022-12-22 LAB
HEPATITIS C ANTIBODY: NONREACTIVE
HIV AG/AB: NONREACTIVE
RPR: NORMAL

## 2022-12-22 NOTE — TELEPHONE ENCOUNTER
BAILEY CAN YOU PLEASE CALL PATIENT. SHE IS STATING THAT HER INSURANCE JUNIOR THEY WILL COVER THE HOME SLEEP STUDY BUT IT WILL BE FOUR WEEKS BEFORE THEY MAIL IT OUT. SHE HAS QUESTIONS WETHER SHE SHOULD HAVE THE BASELINE NOW?

## 2023-01-04 ENCOUNTER — HOSPITAL ENCOUNTER (OUTPATIENT)
Dept: LAB | Age: 28
Discharge: HOME | End: 2023-01-04
Payer: COMMERCIAL

## 2023-01-04 ENCOUNTER — HOSPITAL ENCOUNTER (OUTPATIENT)
Dept: SLEEP CENTER | Age: 28
Discharge: HOME | End: 2023-01-06
Payer: COMMERCIAL

## 2023-01-04 ENCOUNTER — OFFICE VISIT (OUTPATIENT)
Dept: FAMILY MEDICINE CLINIC | Age: 28
End: 2023-01-04
Payer: COMMERCIAL

## 2023-01-04 VITALS
WEIGHT: 278.2 LBS | BODY MASS INDEX: 42.3 KG/M2 | SYSTOLIC BLOOD PRESSURE: 120 MMHG | DIASTOLIC BLOOD PRESSURE: 88 MMHG | HEART RATE: 77 BPM | TEMPERATURE: 97.7 F | OXYGEN SATURATION: 98 %

## 2023-01-04 DIAGNOSIS — E55.9 VITAMIN D DEFICIENCY: ICD-10-CM

## 2023-01-04 DIAGNOSIS — R79.89 ABNORMAL LFTS (LIVER FUNCTION TESTS): Primary | ICD-10-CM

## 2023-01-04 DIAGNOSIS — Z00.00 WELL ADULT EXAM: Primary | ICD-10-CM

## 2023-01-04 DIAGNOSIS — E66.01 MORBID OBESITY (HCC): ICD-10-CM

## 2023-01-04 DIAGNOSIS — E01.0 THYROMEGALY: ICD-10-CM

## 2023-01-04 DIAGNOSIS — R19.5 MUCUS IN STOOL: ICD-10-CM

## 2023-01-04 DIAGNOSIS — G47.33 OSA (OBSTRUCTIVE SLEEP APNEA): ICD-10-CM

## 2023-01-04 DIAGNOSIS — R19.4 CHANGE IN BOWEL HABITS: ICD-10-CM

## 2023-01-04 DIAGNOSIS — E11.9 TYPE 2 DIABETES MELLITUS WITHOUT COMPLICATION, WITHOUT LONG-TERM CURRENT USE OF INSULIN (HCC): ICD-10-CM

## 2023-01-04 DIAGNOSIS — R10.9 ABDOMINAL CRAMPING: ICD-10-CM

## 2023-01-04 PROBLEM — K58.0 IRRITABLE BOWEL SYNDROME WITH DIARRHEA: Status: ACTIVE | Noted: 2023-01-04

## 2023-01-04 LAB
ALBUMIN SERPL-MCNC: 4 G/DL (ref 3.5–4.6)
ALP BLD-CCNC: 145 U/L (ref 40–130)
ALT SERPL-CCNC: 38 U/L (ref 0–33)
ANION GAP SERPL CALCULATED.3IONS-SCNC: 14 MEQ/L (ref 9–15)
ANISOCYTOSIS: ABNORMAL
AST SERPL-CCNC: 18 U/L (ref 0–35)
ATYPICAL LYMPHOCYTE RELATIVE PERCENT: 8 %
BASOPHILS ABSOLUTE: 0 K/UL (ref 0–0.2)
BASOPHILS RELATIVE PERCENT: 0.4 %
BILIRUB SERPL-MCNC: 0.3 MG/DL (ref 0.2–0.7)
BUN BLDV-MCNC: 8 MG/DL (ref 6–20)
CALCIUM SERPL-MCNC: 9.2 MG/DL (ref 8.5–9.9)
CHLORIDE BLD-SCNC: 106 MEQ/L (ref 95–107)
CO2: 22 MEQ/L (ref 20–31)
CREAT SERPL-MCNC: 0.71 MG/DL (ref 0.5–0.9)
EOSINOPHILS ABSOLUTE: 0.1 K/UL (ref 0–0.7)
EOSINOPHILS RELATIVE PERCENT: 1 %
GFR SERPL CREATININE-BSD FRML MDRD: >60 ML/MIN/{1.73_M2}
GLOBULIN: 3.3 G/DL (ref 2.3–3.5)
GLUCOSE BLD-MCNC: 85 MG/DL (ref 70–99)
HBA1C MFR BLD: 5.9 %
HCT VFR BLD CALC: 39.3 % (ref 37–47)
HEMOGLOBIN: 12.9 G/DL (ref 12–16)
LYMPHOCYTES ABSOLUTE: 1.4 K/UL (ref 1–4.8)
LYMPHOCYTES RELATIVE PERCENT: 7 %
MCH RBC QN AUTO: 28.4 PG (ref 27–31.3)
MCHC RBC AUTO-ENTMCNC: 32.8 % (ref 33–37)
MCV RBC AUTO: 86.4 FL (ref 79.4–94.8)
MICROCYTES: ABNORMAL
MONOCYTES ABSOLUTE: 0.8 K/UL (ref 0.2–0.8)
MONOCYTES RELATIVE PERCENT: 8.6 %
NEUTROPHILS ABSOLUTE: 7.1 K/UL (ref 1.4–6.5)
NEUTROPHILS RELATIVE PERCENT: 76 %
OVALOCYTES: ABNORMAL
PDW BLD-RTO: 13.7 % (ref 11.5–14.5)
PLATELET # BLD: 357 K/UL (ref 130–400)
PLATELET SLIDE REVIEW: NORMAL
POIKILOCYTES: ABNORMAL
POTASSIUM SERPL-SCNC: 4.2 MEQ/L (ref 3.4–4.9)
RBC # BLD: 4.55 M/UL (ref 4.2–5.4)
SMUDGE CELLS: 1.9
SODIUM BLD-SCNC: 142 MEQ/L (ref 135–144)
T4 FREE: 1.33 NG/DL (ref 0.84–1.68)
TOTAL PROTEIN: 7.3 G/DL (ref 6.3–8)
TSH SERPL DL<=0.05 MIU/L-ACNC: 1.28 UIU/ML (ref 0.44–3.86)
WBC # BLD: 9.3 K/UL (ref 4.8–10.8)

## 2023-01-04 PROCEDURE — 83036 HEMOGLOBIN GLYCOSYLATED A1C: CPT | Performed by: FAMILY MEDICINE

## 2023-01-04 PROCEDURE — 82306 VITAMIN D 25 HYDROXY: CPT

## 2023-01-04 PROCEDURE — 99395 PREV VISIT EST AGE 18-39: CPT | Performed by: FAMILY MEDICINE

## 2023-01-04 PROCEDURE — 84439 ASSAY OF FREE THYROXINE: CPT

## 2023-01-04 PROCEDURE — 85025 COMPLETE CBC W/AUTO DIFF WBC: CPT

## 2023-01-04 PROCEDURE — 86376 MICROSOMAL ANTIBODY EACH: CPT

## 2023-01-04 PROCEDURE — 95806 SLEEP STUDY UNATT&RESP EFFT: CPT

## 2023-01-04 PROCEDURE — 80053 COMPREHEN METABOLIC PANEL: CPT

## 2023-01-04 PROCEDURE — 36415 COLL VENOUS BLD VENIPUNCTURE: CPT

## 2023-01-04 PROCEDURE — G8482 FLU IMMUNIZE ORDER/ADMIN: HCPCS | Performed by: FAMILY MEDICINE

## 2023-01-04 PROCEDURE — 84443 ASSAY THYROID STIM HORMONE: CPT

## 2023-01-04 ASSESSMENT — ENCOUNTER SYMPTOMS
COUGH: 0
SINUS PRESSURE: 0
BLOOD IN STOOL: 0
DIARRHEA: 1
COLOR CHANGE: 0
ANAL BLEEDING: 0
CONSTIPATION: 0
NAUSEA: 0
TROUBLE SWALLOWING: 0
CHEST TIGHTNESS: 0
SORE THROAT: 0
VOMITING: 0
ABDOMINAL DISTENTION: 0
EYE PAIN: 0
EYE DISCHARGE: 0
ABDOMINAL PAIN: 1
SHORTNESS OF BREATH: 0
RHINORRHEA: 0
WHEEZING: 0

## 2023-01-04 NOTE — LETTER
[address]  Phone: [phone]  Fax: [phone]    Jesusita Ballard MD            Megan Santana  [address] [place] [address]        To Whom it May Concern: The above named patient has been seen by our office since 05/17/2021. Megan Santana suffers from the following co morbidities: Type 2 diabetes    The patient's current weight is 278 lbs, and  Body mass index is 42.3 kg/m². . The patient has undergone the following weight loss attempts without long term weight reduction: Calorie restricted diet    I feel this patient would benefit from weight loss surgery because  Ger Grijalva has been unsuccessful losing weight with other diet methods, and medical conditions will become life-threatening if  Ger Grijalva does not get help getting the weight under control. I appreciate your consideration for approval.  Please feel free to contact me for any further information.     Sincerely,        Jesusita Ballard MD

## 2023-01-04 NOTE — ASSESSMENT & PLAN NOTE
Patient counseled on healthy dietary choices and the benefits of a lower salt and/or lower carbohydrate diet as appropriate. Patient also counseled on benefits of moderate intensity cardiovascular exercise for 150 minutes per week as they are able. Advice was given to make small changes over time, setting smaller achievable goals until recommended lifestyle changes are reached. Patient has established care with [address] and is going through the process of being evaluated for metabolic weight loss procedure.

## 2023-01-05 LAB — VITAMIN D 25-HYDROXY: 14.5 NG/ML

## 2023-01-05 NOTE — RESULT ENCOUNTER NOTE
CBC unremarkable, TSH and free T4 normal,   CMP with high alk phos 145 and high ALT 38    Please notify patient that recent lab results show the followin. Her initial thyroid testing results are normal  2. Her blood counts show no signs of anemia, a normal white blood cell count, and a normal platelet count. 3.  Her kidney function testing is normal  4. Her liver function testing is elevated with high alkaline phosphatase and high ALT levels. I do not have an explanation for these results. We will follow closely with repeat testing in 1 month to reassess. Patient should follow-up with GI office (Dr. Beverley Son) to discuss any further potential evaluation, particularly in the setting of continued abnormal bowel movements. I have left future orders in her chart, please instruct patient to return to the lab in 1 month for this repeat testing.   Please help her schedule follow-up visit with GI

## 2023-01-06 DIAGNOSIS — E55.9 VITAMIN D DEFICIENCY: Primary | ICD-10-CM

## 2023-01-06 LAB — THYROID PEROXIDASE (TPO) ABS: <4 IU/ML (ref 0–25)

## 2023-01-06 RX ORDER — ERGOCALCIFEROL 1.25 MG/1
50000 CAPSULE ORAL WEEKLY
Qty: 12 CAPSULE | Refills: 0 | Status: SHIPPED | OUTPATIENT
Start: 2023-01-06 | End: 2023-03-31

## 2023-01-06 RX ORDER — ACETAMINOPHEN 160 MG
1 TABLET,DISINTEGRATING ORAL DAILY
Qty: 90 CAPSULE | Refills: 1 | Status: SHIPPED | OUTPATIENT
Start: 2023-01-06

## 2023-01-19 ENCOUNTER — TELEMEDICINE (OUTPATIENT)
Dept: PULMONOLOGY | Age: 28
End: 2023-01-19
Payer: COMMERCIAL

## 2023-01-19 DIAGNOSIS — G47.30 SLEEP DISORDER BREATHING: ICD-10-CM

## 2023-01-19 DIAGNOSIS — E66.9 OBESITY, UNSPECIFIED CLASSIFICATION, UNSPECIFIED OBESITY TYPE, UNSPECIFIED WHETHER SERIOUS COMORBIDITY PRESENT: Primary | ICD-10-CM

## 2023-01-19 PROCEDURE — 99442 PR PHYS/QHP TELEPHONE EVALUATION 11-20 MIN: CPT | Performed by: INTERNAL MEDICINE

## 2023-01-19 NOTE — PROGRESS NOTES
PATIENT VISIT-PULMONARY/SLEEP    1/19/2023     REFERRING PHYSICIAN:  Sachin Dodson MD     REASON FOR REFERRAL:  Bariatric clearance     HPI:     Sami Camilo is a 24 y.o. female who was referred to sleep and pulmonary clinic for evaluation. She is being evaluated for weight loss surgery. She has been seen by bariatric surgeon already. Sleep:   Patient snores, not clear about witnessed apnea. Bed time: 930 PM, wake up time: 530 A. Patient falls asleep in few minutes. Patient wakes up 1-2 times during the night. Patient does not wake up refreshed. Patient feels sleepy during the day but she does not have the time to take a nap. She denies any accidents or near misses. He has some mild shortness of breath with activities like climbing stairs and walking briskly. She denies any cough. Has not been on inhalers in the past.          1/19/23: This is a phone visit. She underwent a home sleep study which I reviewed personally and interpreted the results independently. Sleep study showed few respiratory events. There is no significant nocturnal desaturations. No major changes in her symptoms.       Past Medical History   Past Medical History:   Diagnosis Date    Depression 6/29/2021    Diet controlled gestational diabetes mellitus (GDM), antepartum 10/25/2017    History of 2019 novel coronavirus disease (COVID-19) 12/17/2021    [facility] @  [facility]    Hyperemesis complicating pregnancy, antepartum 01/20/2017    Low HDL (under 40) 5/25/2021    Morbid obesity ([address] 5/17/2021    Type 2 diabetes mellitus in pregnancy        Past Surgical History  Past Surgical History:   Procedure Laterality Date    TONSILLECTOMY  2015       Allergies  Allergies   Allergen Reactions    Tomato Rash       Medications  Current Outpatient Medications   Medication Sig Dispense Refill    vitamin D (ERGOCALCIFEROL) 1.25 MG (01499 UT) CAPS capsule Take 1 capsule by mouth once a week 12 capsule 0    Cholecalciferol (VITAMIN D3) 50 MCG (2000 UT) CAPS Take 1 capsule by mouth daily 90 capsule 1     No current facility-administered medications for this visit.      Facility-Administered Medications Ordered in Other Visits   Medication Dose Route Frequency Provider Last Rate Last Admin    lactated ringers infusion   IntraVENous Continuous Nadege Castro MD        sodium chloride flush 0.9 % injection 10 mL  10 mL IntraVENous 2 times per day Nadege Castro MD        sodium chloride flush 0.9 % injection 10 mL  10 mL IntraVENous PRN Nadege Castro MD        nalbuphine (NUBAIN) injection 10 mg  10 mg IntraVENous Q2H PRN Nadege Castro MD        oxytocin (PITOCIN) 30 units in 500 mL infusion  1 israel-units/min IntraVENous Continuous Nadege Castro MD        simethicone (MYLICON) chewable tablet 80 mg  80 mg Oral Q6H PRN Nadege Castro MD        docusate sodium (COLACE) capsule 100 mg  100 mg Oral BID Nadege Castro MD        magnesium hydroxide (MILK OF MAGNESIA) 400 MG/5ML suspension 30 mL  30 mL Oral Daily PRN Nadege Castro MD        bisacodyl (DULCOLAX) suppository 10 mg  10 mg Rectal Daily PRN Nadege Castro MD        ondansetron (ZOFRAN) injection 4 mg  4 mg IntraVENous Q6H PRN Nadege Castro MD        famotidine (PEPCID) injection 20 mg  20 mg IntraVENous BID Nadege Castro MD        oxytocin (PITOCIN) 30 units in 500 mL infusion  1 israel-units/min IntraVENous Continuous PRN Eric Ziegler MD           Social History  Social History     Tobacco Use    Smoking status: Never    Smokeless tobacco: Never   Substance Use Topics    Alcohol use: No       Family History  Family History   Problem Relation Age of Onset    Hypertension Mother     Asthma Mother     High Cholesterol Father     Hypertension Father     Diabetes Father     Birth Defects Sister     Mental Retardation Sister     Early Death Sister     Diabetes Maternal Grandmother     Diabetes Paternal Grandmother     No Known Problems Son        Review of Systems  All review of systems has been obtained and negative other than what was mentionedin HPI. Physical Exam                     Impression:   Diagnosis Orders   1. Obesity, unspecified classification, unspecified obesity type, unspecified whether serious comorbidity present        2. Sleep disorder breathing                Recommendations:     -I went over the results of her sleep study in details and answered all her questions. There is minimal respiratory events. There is no nocturnal hypoxia.  -She is at low risk for postoperative pulmonary complications. She can proceed with surgery. - Weight loss and exercise has been advised. Return if symptoms worsen or fail to improve. Patient was seen today via Telehealth by agreement and consent in light of the current COVID-19 pandemic. I used the following Telehealth technology: Audio capability only. Total Length of call 11 minutes. The patient was offered and advised video for a more comprehensive evaluation, but the patient declined or was unable to use video. Patient location:  Patient Location: Home. This patient encounter is appropriate and reasonable under the circumstances given the patient's particular presentation at this time. The patient has been advised of the potential risks and limitations of this mode of treatment (including but not limited to the absence of in-person examination) and has agreed to be treated in a remote fashion in spite of them. Any and all of the patient's/patient's family's questions on this issue have been answered and I have made no promises or guarantees to the patient. The patient has also been advised to contact this office for worsening conditions or problems, and seek emergency medical treatment and/or call 911 if the patient deems either necessary. The patient stated that they are currently in the state of [place].  If the patient is a minor, permission has been obtained by the parent or guardian for the patient to receive medical care at this visit.        Electronically signed by Micheal Schultz MD on 1/19/2023 at 1:46 PM

## 2023-01-27 DIAGNOSIS — G47.33 OSA (OBSTRUCTIVE SLEEP APNEA): ICD-10-CM

## 2023-01-31 ENCOUNTER — NURSE ONLY (OUTPATIENT)
Dept: BARIATRICS/WEIGHT MGMT | Age: 28
End: 2023-01-31

## 2023-01-31 VITALS — WEIGHT: 277.6 LBS | HEIGHT: 68 IN | BODY MASS INDEX: 42.07 KG/M2

## 2023-01-31 NOTE — PROGRESS NOTES
Nutrition follow up prior to surgery  Visit number:  4 out of 6    Initial Weight: 280 lbs    Wt Readings from Last 3 Encounters:   01/04/23 278 lb 3.2 oz (126.2 kg)   12/21/22 280 lb (127 kg)   12/20/22 278 lb 6.4 oz (126.3 kg)     lost 1 lbs over 1 month, 2.5 lbs over 3 months. Previous nutrition goals:   Continue current poc, use bariatric plate method to plan meals  Add healthy snack in the evening as needed  Purchase all required vitamins  Continue food journal    Nutrition goals: met, states difficulty getting to the gym this week    PES Statement:  Overweight/Obesity related to a complex combination of decreased energy needs, disordered eating patterns, physical inactivity, and increased psychological/life stress as evidenced by BMI greater than 30 and inability to maintain a significant amount of weight loss through conventional weight loss interventions. Intervention:  Reviewed previous goals with patient and set new goals. Provided continued education regarding diet and lifestyle changes for optimal success post bariatric surgery. Encouragement and support provided.     Plan/Recommendations:   Continue current poc  Resume exercise at least 2x/week  Read education manual    Follow up: 4 weeks     Total time involved in direct patient education: 15 minutes    Rosa Smith RD

## 2023-02-19 PROBLEM — G47.33 OSA (OBSTRUCTIVE SLEEP APNEA): Status: ACTIVE | Noted: 2023-02-19

## 2023-02-28 ENCOUNTER — NURSE ONLY (OUTPATIENT)
Dept: BARIATRICS/WEIGHT MGMT | Age: 28
End: 2023-02-28

## 2023-02-28 VITALS — WEIGHT: 277.4 LBS | HEIGHT: 68 IN | BODY MASS INDEX: 42.04 KG/M2

## 2023-02-28 NOTE — PROGRESS NOTES
Nutrition follow up prior to surgery  Visit number:  5 out of 6    Initial Weight: 280 lbs    Wt Readings from Last 3 Encounters:   01/31/23 277 lb 9.6 oz (125.9 kg)   01/04/23 278 lb 3.2 oz (126.2 kg)   12/21/22 280 lb (127 kg)     stable / unchanged x 1 month, down 3 lbs x 4 months    Previous goals:  Continue current poc  Resume exercise at least 2x/week  Read education manual    Nutrition goals: met, has resumed going to the gym on weekends, walking more often on work days. PES Statement:  Overweight/Obesity related to a complex combination of decreased energy needs, disordered eating patterns, physical inactivity, and increased psychological/life stress as evidenced by BMI greater than 30 and inability to maintain a significant amount of weight loss through conventional weight loss interventions. Intervention:  Reviewed previous goals with patient and set new goals. Provided continued education regarding diet and lifestyle changes for optimal success post bariatric surgery. Encouragement and support provided.     Plan/Recommendations:   Continue current meal plan  Prepare for 2 week liver shrinking diet    Follow up: 4 weeks     Total time involved in direct patient education: 15 minutes    Joyce Moran RD

## 2023-03-21 ENCOUNTER — NURSE ONLY (OUTPATIENT)
Dept: BARIATRICS/WEIGHT MGMT | Age: 28
End: 2023-03-21

## 2023-03-21 VITALS — WEIGHT: 274.4 LBS | BODY MASS INDEX: 41.59 KG/M2 | HEIGHT: 68 IN

## 2023-03-21 NOTE — PROGRESS NOTES
Nutrition follow up prior to surgery  Visit number:  6 out of 6    Initial Weight: 280 lbs    Wt Readings from Last 3 Encounters:   02/28/23 277 lb 6.4 oz (125.8 kg)   01/31/23 277 lb 9.6 oz (125.9 kg)   01/04/23 278 lb 3.2 oz (126.2 kg)     lost 5 lbs over 5 months. Previous nutrition goals:   Continue current poc  Resume exercise at least 2x/week  Read education manual  Nutrition goals: met    PES Statement:  Overweight/Obesity related to a complex combination of decreased energy needs, disordered eating patterns, physical inactivity, and increased psychological/life stress as evidenced by BMI greater than 30 and inability to maintain a significant amount of weight loss through conventional weight loss interventions. Intervention:  Reviewed previous goals with patient and set new goals. Provided continued education regarding diet and lifestyle changes for optimal success post bariatric surgery. Encouragement and support provided. Patient has completed required nutrition appointments and is cleared for bariatric surgery from a nutrition perspective.   Will follow up with dietitian for pre op class and post op per team.     Plan/Recommendations:   Prepare for 2 week liver shrinking diet    Follow up: 1 week post op    Total time involved in direct patient education: 15 minutes    Abi Aldana RD

## 2023-03-27 ENCOUNTER — NURSE TRIAGE (OUTPATIENT)
Dept: OTHER | Facility: CLINIC | Age: 28
End: 2023-03-27

## 2023-03-27 NOTE — TELEPHONE ENCOUNTER
Location of patient: [place]    Received call from lee at "[employer] with mLED. Subjective: Caller states \"left foot swelling getting worse \"     Current Symptoms: left foot swelling hard to get shoe on, no redness , no injury denies calf pain sob, or cp, some tingling at times pt is diabetic, is able to walk but limps     Onset: 3 weeks     Associated Symptoms: NA    Pain Severity: 10/10    Temperature: none       What has been tried: advil, elevated    LMP:  today  Pregnant: No    Recommended disposition: See PCP within 24 Hours    Care advice provided, patient verbalizes understanding; denies any other questions or concerns; instructed to call back for any new or worsening symptoms. Patient/Caller agrees with recommended disposition; writer provided warm transfer to  at "[employer] for appointment scheduling    Attention Provider: Thank you for allowing me to participate in the care of your patient. The patient was connected to triage in response to information provided to the ECC/PSC. Please do not respond through this encounter as the response is not directed to a shared pool. Reason for Disposition   [1] Very swollen ankle AND [2] no fever    Protocols used:  Ankle Swelling-ADULT-AH

## 2023-03-28 ENCOUNTER — OFFICE VISIT (OUTPATIENT)
Dept: FAMILY MEDICINE CLINIC | Age: 28
End: 2023-03-28
Payer: COMMERCIAL

## 2023-03-28 VITALS
OXYGEN SATURATION: 98 % | HEIGHT: 68 IN | TEMPERATURE: 97.8 F | HEART RATE: 95 BPM | WEIGHT: 283 LBS | DIASTOLIC BLOOD PRESSURE: 80 MMHG | BODY MASS INDEX: 42.89 KG/M2 | SYSTOLIC BLOOD PRESSURE: 130 MMHG

## 2023-03-28 DIAGNOSIS — M79.672 LEFT FOOT PAIN: ICD-10-CM

## 2023-03-28 DIAGNOSIS — M25.472 EDEMA OF LEFT ANKLE: ICD-10-CM

## 2023-03-28 DIAGNOSIS — M25.572 LEFT ANKLE PAIN, UNSPECIFIED CHRONICITY: Primary | ICD-10-CM

## 2023-03-28 PROCEDURE — G8482 FLU IMMUNIZE ORDER/ADMIN: HCPCS | Performed by: FAMILY MEDICINE

## 2023-03-28 PROCEDURE — G8427 DOCREV CUR MEDS BY ELIG CLIN: HCPCS | Performed by: FAMILY MEDICINE

## 2023-03-28 PROCEDURE — 1036F TOBACCO NON-USER: CPT | Performed by: FAMILY MEDICINE

## 2023-03-28 PROCEDURE — 99213 OFFICE O/P EST LOW 20 MIN: CPT | Performed by: FAMILY MEDICINE

## 2023-03-28 PROCEDURE — G8417 CALC BMI ABV UP PARAM F/U: HCPCS | Performed by: FAMILY MEDICINE

## 2023-03-28 RX ORDER — NAPROXEN SODIUM 550 MG/1
550 TABLET ORAL 2 TIMES DAILY WITH MEALS
Qty: 20 TABLET | Refills: 0 | Status: SHIPPED | OUTPATIENT
Start: 2023-03-28 | End: 2023-04-07

## 2023-03-28 SDOH — ECONOMIC STABILITY: HOUSING INSECURITY
IN THE LAST 12 MONTHS, WAS THERE A TIME WHEN YOU DID NOT HAVE A STEADY PLACE TO SLEEP OR SLEPT IN A SHELTER (INCLUDING NOW)?: NO

## 2023-03-28 SDOH — ECONOMIC STABILITY: INCOME INSECURITY: HOW HARD IS IT FOR YOU TO PAY FOR THE VERY BASICS LIKE FOOD, HOUSING, MEDICAL CARE, AND HEATING?: NOT HARD AT ALL

## 2023-03-28 SDOH — ECONOMIC STABILITY: FOOD INSECURITY: WITHIN THE PAST 12 MONTHS, YOU WORRIED THAT YOUR FOOD WOULD RUN OUT BEFORE YOU GOT MONEY TO BUY MORE.: NEVER TRUE

## 2023-03-28 SDOH — ECONOMIC STABILITY: FOOD INSECURITY: WITHIN THE PAST 12 MONTHS, THE FOOD YOU BOUGHT JUST DIDN'T LAST AND YOU DIDN'T HAVE MONEY TO GET MORE.: NEVER TRUE

## 2023-03-28 ASSESSMENT — ENCOUNTER SYMPTOMS
WHEEZING: 0
COUGH: 0
DIARRHEA: 0
SHORTNESS OF BREATH: 0
NAUSEA: 0
CHEST TIGHTNESS: 0
ABDOMINAL PAIN: 0
VOMITING: 0

## 2023-03-28 ASSESSMENT — PATIENT HEALTH QUESTIONNAIRE - PHQ9
2. FEELING DOWN, DEPRESSED OR HOPELESS: 0
10. IF YOU CHECKED OFF ANY PROBLEMS, HOW DIFFICULT HAVE THESE PROBLEMS MADE IT FOR YOU TO DO YOUR WORK, TAKE CARE OF THINGS AT HOME, OR GET ALONG WITH OTHER PEOPLE: 0
9. THOUGHTS THAT YOU WOULD BE BETTER OFF DEAD, OR OF HURTING YOURSELF: 0
SUM OF ALL RESPONSES TO PHQ QUESTIONS 1-9: 0
8. MOVING OR SPEAKING SO SLOWLY THAT OTHER PEOPLE COULD HAVE NOTICED. OR THE OPPOSITE, BEING SO FIGETY OR RESTLESS THAT YOU HAVE BEEN MOVING AROUND A LOT MORE THAN USUAL: 0
SUM OF ALL RESPONSES TO PHQ QUESTIONS 1-9: 0
6. FEELING BAD ABOUT YOURSELF - OR THAT YOU ARE A FAILURE OR HAVE LET YOURSELF OR YOUR FAMILY DOWN: 0
7. TROUBLE CONCENTRATING ON THINGS, SUCH AS READING THE NEWSPAPER OR WATCHING TELEVISION: 0
4. FEELING TIRED OR HAVING LITTLE ENERGY: 0
1. LITTLE INTEREST OR PLEASURE IN DOING THINGS: 0
SUM OF ALL RESPONSES TO PHQ QUESTIONS 1-9: 0
SUM OF ALL RESPONSES TO PHQ QUESTIONS 1-9: 0
3. TROUBLE FALLING OR STAYING ASLEEP: 0
SUM OF ALL RESPONSES TO PHQ9 QUESTIONS 1 & 2: 0
5. POOR APPETITE OR OVEREATING: 0

## 2023-03-28 NOTE — ASSESSMENT & PLAN NOTE
Suspected ankle/foot sprain secondary to reported symptoms/exam. Will obtain xray to R/O bony injury d/t duration of sx. patient will use ice, elevation, lace up brace, and prescription strength NSAID for conservative management.   Referral placed to orthopedics for further evaluation

## 2023-03-28 NOTE — PROGRESS NOTES
Jessica Johnson (: 1995) is a 34 y.o. female, Established patient, who presents today for:    Chief Complaint   Patient presents with    Foot Swelling     Patient states that she has left foot swelling that started about 3 weeks ago. Patient states that she did not twist or hurt the foot just started swelling          ASSESSMENT/PLAN    1. Left ankle pain, unspecified chronicity  Assessment & Plan:  Suspected ankle/foot sprain secondary to reported symptoms/exam. Will obtain xray to R/O bony injury d/t duration of sx. patient will use ice, elevation, lace up brace, and prescription strength NSAID for conservative management. Referral placed to orthopedics for further evaluation  Orders:  -     XR ANKLE LEFT (2 VIEWS); Future  -     1101  -     naproxen sodium (ANAPROX) 550 MG tablet; Take 1 tablet by mouth 2 times daily (with meals) for 10 days, Disp-20 tablet, R-0Normal  2. Left foot pain  -     XR FOOT LEFT (2 VIEWS); Future  -     naproxen sodium (ANAPROX) 550 MG tablet; Take 1 tablet by mouth 2 times daily (with meals) for 10 days, Disp-20 tablet, R-0Normal  3. Edema of left ankle  -     XR ANKLE LEFT (2 VIEWS); Future  -     1101    Return for KEEP NEXT SCHEDULED VISIT. SUBJECTIVE/OBJECTIVE:    HPI    Patient presents for acute visit regarding left lower extremity edema. She reports 3 week history of left foot/ankle swelling which has not resolved since first noted. She reports pain over the ankle and foot. There is no reported overlying erythema or warmth, she denies any open lesions/wounds and denies any drainage or bleeding. She denies any preceding injury. She denies any similar issues in the past. She has been elevating the leg with no benefit. She has taken tylenol and ibuprofen at home with temporary help with pain, but no improvement with swelling.      Home BG values have usually been
negative...

## 2023-05-10 DIAGNOSIS — E66.01 MORBID OBESITY WITH BMI OF 40.0-44.9, ADULT (HCC): Primary | ICD-10-CM

## 2023-05-11 DIAGNOSIS — E66.01 MORBID OBESITY (HCC): ICD-10-CM

## 2023-05-11 DIAGNOSIS — E66.01 MORBID OBESITY WITH BMI OF 40.0-44.9, ADULT (HCC): ICD-10-CM

## 2023-05-11 LAB
HCG UR QL: NEGATIVE
REASON FOR REJECTION: NORMAL
REJECTED TEST: NORMAL
TSH REFLEX: 1.43 UIU/ML (ref 0.44–3.86)

## 2023-05-12 LAB
AMPHETAMINES UR QL: NEGATIVE NG/ML
BARBITURATES UR QL: NEGATIVE NG/ML
BENZODIAZ UR QL: NEGATIVE NG/ML
CANNABINOIDS UR QL SCN: NEGATIVE NG/ML
COCAINE UR QL: NEGATIVE NG/ML
CREAT UR-MCNC: 34.8 MG/DL (ref 20–400)
Lab: NORMAL
MDMA CTO UR SCN-MCNC: NEGATIVE NG/ML
OPIATES UR QL: NEGATIVE NG/ML
OXYCODONE CTO UR SCN-MCNC: NEGATIVE NG/ML
PCP UR QL SCN: NEGATIVE NG/ML
VITAMIN B-12: 476 PG/ML (ref 232–1245)

## 2023-05-23 ENCOUNTER — PREP FOR PROCEDURE (OUTPATIENT)
Dept: BARIATRICS/WEIGHT MGMT | Age: 28
End: 2023-05-23

## 2023-06-05 ENCOUNTER — NURSE ONLY (OUTPATIENT)
Dept: BARIATRICS/WEIGHT MGMT | Age: 28
End: 2023-06-05

## 2023-06-05 DIAGNOSIS — E66.01 MORBID OBESITY WITH BMI OF 40.0-44.9, ADULT (HCC): Primary | ICD-10-CM

## 2023-06-05 NOTE — PROGRESS NOTES
fruit or topping (no red or purple)  Chicken broth       Prescription: SUPREP BOWEL PREP KIT    Start SUPREP at [facility] Follow steps 1-4     Step 1: Pour one 6 oz bottle of SUPREP liquid into mixing container. Step 2: Add cool drinking water to the 16 oz line on the container and mix. Note: Dilute the solution concentrate as directed prior to use. Step 3: Drink ALL the liquid in the container. Step 4: You must drink two (2) more 16 oz containers of water over the next 1 hour. Second Dose start the evening before surgery - Repeat steps 1-4. * If your insurance does not cover SUPREP, you will be prescribed COLYTE. Please see attached instructions. Skin Prep: Night Before & Morning of Surgery     The night prior and morning of surgery, you will be instructed to shower with an antibacterial soap followed by Rosalinda-Hex (Chlorhexidine) provided by [facility]. Shower the night before first with antibacterial soap, rinse, and using a second clean washcloth apply Rosalinda-Hex. Use ½ a bottle of the prescribed soap liberally. Wash from your chest to mid-thigh. Leave on for 5 minutes and rinse. The morning of surgery, repeat the process of washing with antibacterial soap, rinse, and then apply Rosalinda-Hex with a second clean washcloth leaving on for 5 minutes before rinsing. Pay particular attention to skin folds, breasts, & belly button. Bowel Prep Instructions for COLYTE    START DAY BEFORE SURGERY    Clear Liquid Diet:  Protein shake  No prepackaged meal  Clear sugar free clear liquids (no red or purple): Water  Decaf Coffee or Decaf tea (without milk, dairy, or non-dairy creamer)  Sugar free Jello without fruit or topping (no red or purple)  Chicken broth    Colyte can be taken with or without a flavor pack. If you use a flavor pack, complete Steps 1 through 8. If you do not use a flavor pack, throw away the flavor packs and complete Steps 4 through 8. Step 1: Remove the cap from the Colyte bottle.     Step

## 2023-06-07 ENCOUNTER — OFFICE VISIT (OUTPATIENT)
Dept: BARIATRICS/WEIGHT MGMT | Age: 28
End: 2023-06-07
Payer: COMMERCIAL

## 2023-06-07 VITALS
BODY MASS INDEX: 41.13 KG/M2 | HEART RATE: 78 BPM | WEIGHT: 271.4 LBS | HEIGHT: 68 IN | DIASTOLIC BLOOD PRESSURE: 78 MMHG | OXYGEN SATURATION: 98 % | SYSTOLIC BLOOD PRESSURE: 116 MMHG

## 2023-06-07 DIAGNOSIS — E66.01 MORBID OBESITY (HCC): Primary | ICD-10-CM

## 2023-06-07 DIAGNOSIS — E11.9 TYPE 2 DIABETES MELLITUS WITHOUT COMPLICATION, WITHOUT LONG-TERM CURRENT USE OF INSULIN (HCC): ICD-10-CM

## 2023-06-07 DIAGNOSIS — G47.33 OSA (OBSTRUCTIVE SLEEP APNEA): ICD-10-CM

## 2023-06-07 PROCEDURE — 2022F DILAT RTA XM EVC RTNOPTHY: CPT | Performed by: SURGERY

## 2023-06-07 PROCEDURE — 99214 OFFICE O/P EST MOD 30 MIN: CPT | Performed by: SURGERY

## 2023-06-07 PROCEDURE — 1036F TOBACCO NON-USER: CPT | Performed by: SURGERY

## 2023-06-07 PROCEDURE — 3044F HG A1C LEVEL LT 7.0%: CPT | Performed by: SURGERY

## 2023-06-07 PROCEDURE — G8427 DOCREV CUR MEDS BY ELIG CLIN: HCPCS | Performed by: SURGERY

## 2023-06-07 PROCEDURE — G8417 CALC BMI ABV UP PARAM F/U: HCPCS | Performed by: SURGERY

## 2023-06-07 RX ORDER — SODIUM CHLORIDE 0.9 % (FLUSH) 0.9 %
5-40 SYRINGE (ML) INJECTION PRN
OUTPATIENT
Start: 2023-06-07

## 2023-06-07 RX ORDER — HEPARIN SODIUM 5000 [USP'U]/ML
5000 INJECTION, SOLUTION INTRAVENOUS; SUBCUTANEOUS ONCE
OUTPATIENT
Start: 2023-06-07 | End: 2023-06-07

## 2023-06-07 RX ORDER — ACETAMINOPHEN 325 MG/1
1000 TABLET ORAL ONCE
OUTPATIENT
Start: 2023-06-07 | End: 2023-06-07

## 2023-06-07 RX ORDER — TOPIRAMATE 50 MG/1
50 TABLET, FILM COATED ORAL 2 TIMES DAILY
Qty: 60 TABLET | Refills: 3 | Status: SHIPPED | OUTPATIENT
Start: 2023-06-07

## 2023-06-07 RX ORDER — ONDANSETRON 2 MG/ML
4 INJECTION INTRAMUSCULAR; INTRAVENOUS ONCE
OUTPATIENT
Start: 2023-06-07 | End: 2023-06-07

## 2023-06-07 RX ORDER — SODIUM CHLORIDE 9 MG/ML
INJECTION, SOLUTION INTRAVENOUS PRN
OUTPATIENT
Start: 2023-06-07

## 2023-06-07 RX ORDER — GABAPENTIN 100 MG/1
400 CAPSULE ORAL ONCE
OUTPATIENT
Start: 2023-06-07 | End: 2023-06-07

## 2023-06-07 RX ORDER — SODIUM CHLORIDE, SODIUM LACTATE, POTASSIUM CHLORIDE, CALCIUM CHLORIDE 600; 310; 30; 20 MG/100ML; MG/100ML; MG/100ML; MG/100ML
INJECTION, SOLUTION INTRAVENOUS CONTINUOUS
OUTPATIENT
Start: 2023-06-07

## 2023-06-07 RX ORDER — POLYETHYLENE GLYCOL 3350, SODIUM CHLORIDE, SODIUM BICARBONATE, POTASSIUM CHLORIDE 420; 11.2; 5.72; 1.48 G/4L; G/4L; G/4L; G/4L
4000 POWDER, FOR SOLUTION ORAL ONCE
Qty: 4000 ML | Refills: 0 | Status: SHIPPED | OUTPATIENT
Start: 2023-06-07 | End: 2023-06-07

## 2023-06-07 RX ORDER — SODIUM CHLORIDE 0.9 % (FLUSH) 0.9 %
5-40 SYRINGE (ML) INJECTION EVERY 12 HOURS SCHEDULED
OUTPATIENT
Start: 2023-06-07

## 2023-06-07 NOTE — PATIENT INSTRUCTIONS
No aspirin, ibuprofen or other non-steroidal anti-inflammatory drugs (NSAIDs) 7 days prior to surgery. No food or drink six hours prior to surgery. Keep carbohydrates to 20 grams or less a day pre-op. The more weight you lose before surgery, the easier and safer your operation will be, and the easier your recovery will be. Complete the bowel prep the day before surgery.

## 2023-06-07 NOTE — PROGRESS NOTES
Surgery Consultation    Patient Name:  :  Hospital Day: Meera Goodson 1995 [unfilled]     Date of Service: 2023    Subjective:      History of Present Illness:    Meera Goodson  is a 34 y.o. female referred by Dr. India Valverde for morbid obesity and diabetes. Meera Goodson has completed the pre-op process for Bariatric surgery and is currently on the pre-op diet. Per our Psychologist, Dr. Delmar Boateng, there appears to be no active psychiatric contraindications to patient receiving bariatric surgery at this time; there is no evidence of untreated/undertreated clinically significant general psychopathology or substance/alcohol use disorders. Sleep apnea evaluations have demonstrated TANIA. Is on CPAP.       Past Medical History:   Diagnosis Date    Depression 2021    Diet controlled gestational diabetes mellitus (GDM), antepartum 10/25/2017    History of 2019 novel coronavirus disease (COVID-19) 2021    DX 10/2021 @ [facility]    Hyperemesis complicating pregnancy, antepartum 2017    Low HDL (under 40) 2021    Morbid obesity ([address] 2021    Type 2 diabetes mellitus in pregnancy     Past Surgical History:   Procedure Laterality Date    TONSILLECTOMY  2015        Family History   Problem Relation Age of Onset    Hypertension Mother     Asthma Mother     High Cholesterol Father     Hypertension Father     Diabetes Father     Birth Defects Sister     Mental Retardation Sister     Early Death Sister     Diabetes Maternal Grandmother     Diabetes Paternal Grandmother     No Known Problems Son     Social History     Tobacco Use    Smoking status: Never    Smokeless tobacco: Never   Vaping Use    Vaping Use: Never used   Substance Use Topics    Alcohol use: No    Drug use: No        Allergies: Tomato    Review of Systems  Review of Systems - History obtained from the patient  General ROS: negative for - fever, malaise, or weight loss  ENT ROS: negative for - headaches, nasal congestion, or

## 2023-06-16 ENCOUNTER — ANESTHESIA EVENT (OUTPATIENT)
Dept: OPERATING ROOM | Age: 28
DRG: 403 | End: 2023-06-16
Payer: COMMERCIAL

## 2023-06-19 ENCOUNTER — HOSPITAL ENCOUNTER (INPATIENT)
Age: 28
LOS: 2 days | Discharge: HOME | DRG: 403 | End: 2023-06-21
Attending: SURGERY | Admitting: SURGERY
Payer: COMMERCIAL

## 2023-06-19 ENCOUNTER — ANESTHESIA (OUTPATIENT)
Dept: OPERATING ROOM | Age: 28
DRG: 403 | End: 2023-06-19
Payer: COMMERCIAL

## 2023-06-19 DIAGNOSIS — E66.01 MORBID OBESITY (HCC): Primary | ICD-10-CM

## 2023-06-19 PROBLEM — Z98.84 STATUS POST GASTRIC BYPASS FOR OBESITY: Chronic | Status: ACTIVE | Noted: 2023-06-19

## 2023-06-19 LAB
GLUCOSE BLD-MCNC: 116 MG/DL (ref 70–99)
GLUCOSE BLD-MCNC: 78 MG/DL (ref 70–99)
HCG, URINE, POC: NEGATIVE
Lab: NORMAL
NEGATIVE QC PASS/FAIL: NORMAL
PERFORMED ON: ABNORMAL
PERFORMED ON: NORMAL
POSITIVE QC PASS/FAIL: NORMAL

## 2023-06-19 PROCEDURE — 2720000010 HC SURG SUPPLY STERILE: Performed by: SURGERY

## 2023-06-19 PROCEDURE — 2580000003 HC RX 258: Performed by: SURGERY

## 2023-06-19 PROCEDURE — 3600000009 HC SURGERY ROBOT BASE: Performed by: SURGERY

## 2023-06-19 PROCEDURE — 2580000003 HC RX 258: Performed by: NURSE ANESTHETIST, CERTIFIED REGISTERED

## 2023-06-19 PROCEDURE — 6360000002 HC RX W HCPCS: Performed by: NURSE ANESTHETIST, CERTIFIED REGISTERED

## 2023-06-19 PROCEDURE — 7100000001 HC PACU RECOVERY - ADDTL 15 MIN: Performed by: SURGERY

## 2023-06-19 PROCEDURE — 3700000001 HC ADD 15 MINUTES (ANESTHESIA): Performed by: SURGERY

## 2023-06-19 PROCEDURE — 0D164ZA BYPASS STOMACH TO JEJUNUM, PERCUTANEOUS ENDOSCOPIC APPROACH: ICD-10-PCS | Performed by: SURGERY

## 2023-06-19 PROCEDURE — A4217 STERILE WATER/SALINE, 500 ML: HCPCS | Performed by: SURGERY

## 2023-06-19 PROCEDURE — 6360000002 HC RX W HCPCS: Performed by: STUDENT IN AN ORGANIZED HEALTH CARE EDUCATION/TRAINING PROGRAM

## 2023-06-19 PROCEDURE — 6360000002 HC RX W HCPCS: Performed by: SURGERY

## 2023-06-19 PROCEDURE — 3600000019 HC SURGERY ROBOT ADDTL 15MIN: Performed by: SURGERY

## 2023-06-19 PROCEDURE — 2500000003 HC RX 250 WO HCPCS: Performed by: NURSE ANESTHETIST, CERTIFIED REGISTERED

## 2023-06-19 PROCEDURE — 6370000000 HC RX 637 (ALT 250 FOR IP): Performed by: SURGERY

## 2023-06-19 PROCEDURE — 8E0W4CZ ROBOTIC ASSISTED PROCEDURE OF TRUNK REGION, PERCUTANEOUS ENDOSCOPIC APPROACH: ICD-10-PCS | Performed by: SURGERY

## 2023-06-19 PROCEDURE — 1210000000 HC MED SURG R&B

## 2023-06-19 PROCEDURE — 3700000000 HC ANESTHESIA ATTENDED CARE: Performed by: SURGERY

## 2023-06-19 PROCEDURE — 2500000003 HC RX 250 WO HCPCS: Performed by: STUDENT IN AN ORGANIZED HEALTH CARE EDUCATION/TRAINING PROGRAM

## 2023-06-19 PROCEDURE — 64488 TAP BLOCK BI INJECTION: CPT | Performed by: STUDENT IN AN ORGANIZED HEALTH CARE EDUCATION/TRAINING PROGRAM

## 2023-06-19 PROCEDURE — 2709999900 HC NON-CHARGEABLE SUPPLY: Performed by: SURGERY

## 2023-06-19 PROCEDURE — 7100000000 HC PACU RECOVERY - FIRST 15 MIN: Performed by: SURGERY

## 2023-06-19 PROCEDURE — S2900 ROBOTIC SURGICAL SYSTEM: HCPCS | Performed by: SURGERY

## 2023-06-19 RX ORDER — SODIUM CHLORIDE, SODIUM LACTATE, POTASSIUM CHLORIDE, CALCIUM CHLORIDE 600; 310; 30; 20 MG/100ML; MG/100ML; MG/100ML; MG/100ML
INJECTION, SOLUTION INTRAVENOUS CONTINUOUS PRN
Status: DISCONTINUED | OUTPATIENT
Start: 2023-06-19 | End: 2023-06-19 | Stop reason: SDUPTHER

## 2023-06-19 RX ORDER — OXYCODONE HYDROCHLORIDE 5 MG/1
5 TABLET ORAL
Status: DISCONTINUED | OUTPATIENT
Start: 2023-06-19 | End: 2023-06-19 | Stop reason: HOSPADM

## 2023-06-19 RX ORDER — ROCURONIUM BROMIDE 10 MG/ML
INJECTION, SOLUTION INTRAVENOUS PRN
Status: DISCONTINUED | OUTPATIENT
Start: 2023-06-19 | End: 2023-06-19 | Stop reason: SDUPTHER

## 2023-06-19 RX ORDER — SODIUM CHLORIDE 0.9 % (FLUSH) 0.9 %
5-40 SYRINGE (ML) INJECTION EVERY 12 HOURS SCHEDULED
Status: DISCONTINUED | OUTPATIENT
Start: 2023-06-19 | End: 2023-06-19 | Stop reason: HOSPADM

## 2023-06-19 RX ORDER — HEPARIN SODIUM 5000 [USP'U]/ML
5000 INJECTION, SOLUTION INTRAVENOUS; SUBCUTANEOUS 2 TIMES DAILY
Status: DISCONTINUED | OUTPATIENT
Start: 2023-06-19 | End: 2023-06-21 | Stop reason: HOSPADM

## 2023-06-19 RX ORDER — MIDAZOLAM HYDROCHLORIDE 1 MG/ML
INJECTION INTRAMUSCULAR; INTRAVENOUS PRN
Status: DISCONTINUED | OUTPATIENT
Start: 2023-06-19 | End: 2023-06-19 | Stop reason: SDUPTHER

## 2023-06-19 RX ORDER — SODIUM CHLORIDE 9 MG/ML
INJECTION, SOLUTION INTRAVENOUS PRN
Status: DISCONTINUED | OUTPATIENT
Start: 2023-06-19 | End: 2023-06-19 | Stop reason: HOSPADM

## 2023-06-19 RX ORDER — KETOROLAC TROMETHAMINE 30 MG/ML
30 INJECTION, SOLUTION INTRAMUSCULAR; INTRAVENOUS ONCE
Status: COMPLETED | OUTPATIENT
Start: 2023-06-19 | End: 2023-06-19

## 2023-06-19 RX ORDER — ROPIVACAINE HYDROCHLORIDE 5 MG/ML
INJECTION, SOLUTION EPIDURAL; INFILTRATION; PERINEURAL
Status: COMPLETED | OUTPATIENT
Start: 2023-06-19 | End: 2023-06-19

## 2023-06-19 RX ORDER — HYDROMORPHONE HYDROCHLORIDE 1 MG/ML
0.5 INJECTION, SOLUTION INTRAMUSCULAR; INTRAVENOUS; SUBCUTANEOUS EVERY 10 MIN PRN
Status: DISCONTINUED | OUTPATIENT
Start: 2023-06-19 | End: 2023-06-19 | Stop reason: HOSPADM

## 2023-06-19 RX ORDER — HYDROMORPHONE HYDROCHLORIDE 1 MG/ML
1 INJECTION, SOLUTION INTRAMUSCULAR; INTRAVENOUS; SUBCUTANEOUS
Status: DISCONTINUED | OUTPATIENT
Start: 2023-06-19 | End: 2023-06-20

## 2023-06-19 RX ORDER — MEPERIDINE HYDROCHLORIDE 25 MG/ML
12.5 INJECTION INTRAMUSCULAR; INTRAVENOUS; SUBCUTANEOUS
Status: DISCONTINUED | OUTPATIENT
Start: 2023-06-19 | End: 2023-06-19 | Stop reason: HOSPADM

## 2023-06-19 RX ORDER — MAGNESIUM HYDROXIDE 1200 MG/15ML
LIQUID ORAL CONTINUOUS PRN
Status: DISCONTINUED | OUTPATIENT
Start: 2023-06-19 | End: 2023-06-19 | Stop reason: HOSPADM

## 2023-06-19 RX ORDER — SODIUM CHLORIDE 0.9 % (FLUSH) 0.9 %
5-40 SYRINGE (ML) INJECTION EVERY 12 HOURS SCHEDULED
Status: DISCONTINUED | OUTPATIENT
Start: 2023-06-19 | End: 2023-06-21 | Stop reason: HOSPADM

## 2023-06-19 RX ORDER — SODIUM CHLORIDE 9 MG/ML
INJECTION, SOLUTION INTRAVENOUS PRN
Status: DISCONTINUED | OUTPATIENT
Start: 2023-06-19 | End: 2023-06-21 | Stop reason: HOSPADM

## 2023-06-19 RX ORDER — OXYCODONE HYDROCHLORIDE 5 MG/1
10 CAPSULE ORAL EVERY 4 HOURS PRN
Status: DISCONTINUED | OUTPATIENT
Start: 2023-06-20 | End: 2023-06-21 | Stop reason: HOSPADM

## 2023-06-19 RX ORDER — METOCLOPRAMIDE HYDROCHLORIDE 5 MG/ML
10 INJECTION INTRAMUSCULAR; INTRAVENOUS
Status: DISCONTINUED | OUTPATIENT
Start: 2023-06-19 | End: 2023-06-19 | Stop reason: HOSPADM

## 2023-06-19 RX ORDER — LIDOCAINE HYDROCHLORIDE AND EPINEPHRINE 10; 10 MG/ML; UG/ML
INJECTION, SOLUTION INFILTRATION; PERINEURAL PRN
Status: DISCONTINUED | OUTPATIENT
Start: 2023-06-19 | End: 2023-06-19 | Stop reason: SDUPTHER

## 2023-06-19 RX ORDER — LIDOCAINE HYDROCHLORIDE 10 MG/ML
1 INJECTION, SOLUTION EPIDURAL; INFILTRATION; INTRACAUDAL; PERINEURAL
Status: DISCONTINUED | OUTPATIENT
Start: 2023-06-19 | End: 2023-06-19 | Stop reason: HOSPADM

## 2023-06-19 RX ORDER — FENTANYL CITRATE 0.05 MG/ML
50 INJECTION, SOLUTION INTRAMUSCULAR; INTRAVENOUS EVERY 10 MIN PRN
Status: DISCONTINUED | OUTPATIENT
Start: 2023-06-19 | End: 2023-06-19 | Stop reason: HOSPADM

## 2023-06-19 RX ORDER — PROPOFOL 10 MG/ML
INJECTION, EMULSION INTRAVENOUS PRN
Status: DISCONTINUED | OUTPATIENT
Start: 2023-06-19 | End: 2023-06-19 | Stop reason: SDUPTHER

## 2023-06-19 RX ORDER — ONDANSETRON 2 MG/ML
4 INJECTION INTRAMUSCULAR; INTRAVENOUS
Status: DISCONTINUED | OUTPATIENT
Start: 2023-06-19 | End: 2023-06-19 | Stop reason: HOSPADM

## 2023-06-19 RX ORDER — ONDANSETRON 2 MG/ML
4 INJECTION INTRAMUSCULAR; INTRAVENOUS EVERY 6 HOURS PRN
Status: DISCONTINUED | OUTPATIENT
Start: 2023-06-19 | End: 2023-06-21 | Stop reason: HOSPADM

## 2023-06-19 RX ORDER — ACETAMINOPHEN 500 MG
1000 TABLET ORAL ONCE
Status: COMPLETED | OUTPATIENT
Start: 2023-06-19 | End: 2023-06-19

## 2023-06-19 RX ORDER — SODIUM CHLORIDE, SODIUM LACTATE, POTASSIUM CHLORIDE, CALCIUM CHLORIDE 600; 310; 30; 20 MG/100ML; MG/100ML; MG/100ML; MG/100ML
INJECTION, SOLUTION INTRAVENOUS CONTINUOUS
Status: DISCONTINUED | OUTPATIENT
Start: 2023-06-19 | End: 2023-06-19 | Stop reason: HOSPADM

## 2023-06-19 RX ORDER — DIPHENHYDRAMINE HYDROCHLORIDE 50 MG/ML
12.5 INJECTION INTRAMUSCULAR; INTRAVENOUS
Status: DISCONTINUED | OUTPATIENT
Start: 2023-06-19 | End: 2023-06-19 | Stop reason: HOSPADM

## 2023-06-19 RX ORDER — SODIUM CHLORIDE 9 MG/ML
INJECTION, SOLUTION INTRAVENOUS CONTINUOUS
Status: DISCONTINUED | OUTPATIENT
Start: 2023-06-19 | End: 2023-06-21 | Stop reason: HOSPADM

## 2023-06-19 RX ORDER — HEPARIN SODIUM 5000 [USP'U]/ML
5000 INJECTION, SOLUTION INTRAVENOUS; SUBCUTANEOUS ONCE
Status: COMPLETED | OUTPATIENT
Start: 2023-06-19 | End: 2023-06-19

## 2023-06-19 RX ORDER — ACETAMINOPHEN 325 MG/1
650 TABLET ORAL EVERY 6 HOURS
Status: DISCONTINUED | OUTPATIENT
Start: 2023-06-20 | End: 2023-06-21 | Stop reason: HOSPADM

## 2023-06-19 RX ORDER — ONDANSETRON 2 MG/ML
4 INJECTION INTRAMUSCULAR; INTRAVENOUS ONCE
Status: COMPLETED | OUTPATIENT
Start: 2023-06-19 | End: 2023-06-19

## 2023-06-19 RX ORDER — SODIUM CHLORIDE 0.9 % (FLUSH) 0.9 %
5-40 SYRINGE (ML) INJECTION PRN
Status: DISCONTINUED | OUTPATIENT
Start: 2023-06-19 | End: 2023-06-19 | Stop reason: HOSPADM

## 2023-06-19 RX ORDER — DEXAMETHASONE SODIUM PHOSPHATE 10 MG/ML
INJECTION INTRAMUSCULAR; INTRAVENOUS PRN
Status: DISCONTINUED | OUTPATIENT
Start: 2023-06-19 | End: 2023-06-19 | Stop reason: SDUPTHER

## 2023-06-19 RX ORDER — OXYCODONE HYDROCHLORIDE 5 MG/1
5 CAPSULE ORAL EVERY 4 HOURS PRN
Status: DISCONTINUED | OUTPATIENT
Start: 2023-06-20 | End: 2023-06-21 | Stop reason: HOSPADM

## 2023-06-19 RX ORDER — FENTANYL CITRATE 50 UG/ML
INJECTION, SOLUTION INTRAMUSCULAR; INTRAVENOUS PRN
Status: DISCONTINUED | OUTPATIENT
Start: 2023-06-19 | End: 2023-06-19 | Stop reason: SDUPTHER

## 2023-06-19 RX ORDER — SODIUM CHLORIDE 0.9 % (FLUSH) 0.9 %
5-40 SYRINGE (ML) INJECTION PRN
Status: DISCONTINUED | OUTPATIENT
Start: 2023-06-19 | End: 2023-06-21 | Stop reason: HOSPADM

## 2023-06-19 RX ORDER — ONDANSETRON 2 MG/ML
INJECTION INTRAMUSCULAR; INTRAVENOUS PRN
Status: DISCONTINUED | OUTPATIENT
Start: 2023-06-19 | End: 2023-06-19 | Stop reason: SDUPTHER

## 2023-06-19 RX ADMIN — SUGAMMADEX 200 MG: 100 INJECTION, SOLUTION INTRAVENOUS at 13:53

## 2023-06-19 RX ADMIN — SUGAMMADEX 150 MG: 100 INJECTION, SOLUTION INTRAVENOUS at 13:58

## 2023-06-19 RX ADMIN — ROCURONIUM BROMIDE 50 MG: 10 INJECTION INTRAVENOUS at 11:23

## 2023-06-19 RX ADMIN — CEFAZOLIN 3000 MG: 10 INJECTION, POWDER, FOR SOLUTION INTRAVENOUS at 11:28

## 2023-06-19 RX ADMIN — KETOROLAC TROMETHAMINE 30 MG: 30 INJECTION, SOLUTION INTRAMUSCULAR; INTRAVENOUS at 15:39

## 2023-06-19 RX ADMIN — HYDROMORPHONE HYDROCHLORIDE 1 MG: 1 INJECTION, SOLUTION INTRAMUSCULAR; INTRAVENOUS; SUBCUTANEOUS at 20:42

## 2023-06-19 RX ADMIN — SODIUM CHLORIDE: 9 INJECTION, SOLUTION INTRAVENOUS at 14:55

## 2023-06-19 RX ADMIN — CEFAZOLIN 3000 MG: 10 INJECTION, POWDER, FOR SOLUTION INTRAVENOUS at 20:07

## 2023-06-19 RX ADMIN — PROPOFOL 200 MG: 10 INJECTION, EMULSION INTRAVENOUS at 11:23

## 2023-06-19 RX ADMIN — ONDANSETRON 4 MG: 2 INJECTION INTRAMUSCULAR; INTRAVENOUS at 10:28

## 2023-06-19 RX ADMIN — FENTANYL CITRATE 50 MCG: 50 INJECTION, SOLUTION INTRAMUSCULAR; INTRAVENOUS at 11:54

## 2023-06-19 RX ADMIN — HYDROMORPHONE HYDROCHLORIDE 1 MG: 1 INJECTION, SOLUTION INTRAMUSCULAR; INTRAVENOUS; SUBCUTANEOUS at 17:42

## 2023-06-19 RX ADMIN — HYDROMORPHONE HYDROCHLORIDE 1 MG: 1 INJECTION, SOLUTION INTRAMUSCULAR; INTRAVENOUS; SUBCUTANEOUS at 12:55

## 2023-06-19 RX ADMIN — ROPIVACAINE HYDROCHLORIDE 10 ML: 5 INJECTION, SOLUTION EPIDURAL; INFILTRATION; PERINEURAL at 11:27

## 2023-06-19 RX ADMIN — SODIUM CHLORIDE, POTASSIUM CHLORIDE, SODIUM LACTATE AND CALCIUM CHLORIDE: 600; 310; 30; 20 INJECTION, SOLUTION INTRAVENOUS at 11:17

## 2023-06-19 RX ADMIN — MIDAZOLAM HYDROCHLORIDE 2 MG: 1 INJECTION, SOLUTION INTRAMUSCULAR; INTRAVENOUS at 11:17

## 2023-06-19 RX ADMIN — HEPARIN SODIUM 5000 UNITS: 5000 INJECTION INTRAVENOUS; SUBCUTANEOUS at 10:30

## 2023-06-19 RX ADMIN — LIDOCAINE HYDROCHLORIDE AND EPINEPHRINE 20 ML: 10; 10 INJECTION, SOLUTION INFILTRATION; PERINEURAL at 11:34

## 2023-06-19 RX ADMIN — ROCURONIUM BROMIDE 50 MG: 10 INJECTION INTRAVENOUS at 12:09

## 2023-06-19 RX ADMIN — FENTANYL CITRATE 100 MCG: 50 INJECTION, SOLUTION INTRAMUSCULAR; INTRAVENOUS at 11:23

## 2023-06-19 RX ADMIN — ACETAMINOPHEN 1000 MG: 500 TABLET ORAL at 10:27

## 2023-06-19 RX ADMIN — SODIUM CHLORIDE, POTASSIUM CHLORIDE, SODIUM LACTATE AND CALCIUM CHLORIDE: 600; 310; 30; 20 INJECTION, SOLUTION INTRAVENOUS at 10:21

## 2023-06-19 RX ADMIN — ROPIVACAINE HYDROCHLORIDE 50 ML: 5 INJECTION, SOLUTION EPIDURAL; INFILTRATION; PERINEURAL at 11:27

## 2023-06-19 RX ADMIN — FENTANYL CITRATE 50 MCG: 50 INJECTION, SOLUTION INTRAMUSCULAR; INTRAVENOUS at 12:09

## 2023-06-19 RX ADMIN — HEPARIN SODIUM 5000 UNITS: 5000 INJECTION INTRAVENOUS; SUBCUTANEOUS at 20:05

## 2023-06-19 RX ADMIN — DEXAMETHASONE SODIUM PHOSPHATE 10 MG: 10 INJECTION INTRAMUSCULAR; INTRAVENOUS at 11:23

## 2023-06-19 RX ADMIN — GABAPENTIN 400 MG: 300 CAPSULE ORAL at 10:39

## 2023-06-19 RX ADMIN — ONDANSETRON 4 MG: 2 INJECTION INTRAMUSCULAR; INTRAVENOUS at 11:23

## 2023-06-19 RX ADMIN — ROCURONIUM BROMIDE 30 MG: 10 INJECTION INTRAVENOUS at 13:26

## 2023-06-19 ASSESSMENT — PAIN DESCRIPTION - LOCATION
LOCATION: ABDOMEN

## 2023-06-19 ASSESSMENT — PAIN SCALES - GENERAL
PAINLEVEL_OUTOF10: 6
PAINLEVEL_OUTOF10: 8
PAINLEVEL_OUTOF10: 7
PAINLEVEL_OUTOF10: 4
PAINLEVEL_OUTOF10: 8
PAINLEVEL_OUTOF10: 2

## 2023-06-19 ASSESSMENT — PAIN - FUNCTIONAL ASSESSMENT
PAIN_FUNCTIONAL_ASSESSMENT: ACTIVITIES ARE NOT PREVENTED
PAIN_FUNCTIONAL_ASSESSMENT: 0-10

## 2023-06-19 ASSESSMENT — PAIN SCALES - WONG BAKER: WONGBAKER_NUMERICALRESPONSE: 2

## 2023-06-19 NOTE — OP NOTE
Operative Note      Patient: Alyson Lockhart  YOB: 1995  MRN: [id number]    Date of Procedure: 6/19/2023    Pre-Op Diagnosis Codes:     * Morbid [address] [E66.01]     * Type 2 diabetes mellitus with other specified complication ([facility] [N71.55]     * Obstructive sleep apnea (adult) (pediatric) [G47.33]    Post-Op Diagnosis: Same       Procedure(s):  GASTRIC BYPASS RONNA-EN-Y LAPAROSCOPIC/ROBOTIC    Surgeon(s):  Shankar Bal MD    Assistant:   Physician Assistant: Harry Page PA-C    Anesthesia: General    Estimated Blood Loss (mL): Minimal    Complications: None    Specimens:   * No specimens in log *    Implants:  * No implants in log *      Drains:   Closed/Suction Drain Distal LLQ Bulb (Active)       Findings: Morbid obesity        Detailed Description of Procedure:     Detailed Description of Procedure:   Operative details: The patient was brought to the operating room placed in the supine position on the operating table. After obtaining adequate general endotracheal anesthesia the patient was prepped and draped in usual sterile manner with chlorhexidine paint. Proper timeout procedure was taken to correctly identify the patient and procedure. A TAP block was performed bilaterally by Anesthsia prior to entering the room. The abdomen was then entered under videoscopic visualization using the robotic laparoscope and a 8 mm Visiport trocar. The trocar obturator and camera were removed and the abdomen was insufflated to 15 mmHg pressure with carbon dioxide. The 0 degree laparoscope was placed in the abdomen to search for any signs of injury from trocar placement, and there were no signs of injury from trocar placement. The laparoscope was then used to guide two  trochars left lateral to the initial trocar, each my hand's breadth apart. The right lateral trochar was 8 mm, and the right, medial trochar ws 12 mm.   The laparoscope was also used to guide placement of an 8 mm one

## 2023-06-19 NOTE — INTERVAL H&P NOTE
Update History & Physical    The patient's History and Physical of June 19, 2023 was reviewed with the patient and I examined the patient. There was 5 pounds weight loss in 12 days. The surgical site was confirmed by the patient and me. Plan: The risks, benefits, expected outcome, and alternative to the recommended procedure have been discussed with the patient. Patient understands and wants to proceed with the procedure.      Electronically signed by Geno Waldron MD on 6/19/2023 at 11:02 AM

## 2023-06-19 NOTE — ANESTHESIA PRE PROCEDURE
Department of Anesthesiology  Preprocedure Note       Name:  Rodriguez Thurman   Age:  34 y.o.  :  1995                                          MRN:  [id number]         Date:  2023      Surgeon: Selma Calhoun):  Frank Fernando MD    Procedure: Procedure(s):  GASTRIC BYPASS RONNA-EN-Y LAPAROSCOPIC/ROBOTIC    Medications prior to admission:   Prior to Admission medications    Medication Sig Start Date End Date Taking? Authorizing Provider   topiramate (TOPAMAX) 50 MG tablet Take 1 tablet by mouth 2 times daily 23   Frank Fernando MD   naproxen sodium (ANAPROX) 550 MG tablet Take 1 tablet by mouth 2 times daily (with meals) for 10 days 3/28/23   Elsy Candelario MD       Current medications:    Current Facility-Administered Medications   Medication Dose Route Frequency Provider Last Rate Last Admin    lactated ringers IV soln infusion   IntraVENous Continuous Frank Fernando  mL/hr at 23 1021 New Bag at 23 1021    sodium chloride flush 0.9 % injection 5-40 mL  5-40 mL IntraVENous 2 times per day Frank Fernando MD        sodium chloride flush 0.9 % injection 5-40 mL  5-40 mL IntraVENous PRN Frank Fernando MD        0.9 % sodium chloride infusion   IntraVENous PRN Frank Fernando MD        ceFAZolin (ANCEF) 3000 mg in sodium chloride 0.9% 100 mL IVPB  3,000 mg IntraVENous On Call to [address] [place] [address], MD        gabapentin (NEURONTIN) capsule 400 mg  400 mg Oral Once Frank Fernando MD           Allergies: Allergies   Allergen Reactions    Tomato Rash       Problem List:    Patient Active Problem List   Diagnosis Code    Type 2 diabetes mellitus without complication, without long-term current use of insulin (HCC) E11.9    Morbid obesity ([facility] E66.01    Vitamin D deficiency E55.9    Low HDL (under 40) E78.6    Depression F32. A    Dependent edema R60.9    History of 2019 novel coronavirus disease (COVID-19)

## 2023-06-19 NOTE — ANESTHESIA PROCEDURE NOTES
Peripheral Block    Patient location during procedure: OR  Reason for block: post-op pain management and at surgeon's request  Start time: 6/19/2023 11:27 AM  End time: 6/19/2023 11:37 AM  Staffing  Performed: anesthesiologist   Anesthesiologist: Dianelys Van DO  Preanesthetic Checklist  Completed: patient identified, IV checked, site marked, risks and benefits discussed, surgical/procedural consents, equipment checked, pre-op evaluation, timeout performed, anesthesia consent given, oxygen available and monitors applied/VS acknowledged  Peripheral Block   Patient position: supine  Prep: ChloraPrep  Provider prep: mask and sterile gloves  Patient monitoring: cardiac monitor, continuous pulse ox, frequent blood pressure checks and IV access  Block type: Rectus sheath  Laterality: bilateral  Injection technique: single-shot  Guidance: ultrasound guided  Local infiltration: ropivacaine  Infiltration strength: 0.5 %  Local infiltration: ropivacaine  Dose: 10 mL    Needle   Needle type: combined needle/nerve stimulator   Needle gauge: 22 G  Needle localization: anatomical landmarks and ultrasound guidance  Needle length: 10 cm  Assessment   Injection assessment: negative aspiration for heme, no paresthesia on injection and local visualized surrounding nerve on ultrasound  Paresthesia pain: immediately resolved  Slow fractionated injection: yes  Hemodynamics: stable  Real-time US image taken/store: yes    Additional Notes  Ultrasound image printed and saved in patient chart.     Sterile probe cover used    Ropivacaine 0.5% 10 ml + saline 10 ml    10 ml bilateral    Medications Administered  ropivacaine (NAROPIN) injection 0.5% - Perineural   10 mL - 6/19/2023 11:27:00 AM

## 2023-06-19 NOTE — ANESTHESIA POSTPROCEDURE EVALUATION
Department of Anesthesiology  Postprocedure Note    Patient: Yasmine Ovalle  MRN: [id number]  [place]: 1995  Date of evaluation: 6/19/2023      Procedure Summary     Date: 06/19/23 Room / Location: [address]    Anesthesia Start: 1117 Anesthesia Stop: 8313    Procedure: GASTRIC BYPASS RONNA-EN-Y LAPAROSCOPIC/ROBOTIC Diagnosis:       Morbid obesity ([address]      Type 2 diabetes mellitus with other specified complication ([address]      Obstructive sleep apnea (adult) (pediatric)      (Morbid obesity ([address] [E66.01])      (Type 2 diabetes mellitus with other specified complication ([address] [B22.99])      (Obstructive sleep apnea (adult) (pediatric) [G47.33])    Surgeons: Geno Waldron MD Responsible Provider: Hemal Ng DO    Anesthesia Type: regional ASA Status: 3          Anesthesia Type: No value filed.     Luis Phase I: Luis Score: 7    Luis Phase II:        Anesthesia Post Evaluation    Patient location during evaluation: PACU  Patient participation: complete - patient participated  Level of consciousness: responsive to light touch  Pain score: 0  Airway patency: patent  Nausea & Vomiting: no nausea and no vomiting  Complications: no  Cardiovascular status: blood pressure returned to baseline and hemodynamically stable  Respiratory status: nonlabored ventilation, face mask, spontaneous ventilation, acceptable and airway suctioned  Hydration status: euvolemic

## 2023-06-19 NOTE — ANESTHESIA PROCEDURE NOTES
Peripheral Block    Patient location during procedure: OR  Reason for block: post-op pain management and at surgeon's request  Start time: 6/19/2023 11:27 AM  End time: 6/19/2023 11:37 AM  Staffing  Performed: anesthesiologist   Anesthesiologist: Shira Collier DO  Preanesthetic Checklist  Completed: patient identified, IV checked, site marked, risks and benefits discussed, surgical/procedural consents, equipment checked, pre-op evaluation, timeout performed, anesthesia consent given, oxygen available and monitors applied/VS acknowledged  Peripheral Block   Patient position: supine  Prep: ChloraPrep  Provider prep: mask and sterile gloves  Patient monitoring: cardiac monitor, continuous pulse ox, frequent blood pressure checks and IV access  Block type: TAP  Laterality: bilateral  Injection technique: single-shot  Guidance: ultrasound guided  Local infiltration: ropivacaine  Infiltration strength: 0.5 %  Local infiltration: ropivacaine  Dose: 50 mL    Needle   Needle type: combined needle/nerve stimulator   Needle gauge: 22 G  Needle localization: anatomical landmarks and ultrasound guidance  Needle length: 10 cm  Assessment   Injection assessment: negative aspiration for heme, no paresthesia on injection and local visualized surrounding nerve on ultrasound  Paresthesia pain: immediately resolved  Slow fractionated injection: yes  Hemodynamics: stable  Real-time US image taken/store: yes    Additional Notes  Ultrasound image printed and saved in patient chart.     Sterile probe cover used    Ropivacaine 0.5% 50 ml + lidocaine 1% with epi 1:100 k 20 ml + saline 10 ml    40 ml bilateral    Medications Administered  ropivacaine (NAROPIN) injection 0.5% - Perineural   50 mL - 6/19/2023 11:27:00 AM

## 2023-06-20 LAB
BASOPHILS # BLD: 0.1 K/UL (ref 0–0.2)
BASOPHILS NFR BLD: 0.4 %
EOSINOPHIL # BLD: 0.5 K/UL (ref 0–0.7)
EOSINOPHIL NFR BLD: 3.7 %
ERYTHROCYTE [DISTWIDTH] IN BLOOD BY AUTOMATED COUNT: 13.9 % (ref 11.5–14.5)
HCT VFR BLD AUTO: 33.7 % (ref 37–47)
HGB BLD-MCNC: 11.4 G/DL (ref 12–16)
LYMPHOCYTES # BLD: 0.4 K/UL (ref 1–4.8)
LYMPHOCYTES NFR BLD: 3.1 %
MCH RBC QN AUTO: 28.9 PG (ref 27–31.3)
MCHC RBC AUTO-ENTMCNC: 33.9 % (ref 33–37)
MCV RBC AUTO: 85.3 FL (ref 79.4–94.8)
MONOCYTES # BLD: 1 K/UL (ref 0.2–0.8)
MONOCYTES NFR BLD: 7.1 %
NEUTROPHILS # BLD: 12.5 K/UL (ref 1.4–6.5)
NEUTS SEG NFR BLD: 85.7 %
PLATELET # BLD AUTO: 350 K/UL (ref 130–400)
RBC # BLD AUTO: 3.95 M/UL (ref 4.2–5.4)
WBC # BLD AUTO: 14.5 K/UL (ref 4.8–10.8)

## 2023-06-20 PROCEDURE — 6370000000 HC RX 637 (ALT 250 FOR IP): Performed by: SURGERY

## 2023-06-20 PROCEDURE — 6360000002 HC RX W HCPCS: Performed by: SURGERY

## 2023-06-20 PROCEDURE — 36415 COLL VENOUS BLD VENIPUNCTURE: CPT

## 2023-06-20 PROCEDURE — 2580000003 HC RX 258: Performed by: SURGERY

## 2023-06-20 PROCEDURE — 2700000000 HC OXYGEN THERAPY PER DAY

## 2023-06-20 PROCEDURE — 85025 COMPLETE CBC W/AUTO DIFF WBC: CPT

## 2023-06-20 PROCEDURE — 1210000000 HC MED SURG R&B

## 2023-06-20 RX ORDER — KETOROLAC TROMETHAMINE 30 MG/ML
30 INJECTION, SOLUTION INTRAMUSCULAR; INTRAVENOUS ONCE
Status: COMPLETED | OUTPATIENT
Start: 2023-06-20 | End: 2023-06-20

## 2023-06-20 RX ORDER — GABAPENTIN 400 MG/1
400 CAPSULE ORAL 2 TIMES DAILY
Status: DISCONTINUED | OUTPATIENT
Start: 2023-06-20 | End: 2023-06-20

## 2023-06-20 RX ADMIN — HYDROMORPHONE HYDROCHLORIDE 1 MG: 1 INJECTION, SOLUTION INTRAMUSCULAR; INTRAVENOUS; SUBCUTANEOUS at 03:49

## 2023-06-20 RX ADMIN — ACETAMINOPHEN 650 MG: 325 TABLET ORAL at 18:08

## 2023-06-20 RX ADMIN — SODIUM CHLORIDE: 9 INJECTION, SOLUTION INTRAVENOUS at 18:11

## 2023-06-20 RX ADMIN — SODIUM CHLORIDE: 9 INJECTION, SOLUTION INTRAVENOUS at 00:07

## 2023-06-20 RX ADMIN — CEFAZOLIN 3000 MG: 10 INJECTION, POWDER, FOR SOLUTION INTRAVENOUS at 03:09

## 2023-06-20 RX ADMIN — ONDANSETRON 4 MG: 2 INJECTION INTRAMUSCULAR; INTRAVENOUS at 03:49

## 2023-06-20 RX ADMIN — SODIUM CHLORIDE: 9 INJECTION, SOLUTION INTRAVENOUS at 09:57

## 2023-06-20 RX ADMIN — Medication 10 ML: at 10:39

## 2023-06-20 RX ADMIN — KETOROLAC TROMETHAMINE 30 MG: 30 INJECTION, SOLUTION INTRAMUSCULAR; INTRAVENOUS at 09:43

## 2023-06-20 RX ADMIN — HYDROMORPHONE HYDROCHLORIDE 1 MG: 1 INJECTION, SOLUTION INTRAMUSCULAR; INTRAVENOUS; SUBCUTANEOUS at 00:05

## 2023-06-20 RX ADMIN — HEPARIN SODIUM 5000 UNITS: 5000 INJECTION INTRAVENOUS; SUBCUTANEOUS at 09:47

## 2023-06-20 RX ADMIN — ONDANSETRON 4 MG: 2 INJECTION INTRAMUSCULAR; INTRAVENOUS at 09:55

## 2023-06-20 RX ADMIN — HEPARIN SODIUM 5000 UNITS: 5000 INJECTION INTRAVENOUS; SUBCUTANEOUS at 20:46

## 2023-06-20 RX ADMIN — ACETAMINOPHEN 650 MG: 325 TABLET ORAL at 12:35

## 2023-06-20 ASSESSMENT — PAIN SCALES - GENERAL
PAINLEVEL_OUTOF10: 7
PAINLEVEL_OUTOF10: 2
PAINLEVEL_OUTOF10: 4

## 2023-06-20 ASSESSMENT — PAIN DESCRIPTION - LOCATION
LOCATION: ABDOMEN

## 2023-06-20 ASSESSMENT — PAIN DESCRIPTION - DESCRIPTORS
DESCRIPTORS: CRAMPING
DESCRIPTORS: ACHING;CRAMPING
DESCRIPTORS: CRAMPING

## 2023-06-20 ASSESSMENT — PAIN DESCRIPTION - ORIENTATION: ORIENTATION: INNER

## 2023-06-20 ASSESSMENT — PAIN - FUNCTIONAL ASSESSMENT
PAIN_FUNCTIONAL_ASSESSMENT: ACTIVITIES ARE NOT PREVENTED
PAIN_FUNCTIONAL_ASSESSMENT: ACTIVITIES ARE NOT PREVENTED

## 2023-06-20 NOTE — CARE COORDINATION
Case Management Assessment  Initial Evaluation    Date/Time of Evaluation: 6/20/2023 2:57 PM  Assessment Completed by: KEVIN Junior, DEENAW    If patient is discharged prior to next notation, then this note serves as note for discharge by case management. Patient Name: Shyla Lord                   YOB: 1995  Diagnosis: Morbid obesity ([facility] [address] [E66.01]  Type 2 diabetes mellitus with other specified complication ([facility] [address] [facility] [N41.86]  Obstructive sleep apnea (adult) (pediatric) [G47.33]                   Date / Time: 6/19/2023 10:02 AM    Patient Admission Status: Inpatient   Readmission Risk (Low < 19, Mod (19-27), High > 27): Readmission Risk Score: 2.9    Current PCP: Nilam Jeronimo MD  PCP verified by CM? Yes    Chart Reviewed: Yes      History Provided by: Patient  Patient Orientation: Alert and Oriented    Patient Cognition: Alert    Hospitalization in the last 30 days (Readmission):  No    If yes, Readmission Assessment in CM Navigator will be completed. Advance Directives:      Code Status: Full Code   Patient's Primary Decision Maker is:        Discharge Planning:    Patient lives with: Family Members Type of Home: House  Primary Care Giver: Self  Patient Support Systems include: Family Members   Current Financial resources:    Current community resources:    Current services prior to admission: None            Current DME:              Type of Home Care services:  None    ADLS  Prior functional level: Independent in ADLs/IADLs  Current functional level: Independent in ADLs/IADLs    PT AM-PAC:   /24  OT AM-PAC:   /24    Family can provide assistance at DC: Yes  Would you like Case Management to discuss the discharge plan with any other family members/significant others, and if so, who?  No  Plans to Return to Present Housing:    Other Identified Issues/Barriers to RETURNING to current housing: Medical Complications   Potential Assistance needed at discharge: N/A           Potential DME:

## 2023-06-21 VITALS
BODY MASS INDEX: 38.08 KG/M2 | SYSTOLIC BLOOD PRESSURE: 157 MMHG | OXYGEN SATURATION: 99 % | RESPIRATION RATE: 16 BRPM | TEMPERATURE: 98.4 F | DIASTOLIC BLOOD PRESSURE: 90 MMHG | HEIGHT: 70 IN | WEIGHT: 266 LBS | HEART RATE: 72 BPM

## 2023-06-21 DIAGNOSIS — Z98.84 S/P GASTRIC BYPASS: Primary | ICD-10-CM

## 2023-06-21 PROCEDURE — 2580000003 HC RX 258: Performed by: SURGERY

## 2023-06-21 PROCEDURE — 6360000002 HC RX W HCPCS: Performed by: SURGERY

## 2023-06-21 PROCEDURE — 6370000000 HC RX 637 (ALT 250 FOR IP): Performed by: SURGERY

## 2023-06-21 RX ORDER — ONDANSETRON 4 MG/1
4 TABLET, FILM COATED ORAL DAILY PRN
Qty: 30 TABLET | Refills: 0 | Status: SHIPPED | OUTPATIENT
Start: 2023-06-21

## 2023-06-21 RX ORDER — OMEPRAZOLE 20 MG/1
20 CAPSULE, DELAYED RELEASE ORAL
Qty: 30 CAPSULE | Refills: 5 | Status: SHIPPED | OUTPATIENT
Start: 2023-06-21

## 2023-06-21 RX ORDER — DOCUSATE SODIUM 100 MG/1
100 CAPSULE, LIQUID FILLED ORAL 2 TIMES DAILY
Qty: 60 CAPSULE | Refills: 0 | Status: SHIPPED | OUTPATIENT
Start: 2023-06-21 | End: 2023-07-21

## 2023-06-21 RX ORDER — OXYCODONE HYDROCHLORIDE 5 MG/1
5 CAPSULE ORAL EVERY 4 HOURS PRN
Qty: 20 CAPSULE | Refills: 0 | Status: SHIPPED | OUTPATIENT
Start: 2023-06-21 | End: 2023-06-21

## 2023-06-21 RX ORDER — OXYCODONE HYDROCHLORIDE 5 MG/1
5 TABLET ORAL EVERY 6 HOURS PRN
Qty: 12 TABLET | Refills: 0 | Status: SHIPPED | OUTPATIENT
Start: 2023-06-21 | End: 2023-06-24

## 2023-06-21 RX ORDER — GABAPENTIN 100 MG/1
100 CAPSULE ORAL 2 TIMES DAILY
Qty: 40 CAPSULE | Refills: 0 | Status: SHIPPED | OUTPATIENT
Start: 2023-06-21 | End: 2023-07-11

## 2023-06-21 RX ORDER — POLYETHYLENE GLYCOL 3350 17 G/17G
17 POWDER, FOR SOLUTION ORAL DAILY
Qty: 56 G | Refills: 1 | Status: SHIPPED | OUTPATIENT
Start: 2023-06-21 | End: 2023-07-21

## 2023-06-21 RX ORDER — ACETAMINOPHEN 500 MG
500 TABLET ORAL 4 TIMES DAILY PRN
Qty: 120 TABLET | Refills: 0 | Status: SHIPPED | OUTPATIENT
Start: 2023-06-21

## 2023-06-21 RX ADMIN — HEPARIN SODIUM 5000 UNITS: 5000 INJECTION INTRAVENOUS; SUBCUTANEOUS at 09:22

## 2023-06-21 RX ADMIN — ACETAMINOPHEN 650 MG: 325 TABLET ORAL at 01:07

## 2023-06-21 RX ADMIN — SODIUM CHLORIDE: 9 INJECTION, SOLUTION INTRAVENOUS at 04:15

## 2023-06-21 RX ADMIN — ACETAMINOPHEN 650 MG: 325 TABLET ORAL at 05:33

## 2023-06-21 RX ADMIN — Medication 10 ML: at 09:22

## 2023-06-21 ASSESSMENT — PAIN DESCRIPTION - LOCATION
LOCATION: ABDOMEN
LOCATION: ABDOMEN

## 2023-06-21 ASSESSMENT — PAIN SCALES - GENERAL
PAINLEVEL_OUTOF10: 4
PAINLEVEL_OUTOF10: 6
PAINLEVEL_OUTOF10: 3

## 2023-06-21 ASSESSMENT — PAIN DESCRIPTION - DESCRIPTORS: DESCRIPTORS: CRAMPING

## 2023-06-21 ASSESSMENT — PAIN - FUNCTIONAL ASSESSMENT: PAIN_FUNCTIONAL_ASSESSMENT: ACTIVITIES ARE NOT PREVENTED

## 2023-06-21 NOTE — DISCHARGE SUMMARY
Patient ID  Darnell Lamb   [id number]  1995     Admit date: 6/19/2023    Discharge date and time: No discharge date for patient encounter. Admitting Physician: Addy Lizarraga MD     Discharge Physician: Veronique Liu    Admission Diagnoses: Morbid obesity ([facility] [address] [E66.01]  Type 2 diabetes mellitus with other specified complication ([facility] [address] [P26.28]  Obstructive sleep apnea (adult) (pediatric) [G47.33]    Discharge Diagnoses: Morbid obesity    Admission Condition: fair    Discharged Condition: fair    Indication for Admission: morbid obesity    Surgical Procedures: Robotic gastric bypass. Hospital Course: Hospital Course: Patient was admitted to the surgical brown following the above operation. She recovered uneventfully on the surgical brown. At the time of discharge she was tolerating a Bariatric clear liquid diet, pain was controlled on PO medications, and she was ambulating and voiding. Consults: none    Discharge Exam:  Gen: alert, NAD  Lungs: Breathing comfortably on RA. No tachypnea, retractions, or increased WOB. Abd: soft, ND, appropriately tender  Incision:  healing well    Disposition: home    Patient Instructions:   See DC instructions    Activity: Do not lift anything heavier than 15 pounds for 2 weeks. Diet: Bariatric full liquids  Wound Care: may shower. Cover staples with band aids.     Addy Lizarraga MD   6/21/2023  1:18 PM      Cathye Bamberger, MD, [place], FACS, [facility] Verified Surgeon  LCDR-USN-RET, Diplomate of The American Board of Surgery

## 2023-06-21 NOTE — PROGRESS NOTES
Prescription switched to tablets per Pharmacy request.    Marcus Singleton MD, FACS, [facility]

## 2023-06-21 NOTE — PROGRESS NOTES
Assumed care of patient. She is resting in bed at this time. She denies nausea. Currently taking small sips of broth, encouraged to increase fluid intake. Denies other needs at this time. Call light in reach.
Four lap sites are clean dry and intact. JAVIER drain site also clean and intact, 20 cc drained of dark red drainage. Patient was having nausea Zofran was given. Pain was at a 7, scheduled Toradol was given. Patent encouraged to drink water from med cups per orders. Med's given per MAR. Patient is voiding and was up walking the gamez this morning. Patient denied any further needs and I continued to monitor. Call light within reach and bed in lowest position.  Electronically signed by Manuel Vee RN on 6/20/2023 at 11:11 AM
Large bandaids x4 intact and dry to abdomen. Bandaid around JAVIER site also dry. 75cc dark red drainage from JAVIER. Took one ice chip per hour only intermittently through the night. Medicated once during the shift for nausea and reported relief. Offered assistance to bathroom several times, patient declined citing no urge to void. Bladder non distended. Stood at this side of the bed this a.m. and tolerated very well.
Patient stated she had pain of 3 and had mild nausea. Patient refused pain med's and Zofran stating that both her nausea and pain were manageable. VS, assessment completed and required med's given per [facility]. Patient is tolerating fluids well. Lap sites and JAVIER drain site are all dry clean and intact. JAVIER drain in draining as required. Patient ambulated in the gamez this morning and tolerated it well. Patient denied any further needs and I continued to monitor. Call light is within reach and bed is in lowest position.  Electronically signed by Bassem Escudero RN on 6/21/2023 at 10:51 AM
Pt continues to be drowsy, states having pain but it's tolerable, pt resting comfortably, no crying, no moaning at this time
Pt does not have home cpap/bipap unit.
Pt resting comfortably, no signs of pain or nausea, pt having hard time keeping eyes open for conversation, vss
Pt states she does not have home cpap
Pt tolerating water and crystal light without nausea. Has used Tylenol only for pain control  JAVIER drained 55cc dark red drainage.
Pt when aroused states abd pain 8/10, but then falls asleep and is resting comfortable at this time
although offered. She doesn't seem motivated to discharge. I encouraged her to start Bariatric clear liquids. I would normally start scheduled gabapentin, but she seems to sedated.      Amalia Dowd MD, WARRENMBS, TERA, [facility] Verified Surgeon  [email or web address]-RET, Diplomate of The American Board of Surgery

## 2023-06-21 NOTE — DISCHARGE INSTRUCTIONS
INSTRUCTIONS FOR YOUR CARE AFTER SURGERY  You may remove the bandages 48 hours after your surgery. You may shower any time. Do NOT take baths, use swimming pools, or use hot tubs for 2 weeks. You may use ice packs on your incisions as needed for pain. You may take Tylenol as needed for pain, in addition to the pain medication that has been prescribed for you. Follow the directions on the bottle. Do not consume more than 3,500 mg of acetaminophen (Tylenol) a day. Walk a little bit every hour while awake. Follow your Bariatric Full Liquid Diet for 2 weeks, then proceed to the Bariatric Pureed Diet. Try to get at least 64 ouncs of liquid in each day. Do NOT lift anything more than 15 pounds (about a bag of groceries) or play contact sports for 2 weeks. Do NOT drive if you are taking narcotic pain medication. You may shower the day after discharge. Replace band aids after showering  Keep your head elevated 30 degrees with sleep to avoid aspirating in your sleep. SEEK MEDICAL CARE IF:   There is redness, swelling, or increasing pain in the wound. There is fluid (pus) coming from the wound. You have an oral temperature above 102° F (38.9° C). You notice a bad smell coming from the wound or dressing. The wounds break open. You develop dizzy episodes or fainting while standing. SEEK IMMEDIATE MEDICAL CARE IF:   You develop a rash. You have difficulty breathing. You develop a reaction or have side effects to medicines you were given. You have new pain in your calf muscles or swelling in your legs. Call Dr. Chow Deal office [phone] if you have any questions or concerns.

## 2023-06-22 ENCOUNTER — TELEPHONE (OUTPATIENT)
Dept: BARIATRICS/WEIGHT MGMT | Age: 28
End: 2023-06-22

## 2023-06-22 NOTE — TELEPHONE ENCOUNTER
Bariatric Post-Op Discharge Follow-Up Call    Type of Surgery: Gastric Bypass Hector-en-y Laparoscopic/robotic  Date of Surgery: 6/19/2023  Hospital Discharge Date: 6/21/23    Post-op outreach call made to pt on 6/22/2023. Nausea/vomiting present Yes, taking Zofran as instructed  Fever >101/chills No  Chest pain/dyspnea/tachycardia Yes, chest pain this morning when laying down after drinking water. States has resolved. Instructed patient not to lay flat after drinking. Dysphagia to liquids No  Pain only at the incision site. Patient rates pain at a 5      Pt reports frequent ambulation around home. Intake is described as Fair  Fluid intake: <48 oz/day, 16 oz fluids so far this morning. Protein intake: Patient states  was unaware she was supposed to start full liquid diet. Instructed patient to refer to discharge instructions and education manual regarding full liquid diet. Pt reports urine output of at least 4 times in a 24 hour period. Passing flatus. BM No . Agrees to monitor for BM. Patient reports laparoscopic incision sites  no drainage and staples intact. Drain removed prior to hospital discharge. Drainage at site: None    Lovenox No  Taking as directed. Reviewed S/S of bleeding. Checking BP  No .   Checking Blood Sugar No .  Last BG reading           Have you scheduled post-op appointment with primary care provider?   Yes     Future Appointments   Date Time Provider Chidi Lowery   6/23/2023 11:00 AM MD Francisco Gunderson [address]   7/5/2023  8:00 AM Hailee South MD jagjit Choudhury [address]   7/5/2023 10:00 AM Herman Buckley MD [address] [place] [address]   7/6/2023  9:30 AM SCHEDULE, Darvin Miller DIETICIAN [facility]   7/20/2023  9:00 AM Herman Buckley MD [facility]   7/20/2023  9:30 AM SCHEDULE, Juan Sweeney BARIATRICS DIETICIAN [facility]   12/20/2023  8:30 AM Yulia Mcfarlane MD [address] [facility]

## 2023-06-23 ENCOUNTER — OFFICE VISIT (OUTPATIENT)
Dept: FAMILY MEDICINE CLINIC | Age: 28
End: 2023-06-23
Payer: COMMERCIAL

## 2023-06-23 VITALS
BODY MASS INDEX: 37.65 KG/M2 | HEIGHT: 70 IN | SYSTOLIC BLOOD PRESSURE: 136 MMHG | HEART RATE: 62 BPM | TEMPERATURE: 97.6 F | DIASTOLIC BLOOD PRESSURE: 88 MMHG | WEIGHT: 263 LBS | OXYGEN SATURATION: 99 %

## 2023-06-23 DIAGNOSIS — Z98.84 STATUS POST GASTRIC BYPASS FOR OBESITY: Chronic | ICD-10-CM

## 2023-06-23 DIAGNOSIS — E55.9 VITAMIN D DEFICIENCY: ICD-10-CM

## 2023-06-23 DIAGNOSIS — E66.01 MORBID OBESITY (HCC): ICD-10-CM

## 2023-06-23 DIAGNOSIS — E11.9 TYPE 2 DIABETES MELLITUS WITHOUT COMPLICATION, WITHOUT LONG-TERM CURRENT USE OF INSULIN (HCC): Primary | ICD-10-CM

## 2023-06-23 PROCEDURE — 99214 OFFICE O/P EST MOD 30 MIN: CPT | Performed by: FAMILY MEDICINE

## 2023-06-23 PROCEDURE — G8427 DOCREV CUR MEDS BY ELIG CLIN: HCPCS | Performed by: FAMILY MEDICINE

## 2023-06-23 PROCEDURE — 2022F DILAT RTA XM EVC RTNOPTHY: CPT | Performed by: FAMILY MEDICINE

## 2023-06-23 PROCEDURE — 3044F HG A1C LEVEL LT 7.0%: CPT | Performed by: FAMILY MEDICINE

## 2023-06-23 PROCEDURE — 1036F TOBACCO NON-USER: CPT | Performed by: FAMILY MEDICINE

## 2023-06-23 PROCEDURE — G8417 CALC BMI ABV UP PARAM F/U: HCPCS | Performed by: FAMILY MEDICINE

## 2023-06-23 PROCEDURE — 1111F DSCHRG MED/CURRENT MED MERGE: CPT | Performed by: FAMILY MEDICINE

## 2023-06-23 ASSESSMENT — ENCOUNTER SYMPTOMS
SHORTNESS OF BREATH: 0
ABDOMINAL PAIN: 0
CONSTIPATION: 0
VOMITING: 0
DIARRHEA: 0
COUGH: 0
CHEST TIGHTNESS: 0
BLOOD IN STOOL: 0
ANAL BLEEDING: 0
WHEEZING: 0

## 2023-06-23 NOTE — ASSESSMENT & PLAN NOTE
Most recent vitamin D level low. We will repeat lab work to determine if any further supplement dose adjustments are indicated.

## 2023-06-23 NOTE — PROGRESS NOTES
stable overall since hospital discharge. She reports tolerating PO fluids and full liquid diet without problems. There are no fevers, chills, sweats, and patient denies any chest pain, dyspnea, palpitations, lightheadedness, dizziness, worsening lower extremity edema, or syncope. There has been some mild intermittent nausea, but no vomiting and patient has not required antinausea medication. She denies any abdominal pain other that soreness about incision sites and denies any diarrhea or constipation. Current Outpatient Medications on File Prior to Visit   Medication Sig Dispense Refill    acetaminophen (TYLENOL) 500 MG tablet Take 1 tablet by mouth 4 times daily as needed for Pain 120 tablet 0    gabapentin (NEURONTIN) 100 MG capsule Take 1 capsule by mouth 2 times daily for 20 days. Intended supply: 90 days 40 capsule 0    omeprazole (PRILOSEC) 20 MG delayed release capsule Take 1 capsule by mouth every morning (before breakfast) 30 capsule 5    docusate sodium (COLACE) 100 MG capsule Take 1 capsule by mouth 2 times daily 60 capsule 0    ondansetron (ZOFRAN) 4 MG tablet Take 1 tablet by mouth daily as needed for Nausea or Vomiting 30 tablet 0    polyethylene glycol (GLYCOLAX) 17 GM/SCOOP powder Take 17 g by mouth daily 56 g 1    oxyCODONE (ROXICODONE) 5 MG immediate release tablet Take 1 tablet by mouth every 6 hours as needed for Pain for up to 3 days. Intended supply: 3 days. Take lowest dose possible to manage pain Max Daily Amount: 20 mg 12 tablet 0     No current facility-administered medications on file prior to visit. Allergies   Allergen Reactions    Tomato Rash        Review of Systems   Constitutional:  Negative for appetite change, chills, diaphoresis, fatigue, fever and unexpected weight change. Eyes:  Negative for visual disturbance. Respiratory:  Negative for cough, chest tightness, shortness of breath and wheezing.     Cardiovascular:  Negative for chest pain, palpitations and leg

## 2023-06-23 NOTE — ASSESSMENT & PLAN NOTE
Patient is status post metabolic weight loss surgery in the past week. She was encouraged to follow protocols and restrictions as outlined by the surgeon in terms of activity level and in terms of diet. We will continue to monitor over time.

## 2023-07-05 ENCOUNTER — OFFICE VISIT (OUTPATIENT)
Dept: BARIATRICS/WEIGHT MGMT | Age: 28
End: 2023-07-05

## 2023-07-05 VITALS
HEART RATE: 80 BPM | SYSTOLIC BLOOD PRESSURE: 128 MMHG | BODY MASS INDEX: 36.21 KG/M2 | HEIGHT: 69 IN | WEIGHT: 244.5 LBS | DIASTOLIC BLOOD PRESSURE: 74 MMHG | OXYGEN SATURATION: 98 %

## 2023-07-05 DIAGNOSIS — Z98.84 S/P GASTRIC BYPASS: Primary | ICD-10-CM

## 2023-07-05 PROCEDURE — 99024 POSTOP FOLLOW-UP VISIT: CPT | Performed by: SURGERY

## 2023-07-05 RX ORDER — APREPITANT 125 MG/1
125 CAPSULE ORAL ONCE
Qty: 1 CAPSULE | Refills: 0 | COMMUNITY
Start: 2023-07-05 | End: 2023-07-05

## 2023-07-05 RX ORDER — PROMETHAZINE HYDROCHLORIDE 25 MG/1
25 TABLET ORAL 4 TIMES DAILY PRN
Qty: 20 TABLET | Refills: 0 | Status: SHIPPED | OUTPATIENT
Start: 2023-07-05 | End: 2023-07-12

## 2023-07-05 NOTE — PATIENT INSTRUCTIONS
Make the following appointments:        THE BIG FOUR RULES:  1. Count calories! People count calories eat less. Use the daily plate or other apps for your smart phone or computer. The more you count the more of an expert you will become and will get easier and easier. If you are trying to lose weight and exercising a lot, keep calories to around the thousand to 1200 a day. If you are not exercising consistently try to limit them to around 800 a day. 2.  Separate liquids and solids. Wait 30 minutes to an hour after you eat before drinking liquids. This will reduce the total amount of food you eat in the meal.    3.  Exercise! You need up to 5 hours a week with a combination of cardio and resistance or weight training in order to keep excess body fat off.    4.  Sleep! Try to get 7 or more hours of sleep a night. If you have obstructive sleep apnea definitely use your CPAP machine. THE RULE OF 20'S:  For every meal, chew each bite 20 seconds. Then put the fork down and wait 20 seconds after you swallow before picking up the fork again. Each meal should take at least 20 minutes so your brain can register that you are full. Take Emend for nausea and then begin vitamins and calcium citrate. Try to get 80 grams of protein a day. I recommend you check blood sugars.

## 2023-07-06 ENCOUNTER — NURSE ONLY (OUTPATIENT)
Dept: BARIATRICS/WEIGHT MGMT | Age: 28
End: 2023-07-06

## 2023-07-06 VITALS — HEIGHT: 68 IN | BODY MASS INDEX: 36.95 KG/M2 | WEIGHT: 243.8 LBS

## 2023-07-18 ENCOUNTER — HOSPITAL ENCOUNTER (OUTPATIENT)
Dept: LAB | Age: 28
Discharge: HOME | End: 2023-07-18
Payer: COMMERCIAL

## 2023-07-18 DIAGNOSIS — E11.9 TYPE 2 DIABETES MELLITUS WITHOUT COMPLICATION, WITHOUT LONG-TERM CURRENT USE OF INSULIN (HCC): ICD-10-CM

## 2023-07-18 DIAGNOSIS — E66.01 MORBID OBESITY (HCC): ICD-10-CM

## 2023-07-18 DIAGNOSIS — E55.9 VITAMIN D DEFICIENCY: ICD-10-CM

## 2023-07-18 DIAGNOSIS — Z98.84 STATUS POST GASTRIC BYPASS FOR OBESITY: Chronic | ICD-10-CM

## 2023-07-18 LAB
ALBUMIN SERPL-MCNC: 4 G/DL (ref 3.5–4.6)
ALP SERPL-CCNC: 92 U/L (ref 40–130)
ALT SERPL-CCNC: 62 U/L (ref 0–33)
ANION GAP SERPL CALCULATED.3IONS-SCNC: 20 MEQ/L (ref 9–15)
AST SERPL-CCNC: 19 U/L (ref 0–35)
BASOPHILS # BLD: 0 K/UL (ref 0–0.2)
BASOPHILS NFR BLD: 0.4 %
BILIRUB SERPL-MCNC: 0.6 MG/DL (ref 0.2–0.7)
BUN SERPL-MCNC: 11 MG/DL (ref 6–20)
CALCIUM SERPL-MCNC: 9.3 MG/DL (ref 8.5–9.9)
CHLORIDE SERPL-SCNC: 105 MEQ/L (ref 95–107)
CHOLEST SERPL-MCNC: 83 MG/DL (ref 0–199)
CO2 SERPL-SCNC: 15 MEQ/L (ref 20–31)
CREAT SERPL-MCNC: 0.69 MG/DL (ref 0.5–0.9)
EOSINOPHIL # BLD: 0.1 K/UL (ref 0–0.7)
EOSINOPHIL NFR BLD: 1.3 %
ERYTHROCYTE [DISTWIDTH] IN BLOOD BY AUTOMATED COUNT: 15.3 % (ref 11.5–14.5)
GLOBULIN SER CALC-MCNC: 3 G/DL (ref 2.3–3.5)
GLUCOSE SERPL-MCNC: 67 MG/DL (ref 70–99)
HBA1C MFR BLD: 5.8 % (ref 4.8–5.9)
HCT VFR BLD AUTO: 37.9 % (ref 37–47)
HDLC SERPL-MCNC: 28 MG/DL (ref 40–59)
HGB BLD-MCNC: 12.5 G/DL (ref 12–16)
LDLC SERPL CALC-MCNC: 35 MG/DL (ref 0–129)
LYMPHOCYTES # BLD: 1.5 K/UL (ref 1–4.8)
LYMPHOCYTES NFR BLD: 18.5 %
MCH RBC QN AUTO: 28.5 PG (ref 27–31.3)
MCHC RBC AUTO-ENTMCNC: 33 % (ref 33–37)
MCV RBC AUTO: 86.6 FL (ref 79.4–94.8)
MONOCYTES # BLD: 0.6 K/UL (ref 0.2–0.8)
MONOCYTES NFR BLD: 7.1 %
NEUTROPHILS # BLD: 5.7 K/UL (ref 1.4–6.5)
NEUTS SEG NFR BLD: 72.7 %
PLATELET # BLD AUTO: 241 K/UL (ref 130–400)
POTASSIUM SERPL-SCNC: 4.1 MEQ/L (ref 3.4–4.9)
PROT SERPL-MCNC: 7 G/DL (ref 6.3–8)
RBC # BLD AUTO: 4.38 M/UL (ref 4.2–5.4)
SODIUM SERPL-SCNC: 140 MEQ/L (ref 135–144)
TRIGL SERPL-MCNC: 101 MG/DL (ref 0–150)
VITAMIN D 25-HYDROXY: 21.3 NG/ML
WBC # BLD AUTO: 7.9 K/UL (ref 4.8–10.8)

## 2023-07-18 PROCEDURE — 80061 LIPID PANEL: CPT

## 2023-07-18 PROCEDURE — 36415 COLL VENOUS BLD VENIPUNCTURE: CPT

## 2023-07-18 PROCEDURE — 85025 COMPLETE CBC W/AUTO DIFF WBC: CPT

## 2023-07-18 PROCEDURE — 82306 VITAMIN D 25 HYDROXY: CPT

## 2023-07-18 PROCEDURE — 83036 HEMOGLOBIN GLYCOSYLATED A1C: CPT

## 2023-07-18 PROCEDURE — 80053 COMPREHEN METABOLIC PANEL: CPT

## 2023-07-20 ENCOUNTER — OFFICE VISIT (OUTPATIENT)
Dept: BARIATRICS/WEIGHT MGMT | Age: 28
End: 2023-07-20

## 2023-07-20 ENCOUNTER — NURSE ONLY (OUTPATIENT)
Dept: BARIATRICS/WEIGHT MGMT | Age: 28
End: 2023-07-20

## 2023-07-20 VITALS
WEIGHT: 237.6 LBS | SYSTOLIC BLOOD PRESSURE: 126 MMHG | HEIGHT: 68 IN | BODY MASS INDEX: 36.01 KG/M2 | HEART RATE: 77 BPM | DIASTOLIC BLOOD PRESSURE: 70 MMHG | OXYGEN SATURATION: 99 %

## 2023-07-20 DIAGNOSIS — Z98.84 S/P GASTRIC BYPASS: Primary | ICD-10-CM

## 2023-07-20 DIAGNOSIS — E66.01 MORBID OBESITY (HCC): ICD-10-CM

## 2023-07-20 DIAGNOSIS — E11.9 TYPE 2 DIABETES MELLITUS WITHOUT COMPLICATION, WITHOUT LONG-TERM CURRENT USE OF INSULIN (HCC): Primary | ICD-10-CM

## 2023-07-20 DIAGNOSIS — E55.9 VITAMIN D DEFICIENCY: ICD-10-CM

## 2023-07-20 PROCEDURE — 99024 POSTOP FOLLOW-UP VISIT: CPT | Performed by: SURGERY

## 2023-07-20 RX ORDER — ERGOCALCIFEROL 1.25 MG/1
50000 CAPSULE ORAL WEEKLY
Qty: 12 CAPSULE | Refills: 1 | Status: SHIPPED | OUTPATIENT
Start: 2023-07-20

## 2023-07-26 DIAGNOSIS — E55.9 VITAMIN D DEFICIENCY: Primary | ICD-10-CM

## 2023-07-26 RX ORDER — ACETAMINOPHEN 160 MG
1 TABLET,DISINTEGRATING ORAL DAILY
Qty: 90 CAPSULE | Refills: 1 | Status: SHIPPED | OUTPATIENT
Start: 2023-07-26

## 2023-09-08 ENCOUNTER — TELEPHONE (OUTPATIENT)
Dept: FAMILY MEDICINE CLINIC | Age: 28
End: 2023-09-08

## 2023-09-14 NOTE — TELEPHONE ENCOUNTER
Patient was seen for a routine physical on 01/04/23. If her form is simply to confirm/document that a yearly physical was done then she can drop it off and I will fill it out.

## 2023-09-14 NOTE — TELEPHONE ENCOUNTER
Patient is calling again about her paperwork that she had asked to get a call back on this and is still waiting. She would like a call back from the practice. We tried to reach the office but could not get anyone.

## 2023-09-29 ENCOUNTER — OFFICE VISIT (OUTPATIENT)
Dept: BARIATRICS/WEIGHT MGMT | Age: 28
End: 2023-09-29

## 2023-09-29 ENCOUNTER — OFFICE VISIT (OUTPATIENT)
Dept: BARIATRICS/WEIGHT MGMT | Age: 28
End: 2023-09-29
Payer: COMMERCIAL

## 2023-09-29 VITALS
BODY MASS INDEX: 31.4 KG/M2 | SYSTOLIC BLOOD PRESSURE: 130 MMHG | HEIGHT: 68 IN | DIASTOLIC BLOOD PRESSURE: 88 MMHG | OXYGEN SATURATION: 100 % | WEIGHT: 207.2 LBS | HEART RATE: 54 BPM

## 2023-09-29 VITALS — HEIGHT: 68 IN | WEIGHT: 207.2 LBS | BODY MASS INDEX: 31.4 KG/M2

## 2023-09-29 DIAGNOSIS — T85.898A ANASTOMOTIC ULCER S/P GASTRIC BYPASS: Primary | ICD-10-CM

## 2023-09-29 DIAGNOSIS — E66.9 OBESITY (BMI 30.0-34.9): Primary | ICD-10-CM

## 2023-09-29 DIAGNOSIS — K28.9 ANASTOMOTIC ULCER S/P GASTRIC BYPASS: Primary | ICD-10-CM

## 2023-09-29 DIAGNOSIS — F50.81 BINGE-EATING DISORDER, IN FULL REMISSION, MILD: ICD-10-CM

## 2023-09-29 DIAGNOSIS — Z98.84 S/P GASTRIC BYPASS: ICD-10-CM

## 2023-09-29 DIAGNOSIS — F40.10 SOCIAL ANXIETY DISORDER: ICD-10-CM

## 2023-09-29 PROCEDURE — 1036F TOBACCO NON-USER: CPT | Performed by: SURGERY

## 2023-09-29 PROCEDURE — 99214 OFFICE O/P EST MOD 30 MIN: CPT | Performed by: SURGERY

## 2023-09-29 PROCEDURE — 1036F TOBACCO NON-USER: CPT | Performed by: PSYCHOLOGIST

## 2023-09-29 PROCEDURE — G8417 CALC BMI ABV UP PARAM F/U: HCPCS | Performed by: SURGERY

## 2023-09-29 PROCEDURE — G8427 DOCREV CUR MEDS BY ELIG CLIN: HCPCS | Performed by: SURGERY

## 2023-09-29 PROCEDURE — 90832 PSYTX W PT 30 MINUTES: CPT | Performed by: PSYCHOLOGIST

## 2023-09-29 ASSESSMENT — PATIENT HEALTH QUESTIONNAIRE - PHQ9
4. FEELING TIRED OR HAVING LITTLE ENERGY: 0
SUM OF ALL RESPONSES TO PHQ QUESTIONS 1-9: 1
3. TROUBLE FALLING OR STAYING ASLEEP: 1
5. POOR APPETITE OR OVEREATING: 0
SUM OF ALL RESPONSES TO PHQ QUESTIONS 1-9: 1
9. THOUGHTS THAT YOU WOULD BE BETTER OFF DEAD, OR OF HURTING YOURSELF: 0
SUM OF ALL RESPONSES TO PHQ9 QUESTIONS 1 & 2: 0
7. TROUBLE CONCENTRATING ON THINGS, SUCH AS READING THE NEWSPAPER OR WATCHING TELEVISION: 0
10. IF YOU CHECKED OFF ANY PROBLEMS, HOW DIFFICULT HAVE THESE PROBLEMS MADE IT FOR YOU TO DO YOUR WORK, TAKE CARE OF THINGS AT HOME, OR GET ALONG WITH OTHER PEOPLE: 0
6. FEELING BAD ABOUT YOURSELF - OR THAT YOU ARE A FAILURE OR HAVE LET YOURSELF OR YOUR FAMILY DOWN: 0
SUM OF ALL RESPONSES TO PHQ QUESTIONS 1-9: 1
1. LITTLE INTEREST OR PLEASURE IN DOING THINGS: 0
SUM OF ALL RESPONSES TO PHQ QUESTIONS 1-9: 1
2. FEELING DOWN, DEPRESSED OR HOPELESS: 0
8. MOVING OR SPEAKING SO SLOWLY THAT OTHER PEOPLE COULD HAVE NOTICED. OR THE OPPOSITE, BEING SO FIGETY OR RESTLESS THAT YOU HAVE BEEN MOVING AROUND A LOT MORE THAN USUAL: 0

## 2023-09-29 ASSESSMENT — ANXIETY QUESTIONNAIRES
GAD7 TOTAL SCORE: 1
IF YOU CHECKED OFF ANY PROBLEMS ON THIS QUESTIONNAIRE, HOW DIFFICULT HAVE THESE PROBLEMS MADE IT FOR YOU TO DO YOUR WORK, TAKE CARE OF THINGS AT HOME, OR GET ALONG WITH OTHER PEOPLE: NOT DIFFICULT AT ALL
7. FEELING AFRAID AS IF SOMETHING AWFUL MIGHT HAPPEN: 0
1. FEELING NERVOUS, ANXIOUS, OR ON EDGE: 0
6. BECOMING EASILY ANNOYED OR IRRITABLE: 0
5. BEING SO RESTLESS THAT IT IS HARD TO SIT STILL: 0
3. WORRYING TOO MUCH ABOUT DIFFERENT THINGS: 1
2. NOT BEING ABLE TO STOP OR CONTROL WORRYING: 0
4. TROUBLE RELAXING: 0

## 2023-09-29 NOTE — PROGRESS NOTES
BARIATRIC POST-OPERATIVE BEHAVIORAL HEALTH FOLLOW UP  Susan Jurado Psy.D., [address] [facility]  Licensed Clinical Psychologist (HELENE [id number])        Name: Hailee Welch  Date of Birth 1995  Visit Date: 9/29/2023   Time spent with Patient: 30 minutes. Patient provided informed consent for behavioral health intervention. Discussed with patient the limits of confidentiality (i.e., abuse reporting, suicide intervention, review by treatment team, review by insurance company, etc.) and purpose of treatment. Patient indicated understanding. Lalo Garcia presents for 3-month post-operative follow up with Bariatric Psychologist.    Initial Surgical Consultation 280.0 lbs BMI: 42.57   Pre-surgical Visit 271.4 lbs BMI: 41.27   1 Month Post-op 237.6 lbs BMI: 36.13   3 Months Post-op 207.2 lbs BMI: 31.50     Kaitlin Posey has lost 72.8 pounds since the initial surgical consultation with Dr. Cailin Lance, and 64.2 pounds since completion of the RYGB procedure. Kaitlin Posey reports she is doing well overall and stated she is happy with her decision to undergo surgery. \"I've got mor energy than I've ever had, it's almost annoying sometimes. \"    Purchased an air fryer and has been utilizing this tool frequently    Mood: \"Good\"  Sleep: decreased; noted more frequent awakenings throughout the night  Appetite: \"Sometimes I don't have one, or if I eat something then I'm full the whole day. \"    Eating Habits: \"I'm not eating full meals, I mainly just focus on trying to eat the protein. \" Notes she tries to have breakfast, which may just be a protein shake, then snacks on high protein items throughout the day; often does not have appetite in the evening. She is not tracking intake. Snacking: pepperoni bites, cheese  Water: approx.  64 oz/day  Protein: \"I know I'm not reaching the goal\"  Vitamins: taking bariatric MVI and calcium citrate as instructed  Alcohol: none  Caffeine: none  Activity/Exercise: has been trail running

## 2023-09-29 NOTE — PATIENT INSTRUCTIONS
It is possible to gain weight back after surgery; the equation of energy-in vs. energy-out will always apply. Bariatric surgery is a powerful tool that will only be successful in the long term if you use it properly. Returning to old behaviors such as snacking, grazing, drinking carbonated beverages, drinking alcohol, choosing high-calorie foods, eating and drinking at the same time, or even not exercising will have weight gain effects. The good habits you form during the first year will be the foundation that helps you stay successful for the long term. Don't weight yourself daily; once per week is sufficient! Remember, the number on the scale is not the whole story -your body composition is changing. So it is also important to pay attention to other factors, like how your clothes are fitting, how you feel, etc.    Attend support group meetings, as available. Patients who belong to a support group are most likely to succeed in their weight loss regimen and maintain their weight loss long term. Attend all follow-up appointments: Regular follow-up appointments with the weight management office are important for monitoring your progress, addressing any concerns or challenges, and provide ongoing guidance and support. These appointments can also help you stay motivated and on track with your weight loss goals. attend follow-up appointments: Regular follow-up appointments with the weight management office are important for monitoring your progress, addressing any concerns or challenges, and provide ongoing guidance and support. These appointments can also help you stay motivated and on track with your weight loss goals. Regular exercise is important for maintaining weight loss and improving overall health. Start with low-impact exercises and gradually increase your intensity as your body adapts and heals.  Aim for a well-rounded exercise routine that includes cardiovascular exercises (e.g., walking, swimming,

## 2023-09-29 NOTE — PATIENT INSTRUCTIONS
Keep up the excellent work!!      THE BIG FOUR RULES:  1. Count calories! People count calories eat less. Use the daily plate or other apps for your smart phone or computer. The more you count the more of an expert you will become and will get easier and easier. If you are trying to lose weight and exercising a lot, keep calories to around the thousand to 1200 a day. If you are not exercising consistently try to limit them to around 800 a day. 2.  Separate liquids and solids. Wait 30 minutes to an hour after you eat before drinking liquids. This will reduce the total amount of food you eat in the meal.    3.  Exercise! You need up to 5 hours a week with a combination of cardio and resistance or weight training in order to keep excess body fat off.    4.  Sleep! Try to get 7 or more hours of sleep a night. If you have obstructive sleep apnea definitely use your CPAP machine. THE RULE OF 20'S:  For every meal, chew each bite 20 seconds. Then put the fork down and wait 20 seconds after you swallow before picking up the fork again. Each meal should take at least 20 minutes so your brain can register that you are full.

## 2024-01-02 ENCOUNTER — HOSPITAL ENCOUNTER (OUTPATIENT)
Dept: LAB | Age: 29
Discharge: HOME | End: 2024-01-02
Payer: COMMERCIAL

## 2024-01-02 DIAGNOSIS — Z98.84 S/P GASTRIC BYPASS: ICD-10-CM

## 2024-01-02 DIAGNOSIS — E55.9 VITAMIN D DEFICIENCY: ICD-10-CM

## 2024-01-02 PROCEDURE — 83540 ASSAY OF IRON: CPT

## 2024-01-02 PROCEDURE — 82607 VITAMIN B-12: CPT

## 2024-01-02 PROCEDURE — 84425 ASSAY OF VITAMIN B-1: CPT

## 2024-01-02 PROCEDURE — 82330 ASSAY OF CALCIUM: CPT

## 2024-01-02 PROCEDURE — 82306 VITAMIN D 25 HYDROXY: CPT

## 2024-01-02 PROCEDURE — 36415 COLL VENOUS BLD VENIPUNCTURE: CPT

## 2024-01-02 PROCEDURE — 83970 ASSAY OF PARATHORMONE: CPT

## 2024-01-02 PROCEDURE — 83550 IRON BINDING TEST: CPT

## 2024-01-03 LAB
IRON SATURATION: 30 % (ref 20–55)
IRON: 85 UG/DL (ref 37–145)
TOTAL IRON BINDING CAPACITY: 287 UG/DL (ref 250–450)
UNSATURATED IRON BINDING CAPACITY: 202 UG/DL (ref 112–347)
VITAMIN B-12: 1154 PG/ML (ref 232–1245)
VITAMIN D 25-HYDROXY: 34.9 NG/ML

## 2024-01-04 LAB
CA-I ADJ PH7.4 SERPL-SCNC: 1.25 MMOL/L (ref 1.09–1.3)
CA-I SERPL ISE-SCNC: 1.2 MMOL/L (ref 1.09–1.3)
COMMENT: NORMAL
MISCELLANEOUS LAB TEST RESULT: NORMAL

## 2024-01-06 LAB — VIT B1 SERPL-MCNC: 16 NMOL/L (ref 4–15)

## 2024-01-12 ENCOUNTER — TELEPHONE (OUTPATIENT)
Dept: OBGYN CLINIC | Age: 29
End: 2024-01-12

## 2024-01-12 NOTE — TELEPHONE ENCOUNTER
Patient called to go over pap results that were done on 12/20/23, patient would like a call Monday to go over results. Thank you

## 2024-01-16 ENCOUNTER — TELEPHONE (OUTPATIENT)
Dept: OBGYN CLINIC | Age: 29
End: 2024-01-16

## 2024-01-16 NOTE — TELEPHONE ENCOUNTER
Patient called to go over PAP results, please advise, patient would like a return call today  if possible. Thank you

## 2024-02-07 ENCOUNTER — OFFICE VISIT (OUTPATIENT)
Dept: BARIATRICS/WEIGHT MGMT | Age: 29
End: 2024-02-07
Payer: COMMERCIAL

## 2024-02-07 ENCOUNTER — OFFICE VISIT (OUTPATIENT)
Dept: FAMILY MEDICINE CLINIC | Age: 29
End: 2024-02-07
Payer: COMMERCIAL

## 2024-02-07 VITALS
HEIGHT: 68 IN | BODY MASS INDEX: 27.28 KG/M2 | HEART RATE: 52 BPM | DIASTOLIC BLOOD PRESSURE: 60 MMHG | WEIGHT: 180 LBS | SYSTOLIC BLOOD PRESSURE: 122 MMHG | OXYGEN SATURATION: 99 %

## 2024-02-07 VITALS — HEIGHT: 68 IN | BODY MASS INDEX: 27.28 KG/M2 | WEIGHT: 180 LBS

## 2024-02-07 VITALS
SYSTOLIC BLOOD PRESSURE: 118 MMHG | DIASTOLIC BLOOD PRESSURE: 62 MMHG | BODY MASS INDEX: 27.22 KG/M2 | OXYGEN SATURATION: 98 % | WEIGHT: 179 LBS | HEART RATE: 55 BPM

## 2024-02-07 DIAGNOSIS — Z71.3 DIETARY COUNSELING AND SURVEILLANCE: ICD-10-CM

## 2024-02-07 DIAGNOSIS — E11.9 TYPE 2 DIABETES MELLITUS WITHOUT COMPLICATION, WITHOUT LONG-TERM CURRENT USE OF INSULIN (HCC): ICD-10-CM

## 2024-02-07 DIAGNOSIS — Z00.00 WELL ADULT EXAM: Primary | ICD-10-CM

## 2024-02-07 DIAGNOSIS — Z98.84 S/P GASTRIC BYPASS: Primary | ICD-10-CM

## 2024-02-07 PROBLEM — Z87.42 HISTORY OF ABNORMAL CERVICAL PAP SMEAR: Status: ACTIVE | Noted: 2024-02-07

## 2024-02-07 PROBLEM — E66.01 MORBID OBESITY (HCC): Status: RESOLVED | Noted: 2021-05-17 | Resolved: 2024-02-07

## 2024-02-07 PROCEDURE — G8484 FLU IMMUNIZE NO ADMIN: HCPCS | Performed by: FAMILY MEDICINE

## 2024-02-07 PROCEDURE — 99395 PREV VISIT EST AGE 18-39: CPT | Performed by: FAMILY MEDICINE

## 2024-02-07 PROCEDURE — 1036F TOBACCO NON-USER: CPT | Performed by: SURGERY

## 2024-02-07 PROCEDURE — G8428 CUR MEDS NOT DOCUMENT: HCPCS | Performed by: DIETITIAN, REGISTERED

## 2024-02-07 PROCEDURE — 99215 OFFICE O/P EST HI 40 MIN: CPT | Performed by: SURGERY

## 2024-02-07 PROCEDURE — G8417 CALC BMI ABV UP PARAM F/U: HCPCS | Performed by: DIETITIAN, REGISTERED

## 2024-02-07 PROCEDURE — G8427 DOCREV CUR MEDS BY ELIG CLIN: HCPCS | Performed by: SURGERY

## 2024-02-07 PROCEDURE — G8417 CALC BMI ABV UP PARAM F/U: HCPCS | Performed by: SURGERY

## 2024-02-07 PROCEDURE — 97803 MED NUTRITION INDIV SUBSEQ: CPT | Performed by: DIETITIAN, REGISTERED

## 2024-02-07 PROCEDURE — G8484 FLU IMMUNIZE NO ADMIN: HCPCS | Performed by: SURGERY

## 2024-02-07 RX ORDER — DOXYCYCLINE HYCLATE 100 MG/1
CAPSULE ORAL
COMMUNITY
Start: 2024-01-29

## 2024-02-07 RX ORDER — SPIRONOLACTONE 50 MG/1
TABLET, FILM COATED ORAL
COMMUNITY
Start: 2024-01-29

## 2024-02-07 ASSESSMENT — PATIENT HEALTH QUESTIONNAIRE - PHQ9
7. TROUBLE CONCENTRATING ON THINGS, SUCH AS READING THE NEWSPAPER OR WATCHING TELEVISION: NOT AT ALL
SUM OF ALL RESPONSES TO PHQ QUESTIONS 1-9: 0
3. TROUBLE FALLING OR STAYING ASLEEP: 0
6. FEELING BAD ABOUT YOURSELF - OR THAT YOU ARE A FAILURE OR HAVE LET YOURSELF OR YOUR FAMILY DOWN: 0
8. MOVING OR SPEAKING SO SLOWLY THAT OTHER PEOPLE COULD HAVE NOTICED. OR THE OPPOSITE, BEING SO FIGETY OR RESTLESS THAT YOU HAVE BEEN MOVING AROUND A LOT MORE THAN USUAL: 0
SUM OF ALL RESPONSES TO PHQ QUESTIONS 1-9: 0
3. TROUBLE FALLING OR STAYING ASLEEP: NOT AT ALL
8. MOVING OR SPEAKING SO SLOWLY THAT OTHER PEOPLE COULD HAVE NOTICED. OR THE OPPOSITE - BEING SO FIDGETY OR RESTLESS THAT YOU HAVE BEEN MOVING AROUND A LOT MORE THAN USUAL: NOT AT ALL
2. FEELING DOWN, DEPRESSED OR HOPELESS: 0
10. IF YOU CHECKED OFF ANY PROBLEMS, HOW DIFFICULT HAVE THESE PROBLEMS MADE IT FOR YOU TO DO YOUR WORK, TAKE CARE OF THINGS AT HOME, OR GET ALONG WITH OTHER PEOPLE: NOT DIFFICULT AT ALL
9. THOUGHTS THAT YOU WOULD BE BETTER OFF DEAD, OR OF HURTING YOURSELF: NOT AT ALL
9. THOUGHTS THAT YOU WOULD BE BETTER OFF DEAD, OR OF HURTING YOURSELF: 0
4. FEELING TIRED OR HAVING LITTLE ENERGY: 0
7. TROUBLE CONCENTRATING ON THINGS, SUCH AS READING THE NEWSPAPER OR WATCHING TELEVISION: 0
SUM OF ALL RESPONSES TO PHQ QUESTIONS 1-9: 0
4. FEELING TIRED OR HAVING LITTLE ENERGY: NOT AT ALL
1. LITTLE INTEREST OR PLEASURE IN DOING THINGS: NOT AT ALL
6. FEELING BAD ABOUT YOURSELF - OR THAT YOU ARE A FAILURE OR HAVE LET YOURSELF OR YOUR FAMILY DOWN: NOT AT ALL
SUM OF ALL RESPONSES TO PHQ QUESTIONS 1-9: 0
5. POOR APPETITE OR OVEREATING: 0
1. LITTLE INTEREST OR PLEASURE IN DOING THINGS: 0
SUM OF ALL RESPONSES TO PHQ9 QUESTIONS 1 & 2: 0
SUM OF ALL RESPONSES TO PHQ QUESTIONS 1-9: 0
10. IF YOU CHECKED OFF ANY PROBLEMS, HOW DIFFICULT HAVE THESE PROBLEMS MADE IT FOR YOU TO DO YOUR WORK, TAKE CARE OF THINGS AT HOME, OR GET ALONG WITH OTHER PEOPLE: 0
2. FEELING DOWN, DEPRESSED OR HOPELESS: NOT AT ALL
5. POOR APPETITE OR OVEREATING: NOT AT ALL

## 2024-02-07 ASSESSMENT — ENCOUNTER SYMPTOMS
ABDOMINAL DISTENTION: 0
SHORTNESS OF BREATH: 0
COUGH: 0
VOMITING: 0
SORE THROAT: 0
WHEEZING: 0
COLOR CHANGE: 0
NAUSEA: 0
BLOOD IN STOOL: 0
EYE DISCHARGE: 0
EYE PAIN: 0
RHINORRHEA: 0
CHEST TIGHTNESS: 0
SINUS PRESSURE: 0
ABDOMINAL PAIN: 0
ANAL BLEEDING: 0
DIARRHEA: 0
CONSTIPATION: 0
TROUBLE SWALLOWING: 0

## 2024-02-07 NOTE — PROGRESS NOTES
Post Op Clinic Visit      Jumana Thayer  is 6 months  post-op from Robotic Hector en Y gastric bypass.  .  Abdominal pain has been absent.  Blood sugars are normal with no meds.    Weight:       Wt Readings from Last 1 Encounters:   02/07/24 81.2 kg (179 lb)      BMI:       BMI Readings from Last 1 Encounters:   02/07/24 27.22 kg/m²         Pre-Surgery Weight: 266 lbs  TBW lost: 86 lbs  % EBW lost: 84 %     Patient has lost a total of 100 lbs from start of program.     Labs reviewed: yes, WNL     Intolerances/Difficulties:  not meeting protein goals     Vitamins: Bariatric Fusion MVI with iron 1/day, Bariatric Fusion Calcium 2/day     Protein goal: not met, 40-45 gr/day     Fluid goal: met, 64 oz/day     Exercise: strength training 60 minutes 3 days/week, inconsistent cardio     Nutrition Diagnosis:  PES: Inadequate protein intake related to volume restriction s/p gastric bypass as evidenced by protein intake of 40-45 gr/day per verbal recall     Nutrition Intervention:  Education provided to include review of post surgical diet progression, meal timing, protein and fluid intake, vitamins, and exercise.      Nutrition Monitoring and evaluation:  Protein intake: 76-95 grams/day (1.2-1.5gr/kg IBW)  Fluid intake: 64-90oz sugar free, calorie free, caffeine free, non carbonated beverages  Vitamins: Bariatric MVI with iron,  Calcium citrate 1000-1500mg/day in divided doses  Exercise: 45-60 minutes of exercise 5 days/week, including at least 2 days of strength training     Plan/Recommendation:   Add additional 40 gr protein/day by replacing snacks with Greek yogurt and protein shake  Keep food journal at least 2-3 days/week  Increase consistency of cardio exercise       Physical Exam:    /60   Pulse 52   Ht 1.727 m (5' 8\")   Wt 81.6 kg (180 lb)   SpO2 99%   BMI 27.37 kg/m²     General: No acute distress  HEENT: P PERRLA, no scleral icterus.  Trachea midline.  Thoracic: Clear to auscultation

## 2024-02-07 NOTE — PROGRESS NOTES
movements intact.      Conjunctiva/sclera: Conjunctivae normal.      Pupils: Pupils are equal, round, and reactive to light.   Neck:      Thyroid: No thyroid mass, thyromegaly or thyroid tenderness.      Vascular: No carotid bruit.   Cardiovascular:      Rate and Rhythm: Normal rate and regular rhythm.      Pulses:           Dorsalis pedis pulses are 2+ on the right side and 2+ on the left side.        Posterior tibial pulses are 2+ on the right side and 2+ on the left side.      Heart sounds: Normal heart sounds, S1 normal and S2 normal. No murmur heard.  Pulmonary:      Effort: Pulmonary effort is normal. No tachypnea, accessory muscle usage or respiratory distress.      Breath sounds: Normal breath sounds. No wheezing, rhonchi or rales.   Chest:      Chest wall: No tenderness.   Abdominal:      General: Bowel sounds are normal. There is no distension.      Palpations: Abdomen is soft. There is no mass.      Tenderness: There is no abdominal tenderness. There is no right CVA tenderness, left CVA tenderness, guarding or rebound.   Musculoskeletal:         General: No tenderness or deformity. Normal range of motion.      Cervical back: Normal range of motion and neck supple.      Right lower leg: No edema.      Left lower leg: No edema.   Feet:      Right foot:      Protective Sensation: 10 sites tested.  10 sites sensed.      Skin integrity: Callus and dry skin present. No ulcer, blister, skin breakdown, erythema, warmth or fissure.      Left foot:      Protective Sensation: 10 sites tested.  10 sites sensed.      Skin integrity: Callus and dry skin present. No ulcer, blister, skin breakdown, erythema, warmth or fissure.   Lymphadenopathy:      Cervical: No cervical adenopathy.      Upper Body:      Right upper body: No supraclavicular adenopathy.      Left upper body: No supraclavicular adenopathy.   Skin:     General: Skin is warm.      Coloration: Skin is not jaundiced.      Findings: No rash.      Nails: There

## 2024-02-07 NOTE — PROGRESS NOTES
Medical Nutrition Therapy  [facility]    Patient here for her 6 month post op Hector en Y gastric bypass.       Patient reports:    2/7/2024  Height:   Ht Readings from Last 1 Encounters:   12/20/23 1.727 m (5' 8\")     Weight:   Wt Readings from Last 1 Encounters:   02/07/24 81.2 kg (179 lb)     BMI:   BMI Readings from Last 1 Encounters:   02/07/24 27.22 kg/m²       Pre-Surgery Weight: 266 lbs  TBW lost: 86 lbs  % EBW lost: 84 %    Patient has lost a total of 100 lbs from start of program.    Labs reviewed: yes, WNL    Intolerances/Difficulties:  not meeting protein goals    Vitamins: Bariatric Fusion MVI with iron 1/day, Bariatric Fusion Calcium 2/day    Protein goal: not met, 40-45 gr/day    Fluid goal: met, 64 oz/day     Exercise: strength training 60 minutes 3 days/week, inconsistent cardio    Nutrition Diagnosis:  PES: Inadequate protein intake related to volume restriction s/p gastric bypass as evidenced by protein intake of 40-45 gr/day per verbal recall    Nutrition Intervention:  Education provided to include review of post surgical diet progression, meal timing, protein and fluid intake, vitamins, and exercise.     Nutrition Monitoring and evaluation:  Protein intake: 76-95 grams/day (1.2-1.5gr/kg IBW)  Fluid intake: 64-90oz sugar free, calorie free, caffeine free, non carbonated beverages  Vitamins: Bariatric MVI with iron,  Calcium citrate 1000-1500mg/day in divided doses  Exercise: 45-60 minutes of exercise 5 days/week, including at least 2 days of strength training    Plan/Recommendation:   Add additional 40 gr protein/day by replacing snacks with Greek yogurt and protein shake  Keep food journal at least 2-3 days/week  Increase consistency of cardio exercise    Time spent with patient 30 minutes    Follow up per program protocol    Radha Ramirez RD

## 2024-02-07 NOTE — PATIENT INSTRUCTIONS
Obtain labs before follow up appointment in 6 months.    THE BIG FOUR GUIDELINES:    1.  Count calories!  People count calories eat less.  Use the daily plate or other apps for your smart phone or computer.  The more you count the more of an expert you will become and will get easier and easier.  If you are trying to lose weight and exercising a lot, keep calories to around the thousand to 1200 a day.  If you are not exercising consistently try to limit them to around 800 a day.    2.  Separate liquids and solids.  Wait 30 minutes to an hour after you eat before drinking liquids.  This will reduce the total amount of food you eat in the meal.    3.  Exercise!  Optimally,  up to 5 hours a week with a combination of cardio and resistance or weight training will dramatically help to keep excess body fat off.  But any bit of exercise makes a big, long-term difference.    4.  Sleep!   Try to get 7 or more hours of sleep a night.  If you have obstructive sleep apnea definitely use your CPAP machine.    THE RULE OF 20'S:  For every meal, chew each bite 20 seconds.  Then put the fork down and wait 20 seconds after you swallow before picking up the fork again.  Each meal should take at least 20 minutes so your brain can register that you are full.    If you are happy with your care, please go  to [email or web address] and leave a review for Ivan Wise MD.  The charitable nature of our practice makes a marketing budget a challenge and your review could help us help more people.

## 2024-05-21 ENCOUNTER — TELEPHONE (OUTPATIENT)
Dept: OBGYN CLINIC | Age: 29
End: 2024-05-21

## 2024-05-21 RX ORDER — FLUCONAZOLE 150 MG/1
150 TABLET ORAL DAILY
Qty: 3 TABLET | Refills: 0 | Status: CANCELLED | OUTPATIENT
Start: 2024-05-21 | End: 2024-05-24

## 2024-05-22 NOTE — PROGRESS NOTES
Steve Chong (: 1995) is a 24 y.o. female, Established patient, who presents today for:    Chief Complaint   Patient presents with    Annual Exam     Patient is present for annual exam          ASSESSMENT/PLAN    1. Well adult exam  Comments:  29-year-old female with exam as listed below  2. Type 2 diabetes mellitus without complication, without long-term current use of insulin (HCC)  -     POCT glycosylated hemoglobin (Hb A1C)  -     [facility] - Gael Valdivia MD, Ophthalmology, [facility]  3. Vitamin D deficiency  -     Vitamin D 25 Hydroxy; Future  4. Morbid obesity ([facility] [address]  Assessment & Plan:  Patient counseled on healthy dietary choices and the benefits of a lower salt and/or lower carbohydrate diet as appropriate. Patient also counseled on benefits of moderate intensity cardiovascular exercise for 150 minutes per week as they are able. Advice was given to make small changes over time, setting smaller achievable goals until recommended lifestyle changes are reached. Patient has established care with [address] and is going through the process of being evaluated for metabolic weight loss procedure. Return in about 6 months (around 2023) for Chronic Disease Check. SUBJECTIVE/OBJECTIVE:    HPI    Patient presents for routine well visit    No current outpatient medications on file prior to visit.      Current Facility-Administered Medications on File Prior to Visit   Medication Dose Route Frequency Provider Last Rate Last Admin    lactated ringers infusion   IntraVENous Continuous Nadege Castro MD        sodium chloride flush 0.9 % injection 10 mL  10 mL IntraVENous 2 times per day Sridevi Hamm MD        sodium chloride flush 0.9 % injection 10 mL  10 mL IntraVENous PRN Nadege Castro MD        nalbuphine (NUBAIN) injection 10 mg  10 mg IntraVENous Q2H PRN Nadege Castro MD        oxytocin (PITOCIN) 30 units in 500 mL infusion  1 israel-units/min IntraVENous Continuous Campos Carrillo MD        simethicone (MYLICON) chewable tablet 80 mg  80 mg Oral Q6H PRN Nadege Castro MD        docusate sodium (COLACE) capsule 100 mg  100 mg Oral BID Nadege Castro MD        magnesium hydroxide (MILK OF MAGNESIA) 400 MG/5ML suspension 30 mL  30 mL Oral Daily PRN Campos Carrillo MD        bisacodyl (DULCOLAX) suppository 10 mg  10 mg Rectal Daily PRN Nadege Castro MD        ondansetron (ZOFRAN) injection 4 mg  4 mg IntraVENous Q6H PRN Nadege Castro MD        famotidine (PEPCID) injection 20 mg  20 mg IntraVENous BID Nadege Castro MD        oxytocin (PITOCIN) 30 units in 500 mL infusion  1 israel-units/min IntraVENous Continuous PRN Nadege Castro MD           Allergies   Allergen Reactions    Tomato Rash        Review of Systems   Constitutional:  Negative for appetite change, chills, diaphoresis, fatigue, fever and unexpected weight change. HENT:  Negative for congestion, ear pain, postnasal drip, rhinorrhea, sinus pressure, sore throat and trouble swallowing. Eyes:  Negative for pain, discharge and visual disturbance. Respiratory:  Negative for cough, chest tightness, shortness of breath and wheezing. Cardiovascular:  Negative for chest pain, palpitations and leg swelling. No orthopnea, No PND   Gastrointestinal:  Positive for abdominal pain (intermittent lower abdominal cramping associated with diarrhea) and diarrhea (intermittent, associated with abdominal cramping). Negative for abdominal distention, anal bleeding, blood in stool, constipation, nausea and vomiting. No heartburn, No melena   Endocrine: Negative for cold intolerance, heat intolerance, polydipsia, polyphagia and polyuria. Genitourinary:  Negative for dysuria, flank pain, frequency, hematuria and urgency. Musculoskeletal:  Negative for gait problem and myalgias. Skin:  Negative for color change and rash.    Neurological:  Negative for dizziness, tremors, seizures, syncope, facial asymmetry, speech difficulty, weakness, light-headedness, numbness and headaches. Hematological:  Negative for adenopathy. Psychiatric/Behavioral:  Negative for dysphoric mood. The patient is not nervous/anxious. Vitals:  /88 (Site: Right Upper Arm, Position: Sitting, Cuff Size: Large Adult)   Pulse 77   Temp 97.7 °F (36.5 °C) (Temporal)   Wt 278 lb 3.2 oz (126.2 kg)   SpO2 98%   BMI 42.30 kg/m²     Results for POC orders placed in visit on 01/04/23   POCT glycosylated hemoglobin (Hb A1C)   Result Value Ref Range    Hemoglobin A1C 5.9 %         Physical Exam  Vitals reviewed. Constitutional:       General: She is not in acute distress. Appearance: Normal appearance. She is well-developed. She is obese. She is not diaphoretic. HENT:      Head: Normocephalic and atraumatic. Salivary Glands: Right salivary gland is not diffusely enlarged or tender. Left salivary gland is not diffusely enlarged or tender. Right Ear: Tympanic membrane, ear canal and external ear normal. No middle ear effusion. Tympanic membrane is not erythematous. Left Ear: Tympanic membrane, ear canal and external ear normal.  No middle ear effusion. Tympanic membrane is not erythematous. Nose: Nose normal. No congestion. Right Sinus: No maxillary sinus tenderness or frontal sinus tenderness. Left Sinus: No maxillary sinus tenderness or frontal sinus tenderness. Mouth/Throat:      Lips: Pink. No lesions. Mouth: Mucous membranes are moist. No oral lesions. Tongue: No lesions. Palate: No mass and lesions. Pharynx: Oropharynx is clear. No oropharyngeal exudate or posterior oropharyngeal erythema. Eyes:      General: No scleral icterus. Right eye: No discharge. Left eye: No discharge. Extraocular Movements: Extraocular movements intact. Conjunctiva/sclera: Conjunctivae normal.      Pupils: Pupils are equal, round, and reactive to light.    Neck: Thyroid: No thyroid mass, thyromegaly or thyroid tenderness. Vascular: No carotid bruit. Cardiovascular:      Rate and Rhythm: Normal rate and regular rhythm. Pulses:           Posterior tibial pulses are 2+ on the right side and 2+ on the left side. Heart sounds: Normal heart sounds, S1 normal and S2 normal. No murmur heard. Pulmonary:      Effort: Pulmonary effort is normal. No tachypnea, accessory muscle usage or respiratory distress. Breath sounds: Normal breath sounds. No wheezing, rhonchi or rales. Chest:      Chest wall: No tenderness. Abdominal:      General: Bowel sounds are normal. There is no distension. Palpations: Abdomen is soft. There is no mass. Tenderness: There is no abdominal tenderness. There is no right CVA tenderness, left CVA tenderness, guarding or rebound. Musculoskeletal:         General: No tenderness or deformity. Normal range of motion. Cervical back: Normal range of motion and neck supple. Right lower leg: No edema. Left lower leg: No edema. Lymphadenopathy:      Cervical: No cervical adenopathy. Upper Body:      Right upper body: No supraclavicular adenopathy. Left upper body: No supraclavicular adenopathy. Skin:     General: Skin is warm. Coloration: Skin is not jaundiced. Findings: No rash. Nails: There is no clubbing. Neurological:      Mental Status: She is alert and oriented to person, place, and time. Cranial Nerves: No cranial nerve deficit. Sensory: Sensation is intact. No sensory deficit. Motor: Motor function is intact. No weakness or abnormal muscle tone. Psychiatric:         Mood and Affect: Mood and affect normal.         Speech: Speech normal.         Behavior: Behavior normal.         Thought Content: Thought content normal.       Ortho Exam (If Applicable)              An electronic signature was used to authenticate this note.      Queta Armas MD normal S1, S2 heard

## 2024-06-06 RX ORDER — FLUCONAZOLE 150 MG/1
TABLET ORAL
Qty: 3 TABLET | Refills: 0 | Status: SHIPPED | OUTPATIENT
Start: 2024-06-06

## 2024-06-06 NOTE — ASSESSMENT & PLAN NOTE
Med pended for approval   Patient encouraged to continue with counseling/therapy as currently established. We reviewed options for medication management, patient continues to decline pharmacologic intervention. There is no reported SI/HI or self harming behaviors. Patient denies any problems with anxiety or panic. We will continue to monitor closely over time, patient was encouraged to contact the office should she change her mind in the future about medication management for depressed mood.

## 2024-08-10 ENCOUNTER — HOSPITAL ENCOUNTER (EMERGENCY)
Age: 29
Discharge: HOME | End: 2024-08-10
Attending: EMERGENCY MEDICINE

## 2024-08-10 VITALS
HEIGHT: 69 IN | RESPIRATION RATE: 16 BRPM | BODY MASS INDEX: 27.7 KG/M2 | OXYGEN SATURATION: 100 % | DIASTOLIC BLOOD PRESSURE: 95 MMHG | WEIGHT: 187 LBS | HEART RATE: 71 BPM | TEMPERATURE: 98.4 F | SYSTOLIC BLOOD PRESSURE: 147 MMHG

## 2024-08-10 DIAGNOSIS — K04.7 DENTAL ABSCESS: Primary | ICD-10-CM

## 2024-08-10 PROCEDURE — 99283 EMERGENCY DEPT VISIT LOW MDM: CPT

## 2024-08-10 PROCEDURE — 41800 DRAINAGE OF GUM LESION: CPT

## 2024-08-10 PROCEDURE — 6370000000 HC RX 637 (ALT 250 FOR IP): Performed by: EMERGENCY MEDICINE

## 2024-08-10 RX ORDER — AMOXICILLIN AND CLAVULANATE POTASSIUM 875; 125 MG/1; MG/1
1 TABLET, FILM COATED ORAL 2 TIMES DAILY
Qty: 14 TABLET | Refills: 0 | Status: SHIPPED | OUTPATIENT
Start: 2024-08-10 | End: 2024-08-17

## 2024-08-10 RX ORDER — AMOXICILLIN AND CLAVULANATE POTASSIUM 875; 125 MG/1; MG/1
1 TABLET, FILM COATED ORAL ONCE
Status: COMPLETED | OUTPATIENT
Start: 2024-08-10 | End: 2024-08-10

## 2024-08-10 RX ADMIN — AMOXICILLIN AND CLAVULANATE POTASSIUM 1 TABLET: 875; 125 TABLET, FILM COATED ORAL at 17:09

## 2024-08-10 ASSESSMENT — ENCOUNTER SYMPTOMS
CHEST TIGHTNESS: 0
FACIAL SWELLING: 0
WHEEZING: 0
COUGH: 0
PHOTOPHOBIA: 0
SHORTNESS OF BREATH: 0
EYE DISCHARGE: 0
ABDOMINAL PAIN: 0
ABDOMINAL DISTENTION: 0
SORE THROAT: 0
VOMITING: 0

## 2024-08-10 ASSESSMENT — LIFESTYLE VARIABLES
HOW OFTEN DO YOU HAVE A DRINK CONTAINING ALCOHOL: NEVER
HOW MANY STANDARD DRINKS CONTAINING ALCOHOL DO YOU HAVE ON A TYPICAL DAY: PATIENT DOES NOT DRINK

## 2024-08-10 ASSESSMENT — PAIN - FUNCTIONAL ASSESSMENT: PAIN_FUNCTIONAL_ASSESSMENT: NONE - DENIES PAIN

## 2024-08-10 NOTE — ED NOTES
Patient given dc instructions and advised where to  her prescription.  Verbalized understanding.  Pt ambulatory out of er with steady gait.

## 2024-08-10 NOTE — ED PROVIDER NOTES
[facility] ED  eMERGENCY dEPARTMENT eNCOUnter      Pt Name: Jumana Thayer  MRN: [id number]  Birthdate 1995  Date of evaluation: 8/10/2024  Provider: Ariel Van MD    CHIEF COMPLAINT       Chief Complaint   Patient presents with   • Dental Pain     Dental abscess x4 days.         HISTORY OF PRESENT ILLNESS   (Location/Symptom, Timing/Onset,Context/Setting, Quality, Duration, Modifying Factors, Severity)  Note limiting factors.   Jumana Thayer is a 29 y.o. female who presents to the emergency department for evaluation of what she thinks is a dental abscess.  Patient just had her braces removed this past week and noticed what she thought was an abscess or a bump on her left upper gumline.  No active drainage.  Mild tenderness.  No other complaints    HPI    NursingNotes were reviewed.    REVIEW OF SYSTEMS    (2-9 systems for level 4, 10 or more for level 5)     Review of Systems   Constitutional:  Negative for chills and diaphoresis.   HENT:  Negative for congestion, ear pain, facial swelling, mouth sores and sore throat.    Eyes:  Negative for photophobia and discharge.   Respiratory:  Negative for cough, chest tightness, shortness of breath and wheezing.    Cardiovascular:  Negative for chest pain and palpitations.   Gastrointestinal:  Negative for abdominal distention, abdominal pain and vomiting.   Endocrine: Negative for cold intolerance.   Genitourinary:  Negative for difficulty urinating.   Musculoskeletal:  Negative for arthralgias.   Skin:  Negative for pallor and rash.   Allergic/Immunologic: Negative for immunocompromised state.   Neurological:  Negative for dizziness and syncope.   Hematological:  Negative for adenopathy.   Psychiatric/Behavioral:  Negative for agitation and hallucinations.    All other systems reviewed and are negative.      Except as noted above the remainder of the review of systems was reviewed and negative.       PAST MEDICAL HISTORY     Past Medical History:  dentistry.      CONSULTS:  None    PROCEDURES:  Unless otherwise noted below, none     Incision/Drainage    Date/Time: 8/10/2024 4:54 PM    Performed by: Ariel Van MD  Authorized by: Ariel Van MD    Consent:     Consent obtained:  Verbal    Consent given by:  Patient    Risks discussed:  Bleeding and infection    Alternatives discussed:  No treatment  Universal protocol:     Patient identity confirmed:  Verbally with patient and arm band  Location:     Type:  Abscess    Location:  Mouth    Mouth location:  Vestibule of mouth  Sedation:     Sedation type:  None  Anesthesia:     Anesthesia method:  None  Procedure type:     Complexity:  Simple  Procedure details:     Ultrasound guidance: no      Needle aspiration: yes      Needle size:  18 G    Incision types:  Stab incision    Incision depth:  Submucosal    Wound management:  Extensive cleaning    Drainage:  Purulent    Drainage amount:  Scant    Packing materials:  None  Post-procedure details:     Procedure completion:  Tolerated      FINAL IMPRESSION      1. Dental abscess          DISPOSITION/PLAN   DISPOSITION Decision To Discharge 08/10/2024 04:55:50 PM      PATIENT REFERRED TO:  No follow-up provider specified.    DISCHARGE MEDICATIONS:  New Prescriptions    No medications on file          (Please note that portions of this note were completed with a voice recognition program.  Efforts were made to edit the dictations but occasionally words are mis-transcribed.)    Ariel Van MD (electronically signed)  Attending Emergency Physician

## 2024-10-31 ENCOUNTER — PATIENT MESSAGE (OUTPATIENT)
Dept: BARIATRICS/WEIGHT MGMT | Age: 29
End: 2024-10-31

## 2025-03-07 ENCOUNTER — OFFICE VISIT (OUTPATIENT)
Dept: OBGYN CLINIC | Age: 30
End: 2025-03-07
Payer: COMMERCIAL

## 2025-03-07 VITALS
HEIGHT: 68 IN | HEART RATE: 68 BPM | DIASTOLIC BLOOD PRESSURE: 60 MMHG | BODY MASS INDEX: 28.79 KG/M2 | SYSTOLIC BLOOD PRESSURE: 104 MMHG | WEIGHT: 190 LBS

## 2025-03-07 DIAGNOSIS — Z01.419 WELL WOMAN EXAM WITH ROUTINE GYNECOLOGICAL EXAM: Primary | ICD-10-CM

## 2025-03-07 PROCEDURE — 99395 PREV VISIT EST AGE 18-39: CPT | Performed by: OBSTETRICS & GYNECOLOGY

## 2025-03-07 RX ORDER — METRONIDAZOLE 500 MG/1
500 TABLET ORAL 2 TIMES DAILY
Qty: 14 TABLET | Refills: 0 | Status: SHIPPED | OUTPATIENT
Start: 2025-03-07 | End: 2025-03-14

## 2025-03-07 SDOH — ECONOMIC STABILITY: FOOD INSECURITY: WITHIN THE PAST 12 MONTHS, YOU WORRIED THAT YOUR FOOD WOULD RUN OUT BEFORE YOU GOT MONEY TO BUY MORE.: NEVER TRUE

## 2025-03-07 SDOH — ECONOMIC STABILITY: FOOD INSECURITY: WITHIN THE PAST 12 MONTHS, THE FOOD YOU BOUGHT JUST DIDN'T LAST AND YOU DIDN'T HAVE MONEY TO GET MORE.: NEVER TRUE

## 2025-03-07 ASSESSMENT — PATIENT HEALTH QUESTIONNAIRE - PHQ9
8. MOVING OR SPEAKING SO SLOWLY THAT OTHER PEOPLE COULD HAVE NOTICED. OR THE OPPOSITE, BEING SO FIGETY OR RESTLESS THAT YOU HAVE BEEN MOVING AROUND A LOT MORE THAN USUAL: NOT AT ALL
SUM OF ALL RESPONSES TO PHQ QUESTIONS 1-9: 0
3. TROUBLE FALLING OR STAYING ASLEEP: NOT AT ALL
6. FEELING BAD ABOUT YOURSELF - OR THAT YOU ARE A FAILURE OR HAVE LET YOURSELF OR YOUR FAMILY DOWN: NOT AT ALL
5. POOR APPETITE OR OVEREATING: NOT AT ALL
SUM OF ALL RESPONSES TO PHQ QUESTIONS 1-9: 0
10. IF YOU CHECKED OFF ANY PROBLEMS, HOW DIFFICULT HAVE THESE PROBLEMS MADE IT FOR YOU TO DO YOUR WORK, TAKE CARE OF THINGS AT HOME, OR GET ALONG WITH OTHER PEOPLE: NOT DIFFICULT AT ALL
SUM OF ALL RESPONSES TO PHQ QUESTIONS 1-9: 0
9. THOUGHTS THAT YOU WOULD BE BETTER OFF DEAD, OR OF HURTING YOURSELF: NOT AT ALL
SUM OF ALL RESPONSES TO PHQ QUESTIONS 1-9: 0
2. FEELING DOWN, DEPRESSED OR HOPELESS: NOT AT ALL
1. LITTLE INTEREST OR PLEASURE IN DOING THINGS: NOT AT ALL
4. FEELING TIRED OR HAVING LITTLE ENERGY: NOT AT ALL
7. TROUBLE CONCENTRATING ON THINGS, SUCH AS READING THE NEWSPAPER OR WATCHING TELEVISION: NOT AT ALL

## 2025-03-07 NOTE — PROGRESS NOTES
Subjective:      Jumana Thayer is a 30 y.o.female  here for routine exam.  Current Complaints: normal menses, no abnormal bleeding, pelvic pain or discharge, no breast pain or new or enlarging lumps on self exam.    Menstrual history:   regular menses 30 days  Sexual activity:  no, denies knowledge of risky exposure  Abnormal vaginaldischarge:  Yes  Contraceptive method:  none  History of Present Illness  The patient is a 30-year-old female who presents for evaluation of vaginal discharge.    She reports overall good health, with regular menstrual cycles. She is not currently using any form of birth control. She is sexually active and has been experiencing mild vaginal discharge for the past few months. She reports no family history of breast cancer.    FAMILY HISTORY  She does not have a family history of breast cancer.     Vitals:  /60 (Site: Right Upper Arm, Position: Sitting, Cuff Size: Medium Adult)   Pulse 68   Ht 1.727 m (5' 8\")   Wt 86.2 kg (190 lb)   BMI 28.89 kg/m²   Allergies:  Nsaids and Tomato     Past Medical History:   Diagnosis Date    Depression 2021    Diet controlled gestational diabetes mellitus (GDM), antepartum 10/25/2017    History of 2019 novel coronavirus disease (COVID-19) 2021    DX 10/2021 @ [facility]    History of abnormal cervical Pap smear 2024    ASCUS with negative HPV 2023    Hyperemesis complicating pregnancy, antepartum 2017    Low HDL (under 40) 2021    Morbid obesity 2021    Status post gastric bypass for obesity 2023    GASTRIC BYPASS RONNA-EN-Y LAPAROSCOPIC/ROBOTIC PER DR WISE 23    Type 2 diabetes mellitus in pregnancy      Past Surgical History:   Procedure Laterality Date    RONNA-EN-Y GASTRIC BYPASS N/A 2023    GASTRIC BYPASS RONNA-EN-Y LAPAROSCOPIC/ROBOTIC performed by Ivan Wise MD at [facility] OR    TONSILLECTOMY  2015     Family History   Problem Relation Age of Onset    Hypertension Mother

## 2025-03-14 ENCOUNTER — OFFICE VISIT (OUTPATIENT)
Dept: BARIATRICS/WEIGHT MGMT | Age: 30
End: 2025-03-14

## 2025-03-14 VITALS — BODY MASS INDEX: 29.25 KG/M2 | HEIGHT: 68 IN | WEIGHT: 193 LBS

## 2025-03-14 VITALS
WEIGHT: 190 LBS | BODY MASS INDEX: 28.79 KG/M2 | HEIGHT: 68 IN | DIASTOLIC BLOOD PRESSURE: 78 MMHG | SYSTOLIC BLOOD PRESSURE: 128 MMHG | HEART RATE: 57 BPM | OXYGEN SATURATION: 100 %

## 2025-03-14 DIAGNOSIS — Z98.84 BARIATRIC SURGERY STATUS: Primary | ICD-10-CM

## 2025-03-14 DIAGNOSIS — Z71.3 DIETARY COUNSELING AND SURVEILLANCE: ICD-10-CM

## 2025-03-14 DIAGNOSIS — Z98.84 S/P GASTRIC BYPASS: Primary | ICD-10-CM

## 2025-03-14 DIAGNOSIS — Z98.84 BARIATRIC SURGERY STATUS: ICD-10-CM

## 2025-03-14 LAB
ALBUMIN SERPL-MCNC: 4.5 G/DL (ref 3.5–4.6)
ERYTHROCYTE [DISTWIDTH] IN BLOOD BY AUTOMATED COUNT: 11.9 % (ref 11.5–14.5)
HCT VFR BLD AUTO: 39.3 % (ref 37–47)
HGB BLD-MCNC: 12.9 G/DL (ref 12–16)
MCH RBC QN AUTO: 29.7 PG (ref 27–31.3)
MCHC RBC AUTO-ENTMCNC: 32.8 % (ref 33–37)
MCV RBC AUTO: 90.3 FL (ref 79.4–94.8)
PLATELET # BLD AUTO: 407 K/UL (ref 130–400)
PTH-INTACT SERPL-MCNC: 56.9 PG/ML (ref 15–65)
RBC # BLD AUTO: 4.35 M/UL (ref 4.2–5.4)
WBC # BLD AUTO: 7.8 K/UL (ref 4.8–10.8)

## 2025-03-14 NOTE — PROGRESS NOTES
Medical Nutrition Therapy  [facility]    Patient here for her 18 month post op Hector en Y gastric bypass.         3/14/2025  Height:   Ht Readings from Last 1 Encounters:   03/07/25 1.727 m (5' 8\")     Weight:   Wt Readings from Last 1 Encounters:   03/07/25 86.2 kg (190 lb)     BMI:   BMI Readings from Last 1 Encounters:   03/07/25 28.89 kg/m²       Pre-Surgery Weight: 266 lbs  TBW lost: 73 lbs ( gained 13 lbs over 1 year)  % EBW lost: 72 %    Patient has lost a total of 87 lbs from start of program.    Labs reviewed: N/A    Intolerances/Difficulties: c/o headaches, fatigue, patient just quit job that she had for 8 months, states has been skipping meals and drinking only 8 oz water/day.  Also not taking vitamins correctly. Reports no BM x 1 week    Vitamins:  Bariatric MVI with iron  and Calcium Citrate 1000-1500mg/day, however not getting enough iron from MVI    Protein goal: not met, < 30 grams/day, eating only once daily    Fluid goal: not met, 8 oz/day     Exercise: gym 2 days/week, cardio 60 minutes    Nutrition Diagnosis:  PES: Inadequate protein and fluid intake related to busy schedule d/t new job, skipping meals often as evidenced by report of < 30 grams protein, 8 oz fluid/day, c/o fatigue, dizziness..    Nutrition Intervention:  Education provided to include review of post surgical diet progression, meal timing, protein and fluid intake, vitamins, and exercise.     Nutrition Monitoring and evaluation:  Protein intake: 76-95 grams/day (1.2-1.5gr/kg IBW)  Fluid intake: 64-90oz sugar free, calorie free, caffeine free, non carbonated beverages  Vitamins: Bariatric MVI with iron,  Calcium citrate 1000-1500mg/day in divided doses  Exercise: 45-60 minutes of exercise 5 days/week, including 3 days of strength training    Plan/Recommendation:   Resume 1-2 protein shakes/day and 3 meals to help meet protein goal 90 grams/day  Sip liquids every 10-15 minutes, increase to goal 64

## 2025-03-15 LAB
ESTIMATED AVERAGE GLUCOSE: 97 MG/DL
HBA1C MFR BLD: 5 % (ref 4–6)
IRON % SATURATION: 12 % (ref 20–55)
IRON: 48 UG/DL (ref 37–145)
TOTAL IRON BINDING CAPACITY: 393 UG/DL (ref 250–450)
UNSATURATED IRON BINDING CAPACITY: 345 UG/DL (ref 112–347)
VITAMIN B-12: 1013 PG/ML (ref 232–1245)
VITAMIN D 25-HYDROXY: 26.3 NG/ML (ref 30–100)

## 2025-03-16 LAB
CA-I ADJ PH7.4 SERPL-SCNC: 1.25 MMOL/L (ref 1.09–1.3)
CA-I SERPL ISE-SCNC: 1.26 MMOL/L (ref 1.09–1.3)

## 2025-03-19 LAB — VIT B1 BLD-MCNC: 91 NMOL/L (ref 70–180)

## 2025-05-08 ENCOUNTER — OFFICE VISIT (OUTPATIENT)
Age: 30
End: 2025-05-08
Payer: COMMERCIAL

## 2025-05-08 VITALS
HEART RATE: 81 BPM | OXYGEN SATURATION: 98 % | DIASTOLIC BLOOD PRESSURE: 82 MMHG | BODY MASS INDEX: 28.52 KG/M2 | WEIGHT: 188.2 LBS | HEIGHT: 68 IN | SYSTOLIC BLOOD PRESSURE: 134 MMHG

## 2025-05-08 DIAGNOSIS — R19.7 DIARRHEA, UNSPECIFIED TYPE: Primary | ICD-10-CM

## 2025-05-08 PROCEDURE — 99214 OFFICE O/P EST MOD 30 MIN: CPT | Performed by: SURGERY

## 2025-05-08 RX ORDER — LACTOBACILLUS RHAMNOSUS GG 10B CELL
1 CAPSULE ORAL 2 TIMES DAILY
Qty: 180 TABLET | Refills: 3 | Status: SHIPPED | OUTPATIENT
Start: 2025-05-08

## 2025-05-08 RX ORDER — CHOLESTYRAMINE 4 G/9G
1 POWDER, FOR SUSPENSION ORAL 2 TIMES DAILY
Qty: 90 PACKET | Refills: 3 | Status: SHIPPED | OUTPATIENT
Start: 2025-05-08

## 2025-05-08 NOTE — PATIENT INSTRUCTIONS
Follow up in 1 months for your 9 month post op visit.    Take questran     Obtain labs before follow up appointment in 6 months.    THE BIG FOUR GUIDELINES:    1.  Count calories!  People who count calories eat less.  Use the daily plate or other apps for your smart phone or computer.  The more you count the more of an expert you will become and will get easier and easier.  If you are trying to lose weight and exercising a lot, keep calories to around the thousand to 1200 a day.  If you are not exercising consistently try to limit them to around 800 a day.    2.  Separate liquids and solids.  Wait 30 minutes to an hour after you eat before drinking liquids.  This will reduce the total amount of food you eat in the meal.    3.  Exercise!  Optimally,  up to 5 hours a week with a combination of cardio and resistance or weight training will dramatically help to keep excess body fat off.  But any bit of exercise makes a big, long-term difference.    4.  Sleep!   Try to get 7 or more hours of sleep a night.  If you have obstructive sleep apnea definitely use your CPAP machine.    THE RULE OF 20'S:  For every meal, chew each bite 20 seconds.  Then put the fork down and wait 20 seconds after you swallow before picking up the fork again.  Each meal should take at least 20 minutes so your brain can register that you are full.

## 2025-05-08 NOTE — PROGRESS NOTES
Post Op Clinic Visit      Jumana Thayer  is 23 months  post-op from Robotic Hector en Y gastric bypass.   Abdominal pain has been absent, but she returns today c/o post-prandial diarrhea x 2 weeks.  She did have an anti-fungal for a yeast infection and what sounds like rifampin (\"gave me orange pee\") for a UTI a few weeks ago.  She denies fevers or any other diarrhea than after eating.  She has been eating sausage.      Pre-Surgery Weight: 266 lbs  TBW lost: 78 lbs (she has gained 8 pounds in 3 months)  % EBW lost: 81 %     Patient has lost a total of 92 lbs from start of program.    Physical Exam:    /82   Pulse 81   Ht 1.727 m (5' 8\")   Wt 85.4 kg (188 lb 3.2 oz)   SpO2 98%   BMI 28.62 kg/m²     General: No acute distress  HEENT: P PERRLA, no scleral icterus.  Trachea midline.  Thoracic: Clear to auscultation bilaterally.  Cardiac: Regular rate and rhythm with no rubs clicks or murmurs.  Abdomen: The incisions are healing well.There are no hernias    Extremities: No clubbing cyanosis or edema.  Neurologic: No gross motor or sensory defects.        Assessment and Plan:      Jumana Thayer  is 23 months  post-op from Robotic Hector en Y gastric bypass.   Abdominal pain has been absent, but she returns today c/o post-prandial diarrhea x 2 weeks.  She did have an anti-fungal for a yeast infection and what sounds like rifampin (\"gave me orange pee\") for a UTI a few weeks ago.  She denies fevers or any other diarrhea than after eating.  She has been eating sausage.      We will try oral Questran as well as probiotics for a few weeks.  I do not believe she has a active C. difficile infection as her diarrhea is only postprandial, there is no abdominal pain, and no fevers.    We also discussed that her diarrhea could be because of consuming higher fat foods.  She has gained 8 pounds in 3 months.  Some of this may be scale discrepancy as well as clothing discrepancy.    We discussed that if after a few weeks of

## 2025-05-30 ENCOUNTER — TELEPHONE (OUTPATIENT)
Dept: OBGYN CLINIC | Age: 30
End: 2025-05-30

## 2025-05-30 NOTE — TELEPHONE ENCOUNTER
Pt calling in with c/o yeast infection. Pt asking if diflucan be sent to pharmacy. Pt aware  Is gone for the weekend.

## 2025-06-02 RX ORDER — FLUCONAZOLE 150 MG/1
TABLET ORAL
Qty: 3 TABLET | Refills: 0 | Status: SHIPPED | OUTPATIENT
Start: 2025-06-02

## (undated) DEVICE — SINGLE PORT MANIFOLD: Brand: NEPTUNE 2

## (undated) DEVICE — TUBING INSUFFLATION SMK EVAC HI FLO SET PNEUMOCLEAR

## (undated) DEVICE — TOWEL,OR,DSP,ST,BLUE,STD,4/PK,20PK/CS: Brand: MEDLINE

## (undated) DEVICE — GAUZE,SPONGE,4"X4",16PLY,XRAY,STRL,LF: Brand: MEDLINE

## (undated) DEVICE — RESERVOIR,SUCTION,100CC,SILICONE: Brand: MEDLINE

## (undated) DEVICE — GOWN,AURORA,NONREINFORCED,LARGE: Brand: MEDLINE

## (undated) DEVICE — DRAPE SURG UTIL 26X15 IN W/ TAPE N INVASIVE MULT LAYR DISP

## (undated) DEVICE — SPONGE,LAP,18"X18",DLX,XR,ST,5/PK,40/PK: Brand: MEDLINE

## (undated) DEVICE — STAPLER SKIN H3.9MM WIRE DIA0.58MM CRWN 6.9MM 35 STPL FIX

## (undated) DEVICE — GLOVE ORANGE PI 8 1/2   MSG9085

## (undated) DEVICE — SYRINGE MED 30ML STD CLR PLAS LUERLOCK TIP N CTRL DISP

## (undated) DEVICE — SUTURE ABSORBABLE MONOFILAMENT 2-0 SH 6 IN STRATAFIX SPRL SXPP1B415

## (undated) DEVICE — VESSEL SEALER EXTEND: Brand: ENDOWRIST

## (undated) DEVICE — SUTURE SZ 0 27IN 5/8 CIR UR-6  TAPER PT VIOLET ABSRB VICRYL J603H

## (undated) DEVICE — BANDAGE ADH W2XL4IN NITRL FAB STRP CURAD

## (undated) DEVICE — BLADE,CARBON-STEEL,11,STRL,DISPOSABLE,TB: Brand: MEDLINE

## (undated) DEVICE — TTL1LYR 16FR10ML 100%SIL TMPST TR: Brand: MEDLINE

## (undated) DEVICE — CANNULA SEAL

## (undated) DEVICE — STAPLER 60 RELOAD BLUE: Brand: SUREFORM

## (undated) DEVICE — DRAPE,LAP,CHOLE,W/TROUGHS,STERILE: Brand: MEDLINE

## (undated) DEVICE — SUTURE NONABSORBABLE MONOFILAMENT 3-0 PS-1 18 IN BLK ETHILON 1663H

## (undated) DEVICE — STAPLER 60: Brand: SUREFORM

## (undated) DEVICE — STAPLER 60 RELOAD WHITE: Brand: SUREFORM

## (undated) DEVICE — VISIGI 3D®  CALIBRATION SYSTEM  SIZE 40FR STD W/ BULB: Brand: BOEHRINGER® VISIGI 3D™ SLEEVE GASTRECTOMY CALIBRATION SYSTEM, SIZE 40FR W/BULB

## (undated) DEVICE — TIP COVER ACCESSORY

## (undated) DEVICE — MARKER SURG SKIN GENTIAN VLT REG TIP W/ 6IN RUL

## (undated) DEVICE — BLADELESS OBTURATOR: Brand: WECK VISTA

## (undated) DEVICE — PACK,BASIC: Brand: MEDLINE

## (undated) DEVICE — APPLICATOR MEDICATED 26 CC SOLUTION HI LT ORNG CHLORAPREP

## (undated) DEVICE — LABEL MED MINI W/ MARKER

## (undated) DEVICE — DRAIN SURG 19FR 0.25IN SIL RND W/ TRCR INDIC DOT RADPQ FULL

## (undated) DEVICE — ELECTRO LUBE IS A SINGLE PATIENT USE DEVICE THAT IS INTENDED TO BE USED ON ELECTROSURGICAL ELECTRODES TO REDUCE STICKING.: Brand: KEY SURGICAL ELECTRO LUBE

## (undated) DEVICE — ARM DRAPE

## (undated) DEVICE — SUCTION IRRIGATOR: Brand: ENDOWRIST

## (undated) DEVICE — WARMER SCP 2 ANTIFOG LAP DISP

## (undated) DEVICE — KIT,ANTI FOG,W/SPONGE & FLUID,SOFT PACK: Brand: MEDLINE

## (undated) DEVICE — SUTURE VCRL SZ 2-0 L27IN ABSRB VLT L26MM SH 1/2 CIR J317H

## (undated) DEVICE — REDUCER: Brand: ENDOWRIST

## (undated) DEVICE — SOLUTION ANTIFOG VIS SYS CLEARIFY LAPSCP

## (undated) DEVICE — SEAL

## (undated) DEVICE — COLUMN DRAPE

## (undated) DEVICE — COUNTER NDL 40 COUNT HLD 70 FOAM BLK ADH W/ MAG